# Patient Record
Sex: FEMALE | Race: BLACK OR AFRICAN AMERICAN | Employment: OTHER | URBAN - METROPOLITAN AREA
[De-identification: names, ages, dates, MRNs, and addresses within clinical notes are randomized per-mention and may not be internally consistent; named-entity substitution may affect disease eponyms.]

---

## 2017-03-02 ENCOUNTER — HOSPITAL ENCOUNTER (OUTPATIENT)
Dept: MAMMOGRAPHY | Age: 72
Discharge: HOME | End: 2017-03-02
Attending: FAMILY MEDICINE
Payer: MEDICARE

## 2017-03-02 DIAGNOSIS — Z12.31 VISIT FOR SCREENING MAMMOGRAM: ICD-10-CM

## 2017-03-02 PROCEDURE — 77067 SCR MAMMO BI INCL CAD: CPT

## 2017-07-13 PROBLEM — M51.36 DDD (DEGENERATIVE DISC DISEASE), LUMBAR: Status: ACTIVE | Noted: 2017-07-13

## 2017-07-24 ENCOUNTER — HOSPITAL ENCOUNTER (OUTPATIENT)
Dept: ULTRASOUND IMAGING | Age: 72
Discharge: HOME | End: 2017-07-24
Attending: FAMILY MEDICINE
Payer: MEDICARE

## 2017-07-24 DIAGNOSIS — N18.30 CHRONIC KIDNEY DISEASE, STAGE III (MODERATE) (HCC): ICD-10-CM

## 2017-07-24 PROCEDURE — 76775 US EXAM ABDO BACK WALL LIM: CPT

## 2018-03-20 ENCOUNTER — HOSPITAL ENCOUNTER (OUTPATIENT)
Dept: MAMMOGRAPHY | Age: 73
Discharge: HOME | End: 2018-03-20
Attending: FAMILY MEDICINE
Payer: MEDICARE

## 2018-03-20 DIAGNOSIS — Z12.39 SCREENING BREAST EXAMINATION: ICD-10-CM

## 2018-03-20 PROCEDURE — 77067 SCR MAMMO BI INCL CAD: CPT

## 2018-08-29 PROBLEM — E66.01 OBESITY, MORBID (HCC): Status: ACTIVE | Noted: 2018-08-29

## 2019-03-21 ENCOUNTER — HOSPITAL ENCOUNTER (OUTPATIENT)
Dept: MAMMOGRAPHY | Age: 74
Discharge: HOME | End: 2019-03-21
Attending: FAMILY MEDICINE
Payer: MEDICARE

## 2019-03-21 DIAGNOSIS — Z12.31 VISIT FOR SCREENING MAMMOGRAM: ICD-10-CM

## 2019-03-21 PROCEDURE — 77067 SCR MAMMO BI INCL CAD: CPT

## 2020-08-31 ENCOUNTER — HOSPITAL ENCOUNTER (OUTPATIENT)
Dept: MAMMOGRAPHY | Age: 75
Discharge: HOME | End: 2020-08-31
Attending: FAMILY MEDICINE
Payer: MEDICARE

## 2020-08-31 DIAGNOSIS — Z12.31 SCREENING MAMMOGRAM, ENCOUNTER FOR: ICD-10-CM

## 2020-08-31 DIAGNOSIS — M19.90 ARTHRITIS: ICD-10-CM

## 2020-08-31 DIAGNOSIS — M51.36 DDD (DEGENERATIVE DISC DISEASE), LUMBAR: ICD-10-CM

## 2020-08-31 PROCEDURE — 77067 SCR MAMMO BI INCL CAD: CPT

## 2020-08-31 PROCEDURE — 77080 DXA BONE DENSITY AXIAL: CPT

## 2021-04-28 PROBLEM — J30.1 SEASONAL ALLERGIC RHINITIS DUE TO POLLEN: Status: ACTIVE | Noted: 2021-04-28

## 2021-08-06 ENCOUNTER — TRANSCRIBE ORDER (OUTPATIENT)
Dept: SCHEDULING | Age: 76
End: 2021-08-06

## 2021-08-06 DIAGNOSIS — Z12.31 VISIT FOR SCREENING MAMMOGRAM: Primary | ICD-10-CM

## 2021-09-18 ENCOUNTER — HOSPITAL ENCOUNTER (OUTPATIENT)
Dept: MAMMOGRAPHY | Age: 76
Discharge: HOME | End: 2021-09-18
Attending: FAMILY MEDICINE
Payer: MEDICARE

## 2021-09-18 DIAGNOSIS — Z12.31 VISIT FOR SCREENING MAMMOGRAM: ICD-10-CM

## 2021-09-18 PROCEDURE — 77067 SCR MAMMO BI INCL CAD: CPT

## 2021-10-30 PROBLEM — H10.13 ALLERGIC CONJUNCTIVITIS OF BOTH EYES: Status: ACTIVE | Noted: 2021-10-30

## 2022-02-02 PROCEDURE — 88305 TISSUE EXAM BY PATHOLOGIST: CPT

## 2022-02-03 ENCOUNTER — HOSPITAL ENCOUNTER (OUTPATIENT)
Dept: LAB | Age: 77
Discharge: HOME | End: 2022-02-03

## 2022-03-14 ENCOUNTER — HOSPITAL ENCOUNTER (OUTPATIENT)
Dept: CT IMAGING | Age: 77
Discharge: HOME | End: 2022-03-14
Attending: NURSE PRACTITIONER
Payer: MEDICARE

## 2022-03-14 DIAGNOSIS — R10.9 SUDDEN ONSET OF SEVERE ABDOMINAL PAIN: ICD-10-CM

## 2022-03-14 DIAGNOSIS — N18.4 CHRONIC KIDNEY DISEASE, STAGE IV (SEVERE) (HCC): ICD-10-CM

## 2022-03-14 LAB — CREAT BLD-MCNC: 3.91 MG/DL (ref 0.8–1.5)

## 2022-03-14 PROCEDURE — 74011000636 HC RX REV CODE- 636: Performed by: NURSE PRACTITIONER

## 2022-03-14 PROCEDURE — 82565 ASSAY OF CREATININE: CPT

## 2022-03-14 PROCEDURE — 74176 CT ABD & PELVIS W/O CONTRAST: CPT

## 2022-03-14 RX ADMIN — DIATRIZOATE MEGLUMINE AND DIATRIZOATE SODIUM 15 ML: 660; 100 LIQUID ORAL; RECTAL at 13:41

## 2022-03-14 NOTE — PROGRESS NOTES
Per stat call report no evidence of abscess - there is diverticulitis - take antibiotic and f/up next week or as needed per other labs.  Also, she needs to f/up with nephrology asap

## 2022-03-18 PROBLEM — M51.36 DDD (DEGENERATIVE DISC DISEASE), LUMBAR: Status: ACTIVE | Noted: 2017-07-13

## 2022-03-18 PROBLEM — M51.369 DDD (DEGENERATIVE DISC DISEASE), LUMBAR: Status: ACTIVE | Noted: 2017-07-13

## 2022-03-19 PROBLEM — J30.1 ALLERGIC RHINITIS DUE TO POLLEN: Status: ACTIVE | Noted: 2021-04-28

## 2022-03-19 PROBLEM — H10.13 ALLERGIC CONJUNCTIVITIS OF BOTH EYES: Status: ACTIVE | Noted: 2021-10-30

## 2022-03-19 PROBLEM — E66.01 OBESITY, MORBID (HCC): Status: ACTIVE | Noted: 2018-08-29

## 2022-05-20 PROBLEM — N18.4 STAGE 4 CHRONIC KIDNEY DISEASE (HCC): Status: ACTIVE | Noted: 2022-04-05

## 2022-05-20 PROBLEM — R73.01 IFG (IMPAIRED FASTING GLUCOSE): Status: ACTIVE | Noted: 2022-05-20

## 2022-05-24 ENCOUNTER — TELEPHONE (OUTPATIENT)
Dept: INTERNAL MEDICINE CLINIC | Facility: CLINIC | Age: 77
End: 2022-05-24

## 2022-05-24 NOTE — TELEPHONE ENCOUNTER
-We discussed this, she didn't have a sinus infection, she had allergies and I refilled her Xyzal and she was doing flonase. She didn't have any purulent congestion at that time.

## 2022-06-13 DIAGNOSIS — N18.5 STAGE 5 CHRONIC KIDNEY DISEASE NOT ON CHRONIC DIALYSIS (HCC): Primary | ICD-10-CM

## 2022-09-20 ENCOUNTER — HOSPITAL ENCOUNTER (OUTPATIENT)
Dept: MAMMOGRAPHY | Age: 77
Discharge: HOME | End: 2022-09-23
Payer: MEDICARE

## 2022-09-20 DIAGNOSIS — Z12.31 VISIT FOR SCREENING MAMMOGRAM: ICD-10-CM

## 2022-09-20 PROCEDURE — 77067 SCR MAMMO BI INCL CAD: CPT

## 2022-10-13 RX ORDER — FUROSEMIDE 20 MG/1
TABLET ORAL
Qty: 90 TABLET | Refills: 1 | Status: SHIPPED | OUTPATIENT
Start: 2022-10-13

## 2022-11-18 ENCOUNTER — OFFICE VISIT (OUTPATIENT)
Dept: INTERNAL MEDICINE CLINIC | Facility: CLINIC | Age: 77
End: 2022-11-18
Payer: MEDICARE

## 2022-11-18 VITALS
SYSTOLIC BLOOD PRESSURE: 142 MMHG | HEART RATE: 90 BPM | RESPIRATION RATE: 16 BRPM | HEIGHT: 65 IN | DIASTOLIC BLOOD PRESSURE: 80 MMHG | WEIGHT: 269 LBS | OXYGEN SATURATION: 96 % | BODY MASS INDEX: 44.82 KG/M2

## 2022-11-18 DIAGNOSIS — N18.4 STAGE 4 CHRONIC KIDNEY DISEASE (HCC): Primary | ICD-10-CM

## 2022-11-18 DIAGNOSIS — M17.0 ARTHRITIS OF BOTH KNEES: ICD-10-CM

## 2022-11-18 DIAGNOSIS — I12.9 BENIGN HYPERTENSION WITH CHRONIC KIDNEY DISEASE: ICD-10-CM

## 2022-11-18 DIAGNOSIS — K58.0 IRRITABLE BOWEL SYNDROME WITH DIARRHEA: ICD-10-CM

## 2022-11-18 PROCEDURE — 4004F PT TOBACCO SCREEN RCVD TLK: CPT | Performed by: FAMILY MEDICINE

## 2022-11-18 PROCEDURE — 1123F ACP DISCUSS/DSCN MKR DOCD: CPT | Performed by: FAMILY MEDICINE

## 2022-11-18 PROCEDURE — 1090F PRES/ABSN URINE INCON ASSESS: CPT | Performed by: FAMILY MEDICINE

## 2022-11-18 PROCEDURE — 99214 OFFICE O/P EST MOD 30 MIN: CPT | Performed by: FAMILY MEDICINE

## 2022-11-18 PROCEDURE — G8399 PT W/DXA RESULTS DOCUMENT: HCPCS | Performed by: FAMILY MEDICINE

## 2022-11-18 PROCEDURE — G8417 CALC BMI ABV UP PARAM F/U: HCPCS | Performed by: FAMILY MEDICINE

## 2022-11-18 PROCEDURE — 3017F COLORECTAL CA SCREEN DOC REV: CPT | Performed by: FAMILY MEDICINE

## 2022-11-18 PROCEDURE — G8428 CUR MEDS NOT DOCUMENT: HCPCS | Performed by: FAMILY MEDICINE

## 2022-11-18 PROCEDURE — G8484 FLU IMMUNIZE NO ADMIN: HCPCS | Performed by: FAMILY MEDICINE

## 2022-11-18 RX ORDER — LEVOCETIRIZINE DIHYDROCHLORIDE 5 MG/1
5 TABLET, FILM COATED ORAL DAILY
Qty: 90 TABLET | Refills: 1 | Status: SHIPPED | OUTPATIENT
Start: 2022-11-18

## 2022-11-18 RX ORDER — AMLODIPINE BESYLATE 10 MG/1
10 TABLET ORAL DAILY
Qty: 90 TABLET | Refills: 1 | Status: SHIPPED | OUTPATIENT
Start: 2022-11-18

## 2022-11-18 RX ORDER — MONTELUKAST SODIUM 4 MG/1
1 TABLET, CHEWABLE ORAL 2 TIMES DAILY
Qty: 180 TABLET | Refills: 1 | Status: SHIPPED | OUTPATIENT
Start: 2022-11-18

## 2022-11-18 RX ORDER — LISINOPRIL 40 MG/1
40 TABLET ORAL DAILY
Qty: 90 TABLET | Refills: 1 | Status: SHIPPED | OUTPATIENT
Start: 2022-11-18

## 2022-11-18 RX ORDER — LISINOPRIL 40 MG/1
40 TABLET ORAL DAILY
COMMUNITY
End: 2022-11-18 | Stop reason: SDUPTHER

## 2022-11-18 RX ORDER — SODIUM BICARBONATE 650 MG/1
650 TABLET ORAL 2 TIMES DAILY
Qty: 180 TABLET | Refills: 1 | Status: SHIPPED | OUTPATIENT
Start: 2022-11-18

## 2022-11-18 ASSESSMENT — PATIENT HEALTH QUESTIONNAIRE - PHQ9
SUM OF ALL RESPONSES TO PHQ QUESTIONS 1-9: 0
2. FEELING DOWN, DEPRESSED OR HOPELESS: 0
SUM OF ALL RESPONSES TO PHQ9 QUESTIONS 1 & 2: 0
1. LITTLE INTEREST OR PLEASURE IN DOING THINGS: 0

## 2022-11-18 NOTE — PROGRESS NOTES
Afsaneh Myers DO  Diplomate of the [employer] of [address] [place] [address] Internal Medicine      Jignesh Chawla (: 1945) is a 78 y.o. female, here for evaluation of the following chief complaint(s):  Hypertension (/)       ASSESSMENT/PLAN:  1. Stage 4 chronic kidney disease ([address]  -     sodium bicarbonate 650 MG tablet; Take 1 tablet by mouth 2 times daily, Disp-180 tablet, R-1Normal  2. Benign hypertension with chronic kidney disease  -     amLODIPine (NORVASC) 10 MG tablet; Take 1 tablet by mouth daily, Disp-90 tablet, R-1Normal  -     lisinopril (PRINIVIL;ZESTRIL) 40 MG tablet; Take 1 tablet by mouth daily, Disp-90 tablet, R-1Normal  3. Irritable bowel syndrome with diarrhea  -     colestipol (COLESTID) 1 g tablet; Take 1 tablet by mouth 2 times daily, Disp-180 tablet, R-1Normal  4. Arthritis of both knees     -CKD managed by Nephrology. Stressed importance of good hydration. Continue with sodium bicarb.  -Tolerating medication well for HTN. Achieving desired therapeutic response with   Key Anti-Hypertensive Meds            amLODIPine (NORVASC) 10 MG tablet (Taking)    Sig - Route: Take 1 tablet by mouth daily - Oral    lisinopril (PRINIVIL;ZESTRIL) 40 MG tablet (Taking)    Sig - Route: Take 1 tablet by mouth daily - Oral    furosemide (LASIX) 20 MG tablet (Taking)    Sig: TAKE 1 TABLET BY MOUTH DAILY         --will continue. Will periodically review and adjust if needed. Encourage home monitoring. BP initially elevated, but on recheck looks ok. -Will continue with colestid for chronic diarrhea.  -Suggested cane as pt gait very unsteady due to knees. Follow up with Ortho as scheduled. SUBJECTIVE/OBJECTIVE:  HPI:  -CKD: Has had good urine output. Urine not overly concentrated. No dysuria/hematuria. Trying to maintain good fluid intake. -HTN: Home BP Monitoring: no. taking medications as instructed, no medication side effects noted.   She denies chest pain, palpitations, exertional pain or pressure. -IBS symptoms have been controlled with colestid.  -Having worsening pain in both of her knees--looking into R knee replacement as pain significantly worse. Takes tylenol with minimal relief. ROS negative except as noted above today. Social History     Tobacco Use    Smoking status: Every Day     Packs/day: 0.25     Types: Cigarettes     Last attempt to quit: 6/3/2016     Years since quittin.4    Smokeless tobacco: Never   Substance Use Topics    Alcohol use: Yes     Alcohol/week: 0.0 standard drinks    Drug use: No     Vitals:    22 1536 22 1559   BP: (!) 156/78 (!) 142/80   Site: Left Upper Arm    Position: Sitting    Pulse: 90    Resp: 16    SpO2: 96%    Weight: 269 lb (122 kg)    Height: 5' 5\" (1.651 m)       Body mass index is 44.76 kg/m². Physical Exam  Vitals reviewed. Cardiovascular:      Rate and Rhythm: Normal rate and regular rhythm. Pulmonary:      Effort: Pulmonary effort is normal.      Breath sounds: Normal breath sounds. Musculoskeletal:      Comments: -+antalgic gait due to knee pain. Skin:     General: Skin is warm and dry. Neurological:      Mental Status: She is alert. An electronic signature was used to authenticate this note.   Jackelin Langley DO

## 2023-01-17 ENCOUNTER — OFFICE VISIT (OUTPATIENT)
Dept: INTERNAL MEDICINE CLINIC | Facility: CLINIC | Age: 78
End: 2023-01-17
Payer: MEDICARE

## 2023-01-17 VITALS
SYSTOLIC BLOOD PRESSURE: 130 MMHG | BODY MASS INDEX: 43.65 KG/M2 | OXYGEN SATURATION: 97 % | HEART RATE: 83 BPM | DIASTOLIC BLOOD PRESSURE: 78 MMHG | HEIGHT: 65 IN | WEIGHT: 262 LBS

## 2023-01-17 DIAGNOSIS — J01.20 ACUTE ETHMOIDAL SINUSITIS, RECURRENCE NOT SPECIFIED: Primary | ICD-10-CM

## 2023-01-17 DIAGNOSIS — N18.5 CHRONIC KIDNEY DISEASE, STAGE 5 (HCC): ICD-10-CM

## 2023-01-17 DIAGNOSIS — N18.5 CHRONIC KIDNEY DISEASE (CKD), STAGE V (HCC): ICD-10-CM

## 2023-01-17 PROCEDURE — 99213 OFFICE O/P EST LOW 20 MIN: CPT | Performed by: NURSE PRACTITIONER

## 2023-01-17 PROCEDURE — G8484 FLU IMMUNIZE NO ADMIN: HCPCS | Performed by: NURSE PRACTITIONER

## 2023-01-17 PROCEDURE — G8417 CALC BMI ABV UP PARAM F/U: HCPCS | Performed by: NURSE PRACTITIONER

## 2023-01-17 PROCEDURE — 4004F PT TOBACCO SCREEN RCVD TLK: CPT | Performed by: NURSE PRACTITIONER

## 2023-01-17 PROCEDURE — G8427 DOCREV CUR MEDS BY ELIG CLIN: HCPCS | Performed by: NURSE PRACTITIONER

## 2023-01-17 PROCEDURE — G8399 PT W/DXA RESULTS DOCUMENT: HCPCS | Performed by: NURSE PRACTITIONER

## 2023-01-17 PROCEDURE — 1123F ACP DISCUSS/DSCN MKR DOCD: CPT | Performed by: NURSE PRACTITIONER

## 2023-01-17 PROCEDURE — 3017F COLORECTAL CA SCREEN DOC REV: CPT | Performed by: NURSE PRACTITIONER

## 2023-01-17 PROCEDURE — 1090F PRES/ABSN URINE INCON ASSESS: CPT | Performed by: NURSE PRACTITIONER

## 2023-01-17 RX ORDER — AMOXICILLIN AND CLAVULANATE POTASSIUM 500; 125 MG/1; MG/1
1 TABLET, FILM COATED ORAL 2 TIMES DAILY
Qty: 14 TABLET | Refills: 0 | Status: SHIPPED | OUTPATIENT
Start: 2023-01-17 | End: 2023-01-24

## 2023-01-17 SDOH — ECONOMIC STABILITY: FOOD INSECURITY: WITHIN THE PAST 12 MONTHS, THE FOOD YOU BOUGHT JUST DIDN'T LAST AND YOU DIDN'T HAVE MONEY TO GET MORE.: NEVER TRUE

## 2023-01-17 SDOH — ECONOMIC STABILITY: FOOD INSECURITY: WITHIN THE PAST 12 MONTHS, YOU WORRIED THAT YOUR FOOD WOULD RUN OUT BEFORE YOU GOT MONEY TO BUY MORE.: NEVER TRUE

## 2023-01-17 ASSESSMENT — ENCOUNTER SYMPTOMS
SORE THROAT: 1
SINUS PRESSURE: 1
SHORTNESS OF BREATH: 0
COUGH: 1

## 2023-01-17 ASSESSMENT — PATIENT HEALTH QUESTIONNAIRE - PHQ9
SUM OF ALL RESPONSES TO PHQ QUESTIONS 1-9: 0
2. FEELING DOWN, DEPRESSED OR HOPELESS: 0
SUM OF ALL RESPONSES TO PHQ QUESTIONS 1-9: 0
SUM OF ALL RESPONSES TO PHQ9 QUESTIONS 1 & 2: 0
1. LITTLE INTEREST OR PLEASURE IN DOING THINGS: 0
SUM OF ALL RESPONSES TO PHQ QUESTIONS 1-9: 0
SUM OF ALL RESPONSES TO PHQ QUESTIONS 1-9: 0

## 2023-01-17 ASSESSMENT — SOCIAL DETERMINANTS OF HEALTH (SDOH): HOW HARD IS IT FOR YOU TO PAY FOR THE VERY BASICS LIKE FOOD, HOUSING, MEDICAL CARE, AND HEATING?: NOT VERY HARD

## 2023-01-17 NOTE — PROGRESS NOTES
Lord Rocha (:  1945) is a 78 y.o. female,Established patient, here for evaluation of the following chief complaint(s):  Sinusitis, Cough (Negative home covid test/), Pharyngitis, and Otalgia         ASSESSMENT/PLAN:  1. Acute ethmoidal sinusitis, recurrence not specified  2. Chronic kidney disease, stage 5 ([facility]  3. Chronic kidney disease (CKD), stage V ([facility] [facility] [[id number]]    No follow-ups on file. Sinusitis  Start abx, renally dosed based on last GFR documented  Discussed med risks and side effects  Continue flonase     Subjective   SUBJECTIVE/OBJECTIVE:  Patient is here for sinus congestion and sore throat. She has a lot of post-nasal drip. It started Tuesday of last week. She has been on daryl-seltzer plus. She is using vicks for her throat. She has been \"coughing her head off\". Sinusitis  This is a new problem. The current episode started in the past 7 days. There has been no fever. Associated symptoms include chills, congestion, coughing, ear pain, headaches, sinus pressure and a sore throat. Pertinent negatives include no shortness of breath. Cough  This is a new problem. The current episode started in the past 7 days. Associated symptoms include chills, ear pain, headaches and a sore throat. Pertinent negatives include no shortness of breath. Pharyngitis  Associated symptoms include chills, congestion, coughing, headaches and a sore throat. Otalgia   Associated symptoms include coughing, headaches and a sore throat. Review of Systems   Constitutional:  Positive for chills. HENT:  Positive for congestion, ear pain, sinus pressure and sore throat. Respiratory:  Positive for cough. Negative for shortness of breath. Neurological:  Positive for headaches. Objective   Physical Exam  Vitals reviewed. Constitutional:       Appearance: Normal appearance. She is ill-appearing. HENT:      Head: Normocephalic. Nose: Congestion present.       Mouth/Throat:      Mouth: Mucous membranes are moist.   Eyes:      Pupils: Pupils are equal, round, and reactive to light. Cardiovascular:      Rate and Rhythm: Normal rate and regular rhythm. Pulmonary:      Effort: Pulmonary effort is normal.      Breath sounds: Normal breath sounds. No wheezing. Musculoskeletal:      Cervical back: Neck supple. Neurological:      General: No focal deficit present. Mental Status: She is alert and oriented to person, place, and time. Psychiatric:         Mood and Affect: Mood normal.                An electronic signature was used to authenticate this note.     --RITU Cage - CNP

## 2023-05-19 ENCOUNTER — OFFICE VISIT (OUTPATIENT)
Dept: INTERNAL MEDICINE CLINIC | Facility: CLINIC | Age: 78
End: 2023-05-19

## 2023-05-19 VITALS
BODY MASS INDEX: 44.15 KG/M2 | WEIGHT: 265 LBS | RESPIRATION RATE: 16 BRPM | OXYGEN SATURATION: 98 % | HEIGHT: 65 IN | DIASTOLIC BLOOD PRESSURE: 70 MMHG | SYSTOLIC BLOOD PRESSURE: 130 MMHG | HEART RATE: 88 BPM

## 2023-05-19 DIAGNOSIS — E66.01 OBESITY, CLASS III, BMI 40-49.9 (MORBID OBESITY) (HCC): ICD-10-CM

## 2023-05-19 DIAGNOSIS — I89.0 CHRONIC ACQUIRED LYMPHEDEMA: Primary | ICD-10-CM

## 2023-05-19 DIAGNOSIS — Z00.00 MEDICARE ANNUAL WELLNESS VISIT, SUBSEQUENT: ICD-10-CM

## 2023-05-19 DIAGNOSIS — N18.4 STAGE 4 CHRONIC KIDNEY DISEASE (HCC): ICD-10-CM

## 2023-05-19 DIAGNOSIS — I12.9 BENIGN HYPERTENSION WITH CHRONIC KIDNEY DISEASE: ICD-10-CM

## 2023-05-19 DIAGNOSIS — K58.0 IRRITABLE BOWEL SYNDROME WITH DIARRHEA: ICD-10-CM

## 2023-05-19 RX ORDER — LISINOPRIL 40 MG/1
40 TABLET ORAL DAILY
Qty: 90 TABLET | Refills: 1 | Status: SHIPPED | OUTPATIENT
Start: 2023-05-19

## 2023-05-19 RX ORDER — AMLODIPINE BESYLATE 10 MG/1
10 TABLET ORAL DAILY
Qty: 90 TABLET | Refills: 1 | Status: SHIPPED | OUTPATIENT
Start: 2023-05-19

## 2023-05-19 RX ORDER — FUROSEMIDE 20 MG/1
20 TABLET ORAL DAILY
Qty: 90 TABLET | Refills: 1 | Status: CANCELLED | OUTPATIENT
Start: 2023-05-19

## 2023-05-19 RX ORDER — MONTELUKAST SODIUM 4 MG/1
1 TABLET, CHEWABLE ORAL 2 TIMES DAILY
Qty: 180 TABLET | Refills: 1 | Status: SHIPPED | OUTPATIENT
Start: 2023-05-19

## 2023-05-19 RX ORDER — LEVOCETIRIZINE DIHYDROCHLORIDE 5 MG/1
5 TABLET, FILM COATED ORAL DAILY
Qty: 90 TABLET | Refills: 1 | Status: SHIPPED | OUTPATIENT
Start: 2023-05-19

## 2023-05-19 RX ORDER — SODIUM BICARBONATE 650 MG/1
650 TABLET ORAL 2 TIMES DAILY
Qty: 180 TABLET | Refills: 1 | Status: SHIPPED | OUTPATIENT
Start: 2023-05-19

## 2023-05-19 SDOH — ECONOMIC STABILITY: FOOD INSECURITY: WITHIN THE PAST 12 MONTHS, THE FOOD YOU BOUGHT JUST DIDN'T LAST AND YOU DIDN'T HAVE MONEY TO GET MORE.: NEVER TRUE

## 2023-05-19 SDOH — ECONOMIC STABILITY: HOUSING INSECURITY
IN THE LAST 12 MONTHS, WAS THERE A TIME WHEN YOU DID NOT HAVE A STEADY PLACE TO SLEEP OR SLEPT IN A SHELTER (INCLUDING NOW)?: NO

## 2023-05-19 SDOH — ECONOMIC STABILITY: FOOD INSECURITY: WITHIN THE PAST 12 MONTHS, YOU WORRIED THAT YOUR FOOD WOULD RUN OUT BEFORE YOU GOT MONEY TO BUY MORE.: NEVER TRUE

## 2023-05-19 SDOH — ECONOMIC STABILITY: INCOME INSECURITY: HOW HARD IS IT FOR YOU TO PAY FOR THE VERY BASICS LIKE FOOD, HOUSING, MEDICAL CARE, AND HEATING?: NOT HARD AT ALL

## 2023-05-19 ASSESSMENT — LIFESTYLE VARIABLES
HOW OFTEN DO YOU HAVE A DRINK CONTAINING ALCOHOL: NEVER
HOW MANY STANDARD DRINKS CONTAINING ALCOHOL DO YOU HAVE ON A TYPICAL DAY: PATIENT DOES NOT DRINK

## 2023-05-19 ASSESSMENT — PATIENT HEALTH QUESTIONNAIRE - PHQ9
SUM OF ALL RESPONSES TO PHQ9 QUESTIONS 1 & 2: 0
SUM OF ALL RESPONSES TO PHQ QUESTIONS 1-9: 0
2. FEELING DOWN, DEPRESSED OR HOPELESS: 0
SUM OF ALL RESPONSES TO PHQ QUESTIONS 1-9: 0
1. LITTLE INTEREST OR PLEASURE IN DOING THINGS: 0

## 2023-05-19 NOTE — PROGRESS NOTES
Medicare Annual Wellness Visit    Shaji Raphael is here for Medicare AWV and Hypertension    Assessment & Plan   Chronic acquired lymphedema  -     [facility]  Benign hypertension with chronic kidney disease  -     amLODIPine (NORVASC) 10 MG tablet; Take 1 tablet by mouth daily, Disp-90 tablet, R-1Normal  -     lisinopril (PRINIVIL;ZESTRIL) 40 MG tablet; Take 1 tablet by mouth daily, Disp-90 tablet, R-1Normal  Irritable bowel syndrome with diarrhea  -     colestipol (COLESTID) 1 g tablet; Take 1 tablet by mouth 2 times daily, Disp-180 tablet, R-1Normal  Stage 4 chronic kidney disease (HCC)  -     sodium bicarbonate 650 MG tablet; Take 1 tablet by mouth 2 times daily, Disp-180 tablet, R-1Normal  Medicare annual wellness visit, subsequent  Obesity, Class III, BMI 40-49.9 (morbid obesity) ([facility] [address]        Referral to lymphedema clinic, keep legs elevated as much as possible. Tolerating medication well for HTN. Achieving desired therapeutic response with   Key Anti-Hypertensive Meds            amLODIPine (NORVASC) 10 MG tablet (Taking)    Sig - Route: Take 1 tablet by mouth daily - Oral    lisinopril (PRINIVIL;ZESTRIL) 40 MG tablet (Taking)    Sig - Route: Take 1 tablet by mouth daily - Oral    furosemide (LASIX) 20 MG tablet (Taking)    Sig: TAKE 1 TABLET BY MOUTH DAILY    Cosign for Ordering: Accepted by Carolee Fabry, DO on 4/11/2023  7:25 AM         --will continue. Will periodically review and adjust if needed. Encourage home monitoring. Continue with colestid as tolerating well and providing good clinical benefit. Encouraged continued follow-up with nephrology. She will continue with sodium bicarbonate twice daily as well.         Recommendations for Preventive Services Due: see orders and patient instructions/AVS.  Recommended screening schedule for the next 5-10 years is provided to the patient in written form: see Patient Instructions/AVS.     No follow-ups

## 2023-05-19 NOTE — PATIENT INSTRUCTIONS
SPF of 30 or greater. Reapply every 2 to 3 hours or after sweating, drying off with a towel, or swimming. Always wear a seat belt when traveling in a car. Always wear a helmet when riding a bicycle or motorcycle.

## 2023-05-26 DIAGNOSIS — I12.9 BENIGN HYPERTENSION WITH CHRONIC KIDNEY DISEASE: ICD-10-CM

## 2023-05-26 RX ORDER — LISINOPRIL 40 MG/1
40 TABLET ORAL DAILY
Qty: 90 TABLET | Refills: 1 | OUTPATIENT
Start: 2023-05-26

## 2023-08-30 ENCOUNTER — TRANSCRIBE ORDERS (OUTPATIENT)
Dept: SCHEDULING | Age: 78
End: 2023-08-30

## 2023-08-30 DIAGNOSIS — Z12.31 SCREENING MAMMOGRAM FOR HIGH-RISK PATIENT: Primary | ICD-10-CM

## 2023-09-21 ENCOUNTER — HOSPITAL ENCOUNTER (OUTPATIENT)
Dept: MAMMOGRAPHY | Age: 78
Discharge: HOME | End: 2023-09-21
Attending: FAMILY MEDICINE
Payer: MEDICARE

## 2023-09-21 VITALS — WEIGHT: 256 LBS | BODY MASS INDEX: 42.6 KG/M2

## 2023-09-21 DIAGNOSIS — Z12.31 SCREENING MAMMOGRAM FOR HIGH-RISK PATIENT: ICD-10-CM

## 2023-09-21 PROCEDURE — 77067 SCR MAMMO BI INCL CAD: CPT

## 2023-10-10 RX ORDER — FUROSEMIDE 20 MG/1
TABLET ORAL
Qty: 90 TABLET | Refills: 1 | Status: SHIPPED | OUTPATIENT
Start: 2023-10-10

## 2023-11-20 ENCOUNTER — OFFICE VISIT (OUTPATIENT)
Dept: INTERNAL MEDICINE CLINIC | Facility: CLINIC | Age: 78
End: 2023-11-20
Payer: MEDICARE

## 2023-11-20 VITALS
RESPIRATION RATE: 16 BRPM | OXYGEN SATURATION: 98 % | WEIGHT: 253 LBS | SYSTOLIC BLOOD PRESSURE: 138 MMHG | HEIGHT: 65 IN | BODY MASS INDEX: 42.15 KG/M2 | DIASTOLIC BLOOD PRESSURE: 76 MMHG | HEART RATE: 88 BPM

## 2023-11-20 DIAGNOSIS — I12.9 BENIGN HYPERTENSION WITH CHRONIC KIDNEY DISEASE: Primary | ICD-10-CM

## 2023-11-20 DIAGNOSIS — N18.6 ESRD (END STAGE RENAL DISEASE) (HCC): ICD-10-CM

## 2023-11-20 DIAGNOSIS — K58.0 IRRITABLE BOWEL SYNDROME WITH DIARRHEA: ICD-10-CM

## 2023-11-20 PROCEDURE — G8417 CALC BMI ABV UP PARAM F/U: HCPCS | Performed by: FAMILY MEDICINE

## 2023-11-20 PROCEDURE — 99214 OFFICE O/P EST MOD 30 MIN: CPT | Performed by: FAMILY MEDICINE

## 2023-11-20 PROCEDURE — 3017F COLORECTAL CA SCREEN DOC REV: CPT | Performed by: FAMILY MEDICINE

## 2023-11-20 PROCEDURE — 1090F PRES/ABSN URINE INCON ASSESS: CPT | Performed by: FAMILY MEDICINE

## 2023-11-20 PROCEDURE — 1123F ACP DISCUSS/DSCN MKR DOCD: CPT | Performed by: FAMILY MEDICINE

## 2023-11-20 PROCEDURE — 4004F PT TOBACCO SCREEN RCVD TLK: CPT | Performed by: FAMILY MEDICINE

## 2023-11-20 PROCEDURE — G8484 FLU IMMUNIZE NO ADMIN: HCPCS | Performed by: FAMILY MEDICINE

## 2023-11-20 PROCEDURE — G8399 PT W/DXA RESULTS DOCUMENT: HCPCS | Performed by: FAMILY MEDICINE

## 2023-11-20 PROCEDURE — G8427 DOCREV CUR MEDS BY ELIG CLIN: HCPCS | Performed by: FAMILY MEDICINE

## 2023-11-20 RX ORDER — AMLODIPINE BESYLATE 10 MG/1
10 TABLET ORAL DAILY
Qty: 90 TABLET | Refills: 1 | Status: SHIPPED | OUTPATIENT
Start: 2023-11-20

## 2023-11-20 RX ORDER — MONTELUKAST SODIUM 4 MG/1
1 TABLET, CHEWABLE ORAL 2 TIMES DAILY
Qty: 180 TABLET | Refills: 1 | Status: SHIPPED | OUTPATIENT
Start: 2023-11-20

## 2023-11-20 RX ORDER — LEVOCETIRIZINE DIHYDROCHLORIDE 5 MG/1
5 TABLET, FILM COATED ORAL DAILY
Qty: 90 TABLET | Refills: 1 | Status: SHIPPED | OUTPATIENT
Start: 2023-11-20

## 2023-11-20 RX ORDER — LISINOPRIL 40 MG/1
40 TABLET ORAL DAILY
Qty: 90 TABLET | Refills: 1 | Status: SHIPPED | OUTPATIENT
Start: 2023-11-20

## 2023-11-20 RX ORDER — SODIUM BICARBONATE 650 MG/1
650 TABLET ORAL 2 TIMES DAILY
Qty: 180 TABLET | Refills: 1 | Status: SHIPPED | OUTPATIENT
Start: 2023-11-20

## 2023-11-20 NOTE — PROGRESS NOTES
Kaye Renae DO  Diplomate of the [employer] of [address] [place] [address] (: 1945) is a 78 y.o. female, here for evaluation of the following chief complaint(s):  Hypertension       ASSESSMENT/PLAN:  1. Benign hypertension with chronic kidney disease  -     amLODIPine (NORVASC) 10 MG tablet; Take 1 tablet by mouth daily, Disp-90 tablet, R-1Normal  -     lisinopril (PRINIVIL;ZESTRIL) 40 MG tablet; Take 1 tablet by mouth daily, Disp-90 tablet, R-1Normal  2. Irritable bowel syndrome with diarrhea  -     colestipol (COLESTID) 1 g tablet; Take 1 tablet by mouth 2 times daily, Disp-180 tablet, R-1Normal  3. ESRD (end stage renal disease) ([facility])  -     sodium bicarbonate 650 MG tablet; Take 1 tablet by mouth 2 times daily, Disp-180 tablet, R-1Normal     Tolerating medication well for HTN. Achieving desired therapeutic response with   Key Anti-Hypertensive Meds            amLODIPine (NORVASC) 10 MG tablet (Taking)    Sig - Route: Take 1 tablet by mouth daily - Oral    lisinopril (PRINIVIL;ZESTRIL) 40 MG tablet (Taking)    Sig - Route: Take 1 tablet by mouth daily - Oral    furosemide (LASIX) 20 MG tablet (Taking)    Sig: TAKE 1 TABLET BY MOUTH DAILY         --will continue. Will periodically review and adjust if needed. Encourage home monitoring. We will continue with Colestid for the time being as far as IBS is concerned. She has only been taking once daily as advised to do this twice daily. We will continue with sodium bicarbonate for end-stage renal disease. She is following up with nephrology. Last GFR was at 10. Suggested magnesium 400mg OTC to try for cramping. SUBJECTIVE/OBJECTIVE:  HPI:  HTN: Home BP Monitoring: no. taking medications as instructed, no medication side effects noted. She denies chest pain, palpitations, exertional pain or pressure. She has end-stage renal disease. She is followed by nephrology.   Holding off on dialysis

## 2024-05-03 ENCOUNTER — HOSPITAL ENCOUNTER (EMERGENCY)
Age: 79
Discharge: HOME | End: 2024-05-03
Attending: EMERGENCY MEDICINE
Payer: MEDICARE

## 2024-05-03 ENCOUNTER — APPOINTMENT (OUTPATIENT)
Dept: CT IMAGING | Age: 79
End: 2024-05-03
Payer: MEDICARE

## 2024-05-03 VITALS
TEMPERATURE: 98.2 F | BODY MASS INDEX: 40.82 KG/M2 | DIASTOLIC BLOOD PRESSURE: 96 MMHG | RESPIRATION RATE: 18 BRPM | HEIGHT: 65 IN | WEIGHT: 245 LBS | OXYGEN SATURATION: 98 % | HEART RATE: 86 BPM | SYSTOLIC BLOOD PRESSURE: 150 MMHG

## 2024-05-03 DIAGNOSIS — K43.9 VENTRAL HERNIA WITHOUT OBSTRUCTION OR GANGRENE: Primary | ICD-10-CM

## 2024-05-03 DIAGNOSIS — N30.00 ACUTE CYSTITIS WITHOUT HEMATURIA: ICD-10-CM

## 2024-05-03 LAB
ALBUMIN SERPL-MCNC: 4.2 G/DL (ref 3.2–4.6)
ALBUMIN/GLOB SERPL: 0.8 (ref 1–1.9)
ALP SERPL-CCNC: 99 U/L (ref 35–104)
ALT SERPL-CCNC: 20 U/L (ref 12–65)
ANION GAP SERPL CALC-SCNC: 18 MMOL/L (ref 9–18)
AST SERPL-CCNC: 25 U/L (ref 15–37)
BACTERIA URNS QL MICRO: ABNORMAL /HPF
BASOPHILS # BLD: 0 K/UL (ref 0–0.2)
BASOPHILS NFR BLD: 0 % (ref 0–2)
BILIRUB SERPL-MCNC: 0.5 MG/DL (ref 0–1.2)
BILIRUB UR QL: NEGATIVE
BUN SERPL-MCNC: 36 MG/DL (ref 8–23)
CALCIUM SERPL-MCNC: 9.9 MG/DL (ref 8.8–10.2)
CASTS URNS QL MICRO: ABNORMAL /LPF
CHLORIDE SERPL-SCNC: 106 MMOL/L (ref 98–107)
CO2 SERPL-SCNC: 17 MMOL/L (ref 20–28)
CREAT SERPL-MCNC: 4.76 MG/DL (ref 0.6–1.1)
CRYSTALS URNS QL MICRO: 0 /LPF
DIFFERENTIAL METHOD BLD: ABNORMAL
EOSINOPHIL # BLD: 0.2 K/UL (ref 0–0.8)
EOSINOPHIL NFR BLD: 2 % (ref 0.5–7.8)
EPI CELLS #/AREA URNS HPF: ABNORMAL /HPF
ERYTHROCYTE [DISTWIDTH] IN BLOOD BY AUTOMATED COUNT: 14.3 % (ref 11.9–14.6)
GLOBULIN SER CALC-MCNC: 5.4 G/DL (ref 2.3–3.5)
GLUCOSE SERPL-MCNC: 113 MG/DL (ref 70–99)
GLUCOSE UR QL STRIP.AUTO: NEGATIVE MG/DL
HCT VFR BLD AUTO: 38.7 % (ref 35.8–46.3)
HGB BLD-MCNC: 11.7 G/DL (ref 11.7–15.4)
IMM GRANULOCYTES # BLD AUTO: 0.1 K/UL (ref 0–0.5)
IMM GRANULOCYTES NFR BLD AUTO: 0 % (ref 0–5)
KETONES UR-MCNC: NEGATIVE MG/DL
LEUKOCYTE ESTERASE UR QL STRIP: NEGATIVE
LIPASE SERPL-CCNC: 25 U/L (ref 13–60)
LYMPHOCYTES # BLD: 2.3 K/UL (ref 0.5–4.6)
LYMPHOCYTES NFR BLD: 20 % (ref 13–44)
MAGNESIUM SERPL-MCNC: 2.1 MG/DL (ref 1.8–2.4)
MCH RBC QN AUTO: 27.3 PG (ref 26.1–32.9)
MCHC RBC AUTO-ENTMCNC: 30.2 G/DL (ref 31.4–35)
MCV RBC AUTO: 90.4 FL (ref 82–102)
MONOCYTES # BLD: 0.6 K/UL (ref 0.1–1.3)
MONOCYTES NFR BLD: 5 % (ref 4–12)
MUCOUS THREADS URNS QL MICRO: 0 /LPF
NEUTS SEG # BLD: 8.3 K/UL (ref 1.7–8.2)
NEUTS SEG NFR BLD: 73 % (ref 43–78)
NITRITE UR QL: POSITIVE
NRBC # BLD: 0 K/UL (ref 0–0.2)
OTHER OBSERVATIONS: ABNORMAL
PH UR: 6 (ref 5–9)
PLATELET # BLD AUTO: 272 K/UL (ref 150–450)
PMV BLD AUTO: 10.7 FL (ref 9.4–12.3)
POTASSIUM SERPL-SCNC: 4.8 MMOL/L (ref 3.5–5.1)
PROT SERPL-MCNC: 9.6 G/DL (ref 6.3–8.2)
PROT UR QL: >300 MG/DL
RBC # BLD AUTO: 4.28 M/UL (ref 4.05–5.2)
RBC # UR STRIP: ABNORMAL
RBC #/AREA URNS HPF: ABNORMAL /HPF
SERVICE CMNT-IMP: ABNORMAL
SODIUM SERPL-SCNC: 141 MMOL/L (ref 136–145)
SP GR UR: 1.02 (ref 1–1.02)
UROBILINOGEN UR QL: 1 EU/DL (ref 0.2–1)
WBC # BLD AUTO: 11.4 K/UL (ref 4.3–11.1)
WBC URNS QL MICRO: ABNORMAL /HPF
YEAST URNS QL MICRO: ABNORMAL

## 2024-05-03 PROCEDURE — 6360000004 HC RX CONTRAST MEDICATION: Performed by: EMERGENCY MEDICINE

## 2024-05-03 PROCEDURE — 81015 MICROSCOPIC EXAM OF URINE: CPT

## 2024-05-03 PROCEDURE — 87086 URINE CULTURE/COLONY COUNT: CPT

## 2024-05-03 PROCEDURE — 85025 COMPLETE CBC W/AUTO DIFF WBC: CPT

## 2024-05-03 PROCEDURE — 81003 URINALYSIS AUTO W/O SCOPE: CPT

## 2024-05-03 PROCEDURE — 99285 EMERGENCY DEPT VISIT HI MDM: CPT

## 2024-05-03 PROCEDURE — 83690 ASSAY OF LIPASE: CPT

## 2024-05-03 PROCEDURE — 80053 COMPREHEN METABOLIC PANEL: CPT

## 2024-05-03 PROCEDURE — 83735 ASSAY OF MAGNESIUM: CPT

## 2024-05-03 PROCEDURE — 74176 CT ABD & PELVIS W/O CONTRAST: CPT

## 2024-05-03 RX ORDER — CEPHALEXIN 500 MG/1
500 CAPSULE ORAL 2 TIMES DAILY
Qty: 14 CAPSULE | Refills: 0 | Status: SHIPPED | OUTPATIENT
Start: 2024-05-03 | End: 2024-05-10

## 2024-05-03 RX ORDER — FLUCONAZOLE 150 MG/1
150 TABLET ORAL
Qty: 2 TABLET | Refills: 0 | Status: SHIPPED | OUTPATIENT
Start: 2024-05-03 | End: 2024-05-09

## 2024-05-03 RX ADMIN — DIATRIZOATE MEGLUMINE AND DIATRIZOATE SODIUM 15 ML: 660; 100 LIQUID ORAL; RECTAL at 19:19

## 2024-05-03 ASSESSMENT — ENCOUNTER SYMPTOMS
EYE PAIN: 0
BELCHING: 0
COUGH: 0
FLATUS: 0
COLOR CHANGE: 0
ABDOMINAL PAIN: 1
NAUSEA: 1
EYE REDNESS: 0
CHEST TIGHTNESS: 0
VOMITING: 0
SORE THROAT: 0
BACK PAIN: 0
SHORTNESS OF BREATH: 0
APNEA: 0

## 2024-05-03 ASSESSMENT — PAIN DESCRIPTION - ORIENTATION: ORIENTATION: LOWER;MID

## 2024-05-03 ASSESSMENT — PAIN DESCRIPTION - PAIN TYPE: TYPE: ACUTE PAIN

## 2024-05-03 ASSESSMENT — PAIN DESCRIPTION - FREQUENCY: FREQUENCY: INTERMITTENT

## 2024-05-03 ASSESSMENT — PAIN DESCRIPTION - LOCATION: LOCATION: ABDOMEN

## 2024-05-03 ASSESSMENT — PAIN DESCRIPTION - DIRECTION: RADIATING_TOWARDS: BACK

## 2024-05-03 ASSESSMENT — PAIN - FUNCTIONAL ASSESSMENT
PAIN_FUNCTIONAL_ASSESSMENT: ACTIVITIES ARE NOT PREVENTED
PAIN_FUNCTIONAL_ASSESSMENT: 0-10

## 2024-05-03 ASSESSMENT — LIFESTYLE VARIABLES
HOW MANY STANDARD DRINKS CONTAINING ALCOHOL DO YOU HAVE ON A TYPICAL DAY: PATIENT DOES NOT DRINK
HOW OFTEN DO YOU HAVE A DRINK CONTAINING ALCOHOL: NEVER

## 2024-05-03 ASSESSMENT — PAIN DESCRIPTION - ONSET: ONSET: ON-GOING

## 2024-05-03 ASSESSMENT — PAIN SCALES - GENERAL: PAINLEVEL_OUTOF10: 4

## 2024-05-03 ASSESSMENT — PAIN DESCRIPTION - DESCRIPTORS: DESCRIPTORS: ACHING

## 2024-05-03 NOTE — ED PROVIDER NOTES
Emergency Department Provider Note       PCP: Arias Dueñas DO   Age: 78 y.o.   Sex: female     DISPOSITION       No diagnosis found.    Medical Decision Making     Patient is mostly well-appearing here.  Does have a ventral hernia without any clinical symptoms of obvious strangulation.  Nevertheless will obtain screening labs and likely will obtain definitive imaging of abdomen.  Will obtain urinalysis as well    9:19 PM EDT  Labs do show white count of 11,000.  No left shift.  Hemoglobin stable.  Creatinine today is 4.76.  Most recent from outpatient labs is 4.68 she would appear closely stable.  Normal LFTs.  Urinalysis does show signs of infection.  Also has some yeast.  CT scan shows ventral hernia which does show a loop of the intestines which may be incarceration however clinical exam reveals a soft hernia is easily reducible.  Nevertheless there are no  gross signs of obstruction or strangulation on CT.    She has not had any vomiting here.  Is mostly pain controlled.  Plan to refer for general surgery for outpatient follow-up.       1 or more chronic illnesses with a severe exacerbation or progression.  1 acute illness with systemic symptoms.  Prescription drug management performed.  Chronic medical problems impacting care include chronic kidney disease.  Shared medical decision making was utilized in creating the patients health plan today.    I independently ordered and reviewed each unique test.  I reviewed external records: ED visit note from an outside group.  I reviewed external records: provider visit note from PCP.  I reviewed external records: provider visit note from outside specialist.  I reviewed external records: previous EKG including cardiologist interpretation.    I reviewed external records: previous lab results from outside ED.  I reviewed external records: previous imaging study including radiologist interpretation.     I interpreted the CT Scan ventral hernia

## 2024-05-04 NOTE — DISCHARGE SUMMARY
EMERGENCY DEPARTMENT HISTORY AND PHYSICAL EXAM    10:13 AM      Date: 12/10/2018  Patient Name: Hardik Leyva    History of Presenting Illness     Chief Complaint   Patient presents with    Mental Health Problem    Alcohol Problem         History Provided By: Patient    Chief Complaint: Substance abuse problem  Duration: 1 Weeks  Timing:  Acute  Location: N/A as complaint is not pain  Quality: etOH and cocaine relapse  Severity: N/A as complaint is not pain  Modifying Factors: worsened by work stressors  Associated Symptoms: anxiety, depression, anger      Additional History (Context): Hardik Leyva is a 39 y.o. male with depression, drug-seeking behavior, herniated disks who presents with acute substance abuse problem starting 1 wk ago. Pt states he was seen here last week for detox request. He was sent to [facility], said he had a good experiece and would like to return. He left rehab 1 wk ago to return to work and notes he relapsed soon after. He started using etOH and cocaine again. He states cocaine use is infrequent but he still did it; he consumes etOH the most. Associated sx include anxiety, depression, anger. He states he feels things would be better if he wasn't here but he is not suicidal and doesn't have a plan. He is also c/o acute on chronic back pain from herniated disks, b/l anterior thigh numbness which is localized per pt, and acute on chronic, worsening inguinal hernia pain. Pt was prescribed Klonopin, Sertraline, Risperadone, Vistaril by psychiatrist that he saw last week. PCP: None    Current Outpatient Medications   Medication Sig Dispense Refill    risperiDONE (RISPERDAL) 1 mg tablet Take 1 Tab by mouth two (2) times a day. 30 Tab 0    clonazePAM (KLONOPIN) 1 mg tablet Take 0.5 Tabs by mouth two (2) times a day. Max Daily Amount: 1 mg. 20 Tab 0    hydrOXYzine pamoate (VISTARIL) 25 mg capsule Take 1 Cap by mouth three (3) times daily as needed for Itching.  30 Cap 0    I have reviewed discharge instructions with the patient.  The patient verbalized understanding.    Patient left ED via Discharge Method: ambulatory to Home with brother.    Opportunity for questions and clarification provided.       Patient given 2 scripts.         To continue your aftercare when you leave the hospital, you may receive an automated call from our care team to check in on how you are doing.  This is a free service and part of our promise to provide the best care and service to meet your aftercare needs.” If you have questions, or wish to unsubscribe from this service please call [phone].  Thank you for Choosing our [facility] Emergency Department.     SITagliptin (JANUVIA) 100 mg tablet Take 1 Tab by mouth daily. 30 Tab 2    metoprolol succinate (TOPROL-XL) 100 mg tablet TAKE 1 TABLET BY MOUTH DAILY 30 Tab 0    Blood-Glucose Meter (CONTOUR NEXT METER) misc Check blood sugar twice daily 1 Each 0    glucose blood VI test strips (CONTOUR NEXT TEST STRIPS) strip Check blood sugar twice daily 200 Strip 3    Lancets misc Check fasting blood sugar once daily 100 Each 0       Past History     Past Medical History:  Past Medical History:   Diagnosis Date    Depression     Diabetes ([facility] [address]     Drug-seeking behavior     56 prescriptions from 32 different prescribers at 6 different pharmacies in last 12 months    Herniated lumbar intervertebral disc     Hypertension     Neurological disorder L3,4,5 torn Herniated disc    Obesity (BMI 30.0-34. 9)        Past Surgical History:  Past Surgical History:   Procedure Laterality Date    HX HERNIA REPAIR Bilateral 06/02/2017    robotic repair of bilateral indirect inguinal hernias with placement of mesh    HX ORTHOPAEDIC  trigger finger release       Family History:  Family History   Problem Relation Age of Onset    Hypertension Mother     Diabetes Mother     Heart Failure Mother     COPD Mother     Heart Disease Mother     Hypertension Father     Alcohol abuse Father        Social History:  Social History     Tobacco Use    Smoking status: Never Smoker    Smokeless tobacco: Never Used   Substance Use Topics    Alcohol use: Yes     Comment: rarely    Drug use: No       Allergies:  No Known Allergies      Review of Systems       Review of Systems   Constitutional: Negative for chills and fever. Respiratory: Negative for shortness of breath. Cardiovascular: Negative for chest pain. Gastrointestinal: Positive for abdominal pain. Negative for nausea and vomiting. Musculoskeletal: Positive for back pain. Psychiatric/Behavioral: Negative for suicidal ideas. The patient is nervous/anxious.          + anger  + depression   All other systems reviewed and are negative. Physical Exam     Visit Vitals  BP (!) 149/100   Pulse 91   Temp 97.8 °F (36.6 °C)   Resp 18   Ht 5' 10.5\" (1.791 m)   Wt 99.8 kg (220 lb)   SpO2 97%   BMI 31.12 kg/m²         Physical Exam   Constitutional: He is oriented to person, place, and time. He appears well-developed and well-nourished. No distress. HENT:   Head: Normocephalic and atraumatic. Eyes: Conjunctivae and EOM are normal. Right eye exhibits no discharge. Left eye exhibits no discharge. No scleral icterus. Neck: Normal range of motion. Neck supple. No tracheal deviation present. Cardiovascular: Normal rate, regular rhythm and normal heart sounds. No murmur heard. Pulmonary/Chest: Effort normal and breath sounds normal. No respiratory distress. He has no wheezes. He has no rales. Abdominal: Soft. He exhibits no distension. There is tenderness in the left lower quadrant. There is no rebound and no guarding. Musculoskeletal: Normal range of motion. He exhibits no edema or deformity. L lumbar paraspinal ttp   Neurological: He is alert and oriented to person, place, and time. No cranial nerve deficit. Mild decreased sensation b/l anterior thighs   Skin: Skin is warm and dry. He is not diaphoretic. Psychiatric: He has a normal mood and affect. His behavior is normal. Judgment and thought content normal.         Diagnostic Study Results     Labs -  No results found for this or any previous visit (from the past 12 hour(s)). Radiologic Studies -   CT ABD PELV W CONT   Final Result   IMPRESSION:      1. No acute intra-abdominal or pelvic abnormality demonstrated. 2. Diverticulosis of the distal descending and sigmoid colon, as previously;   without findings to suggest diverticulitis. 3. Mild and diffuse hepatic hypoattenuation suggesting underlying steatosis.            Ct Abd Pelv W Cont    Result Date: 12/10/2018  EXAM: CT of the abdomen and pelvis INDICATION: Left lower quadrant abdominal pain COMPARISON: Abdominal/pelvic CT 2/19/2018 TECHNIQUE: Axial CT imaging of the abdomen and pelvis was performed without oral and with intravenous contrast. Multiplanar reformats were generated. One or more dose reduction techniques were used on this CT: automated exposure control, adjustment of the mAs and/or kVp according to patient's size, and iterative reconstruction techniques. The specific techniques utilized on this CT exam have been documented in the patient's electronic medical record. _______________ FINDINGS: LOWER CHEST: Minor dependent atelectasis is present at the lung bases bilaterally. Normal cardiac size. No pericardial effusion. LIVER, BILIARY: Diffuse hepatic hypoattenuation is present. No focal hepatic lesion. No biliary dilation. Gallbladder is unremarkable. PANCREAS: Normal. SPLEEN: Normal. ADRENALS: Normal. KIDNEYS/URETERS/BLADDER: Symmetric renal enhancement. No hydronephrosis. No nephrolithiasis. Lateral lower pole left renal cyst present, unchanged. No distal ureteral or urinary bladder stone. PELVIC ORGANS: Unremarkable. VASCULATURE: Normal aortic course and caliber. LYMPH NODES: No enlarged lymph nodes. GASTROINTESTINAL TRACT: The stomach, small intestine, and large intestine are normal in course and caliber. No evidence of bowel obstruction. No free intraperitoneal gas. Normal appendix. Diverticulosis of the distal descending and sigmoid colon is present without associated diverticulitis. BONES: No acute or aggressive osseous abnormalities identified. OTHER: Tiny fat-containing umbilical hernia. _______________     IMPRESSION: 1. No acute intra-abdominal or pelvic abnormality demonstrated. 2. Diverticulosis of the distal descending and sigmoid colon, as previously; without findings to suggest diverticulitis. 3. Mild and diffuse hepatic hypoattenuation suggesting underlying steatosis.          Medical Decision Making   I am the first provider for this patient. I reviewed the vital signs, available nursing notes, past medical history, past surgical history, family history and social history. Vital Signs-Reviewed the patient's vital signs. Pulse Oximetry Analysis -  99% on room air (Interpretation) wnl      Cardiac Monitor:  Rate: 109           Records Reviewed: Nursing Notes (Time of Review: 10:13 AM)    ED Course: Progress Notes, Reevaluation, and Consults:    13:28 Consult:  Discussed care with Jorge Lozano from Case Management. Standard discussion; including history of patients chief complaint, available diagnostic results, and treatment course. Will call [facility]. 14:22 Consult:  Discussed care with Dr. Jose Mathew (Telepsychiatry). Standard discussion; including history of patients chief complaint, available diagnostic results, and treatment course. Will evaluate the pt.     15:00 Dr. Jose Mathew recommended drug rehab. Provider Notes (Medical Decision Making):     Pt with substance abuse disorder and recent relapse. Also with abd and back pain, no acute pathology on CT. He will be transitioned to Dr. Jamel Mclaughlin pending placement. Diagnosis     Clinical Impression:   1. Depression with suicidal ideation        Disposition:     1900 : Pt care transferred to Dr. Jamel Mclaughlin, ED provider. History of patient complaint(s), available diagnostic reports and current treatment plan has been discussed thoroughly. Bedside rounding on patient occured : yes . Intended disposition of patient : Transfer  Pending diagnostics reports and/or labs (please list): Pending rehab placement       Medication List      CHANGE how you take these medications    clonazePAM 1 mg tablet  Commonly known as:  KlonoPIN  Take 0.5 Tabs by mouth two (2) times a day. Max Daily Amount: 1 mg.   What changed:  how much to take        CONTINUE taking these medications    metoprolol succinate 100 mg tablet  Commonly known as:  TOPROL-XL  TAKE 1 TABLET BY MOUTH DAILY     risperiDONE 1 mg tablet  Commonly known as:  RisperDAL  Take 1 Tab by mouth two (2) times a day. SITagliptin 100 mg tablet  Commonly known as:  JANUVIA  Take 1 Tab by mouth daily. STOP taking these medications    amLODIPine 5 mg tablet  Commonly known as:  NORVASC     cyclobenzaprine 10 mg tablet  Commonly known as:  FLEXERIL        ASK your doctor about these medications    Blood-Glucose Meter Misc  Commonly known as:  CONTOUR NEXT METER  Check blood sugar twice daily     glucose blood VI test strips strip  Commonly known as:  CONTOUR NEXT TEST STRIPS  Check blood sugar twice daily     hydrOXYzine pamoate 25 mg capsule  Commonly known as:  VISTARIL  Take 1 Cap by mouth three (3) times daily as needed for Itching. lancets Misc  Check fasting blood sugar once daily           Where to Get Your Medications      Information about where to get these medications is not yet available    Ask your nurse or doctor about these medications  · clonazePAM 1 mg tablet  · risperiDONE 1 mg tablet       _______________________________       Scribe Attestation     Leonidas Pelayo acting as a scribe for and in the presence of Andie Bojorquez MD      December 10, 2018 at 10:13 AM       Provider Attestation:      I personally performed the services described in the documentation, reviewed the documentation, as recorded by the scribe in my presence, and it accurately and completely records my words and actions.  December 10, 2018 at 10:13 AM - Andie Bojorquez MD        _______________________________

## 2024-05-05 LAB
BACTERIA SPEC CULT: NORMAL
BACTERIA SPEC CULT: NORMAL
SERVICE CMNT-IMP: NORMAL

## 2024-05-06 ENCOUNTER — CARE COORDINATION (OUTPATIENT)
Dept: CARE COORDINATION | Facility: CLINIC | Age: 79
End: 2024-05-06

## 2024-05-06 LAB
BACTERIA SPEC CULT: NORMAL
BACTERIA SPEC CULT: NORMAL
SERVICE CMNT-IMP: NORMAL

## 2024-05-06 NOTE — CARE COORDINATION
Ambulatory Care Coordination Note     5/6/2024 11:21 AM     Outreach in follow up to ED visit 5/3/2024  Unable to reach, left a VM    Ventral hernia - moderate pain, worsening over last 5 days, nausea  CKD - high creatinine > sees nephrology, not on dialysis at present  HTN - controlled by medication     ACM: Chinyere Prescott, NAT     Challenges to be reviewed by the provider   Additional needs identified to be addressed with provider Yes  Do you want to see her sooner than 5/24/2024?               Method of communication with provider: chart routing.      PCP/Specialist follow up:   Future Appointments         Provider Specialty Dept Phone    5/24/2024 2:15 PM Arias Dueñas, DO Internal Medicine [phone]

## 2024-05-15 RX ORDER — FUROSEMIDE 20 MG/1
20 TABLET ORAL DAILY
Qty: 90 TABLET | Refills: 1 | Status: CANCELLED | OUTPATIENT
Start: 2024-05-15

## 2024-05-15 RX ORDER — LISINOPRIL 40 MG/1
40 TABLET ORAL DAILY
Qty: 90 TABLET | Refills: 1 | Status: CANCELLED | OUTPATIENT
Start: 2024-05-15

## 2024-05-23 ENCOUNTER — OFFICE VISIT (OUTPATIENT)
Dept: SURGERY | Age: 79
End: 2024-05-23
Payer: MEDICARE

## 2024-05-23 VITALS
SYSTOLIC BLOOD PRESSURE: 145 MMHG | HEART RATE: 89 BPM | BODY MASS INDEX: 41.75 KG/M2 | WEIGHT: 250.9 LBS | DIASTOLIC BLOOD PRESSURE: 77 MMHG

## 2024-05-23 DIAGNOSIS — E66.01 MORBID OBESITY (HCC): ICD-10-CM

## 2024-05-23 DIAGNOSIS — K43.9 VENTRAL HERNIA WITHOUT OBSTRUCTION OR GANGRENE: Primary | ICD-10-CM

## 2024-05-23 PROCEDURE — G8399 PT W/DXA RESULTS DOCUMENT: HCPCS | Performed by: SURGERY

## 2024-05-23 PROCEDURE — G8427 DOCREV CUR MEDS BY ELIG CLIN: HCPCS | Performed by: SURGERY

## 2024-05-23 PROCEDURE — 4004F PT TOBACCO SCREEN RCVD TLK: CPT | Performed by: SURGERY

## 2024-05-23 PROCEDURE — G8417 CALC BMI ABV UP PARAM F/U: HCPCS | Performed by: SURGERY

## 2024-05-23 PROCEDURE — 1090F PRES/ABSN URINE INCON ASSESS: CPT | Performed by: SURGERY

## 2024-05-23 PROCEDURE — 1123F ACP DISCUSS/DSCN MKR DOCD: CPT | Performed by: SURGERY

## 2024-05-23 PROCEDURE — 99203 OFFICE O/P NEW LOW 30 MIN: CPT | Performed by: SURGERY

## 2024-05-23 NOTE — PROGRESS NOTES
Date: 2024      Name: Triny Ruvalcaba      MRN: [id number]       : 1945       Age: 78 y.o.    Sex: female        Brothers, Arias BANSAL, DO       CC:  No chief complaint on file.      HPI:     Triny Ruvalcaba is a 78 y.o. female who presents for evaluation of a ventral hernia as a referral from the ED where she was seen on 5/3/24. Dr. Howie Wilder in the ED wrote on 5/3/24:    Patient is mostly well-appearing here.  Does have a ventral hernia without any clinical symptoms of obvious strangulation.  Nevertheless will obtain screening labs and likely will obtain definitive imaging of abdomen.  Will obtain urinalysis as well     9:19 PM EDT  Labs do show white count of 11,000.  No left shift.  Hemoglobin stable.  Creatinine today is 4.76.  Most recent from outpatient labs is 4.68 she would appear closely stable.  Normal LFTs.  Urinalysis does show signs of infection.  Also has some yeast.  CT scan shows ventral hernia which does show a loop of the intestines which may be incarceration however clinical exam reveals a soft hernia is easily reducible.  Nevertheless there are no  gross signs of obstruction or strangulation on CT.     She has not had any vomiting here.  Is mostly pain controlled.  Plan to refer for general surgery for outpatient follow-up.     24: A CT scan was done which showed:    NDICATION:  ventral hernia pain : ventral hernia pain     TECHNIQUE:  Axial computed tomography images of the abdomen and pelvis  without intravenous contrast.  Sagittal and coronal reformatted images  were created and reviewed.  This CT exam was performed using one or  more of the following dose reduction techniques:  automated exposure  control, adjustment of the mA and/or kV according to patient size,  and/or use of iterative reconstruction technique.     COMPARISON:  2022     FINDINGS:     Lung bases:  Unremarkable.  No mass.  No consolidation.     ABDOMEN:     Liver:  Unremarkable.     Gallbladder and

## 2024-05-24 ENCOUNTER — OFFICE VISIT (OUTPATIENT)
Dept: INTERNAL MEDICINE CLINIC | Facility: CLINIC | Age: 79
End: 2024-05-24
Payer: MEDICARE

## 2024-05-24 VITALS
HEART RATE: 82 BPM | SYSTOLIC BLOOD PRESSURE: 134 MMHG | DIASTOLIC BLOOD PRESSURE: 80 MMHG | WEIGHT: 254.2 LBS | HEIGHT: 65 IN | BODY MASS INDEX: 42.35 KG/M2 | OXYGEN SATURATION: 99 %

## 2024-05-24 DIAGNOSIS — K58.0 IRRITABLE BOWEL SYNDROME WITH DIARRHEA: ICD-10-CM

## 2024-05-24 DIAGNOSIS — N18.6 ESRD (END STAGE RENAL DISEASE) (HCC): ICD-10-CM

## 2024-05-24 DIAGNOSIS — K43.9 VENTRAL HERNIA WITHOUT OBSTRUCTION OR GANGRENE: Primary | ICD-10-CM

## 2024-05-24 DIAGNOSIS — I12.9 BENIGN HYPERTENSION WITH CHRONIC KIDNEY DISEASE: ICD-10-CM

## 2024-05-24 PROBLEM — E78.00 HYPERCHOLESTEROLEMIA: Status: ACTIVE | Noted: 2022-09-28

## 2024-05-24 PROBLEM — N39.0 ACUTE UTI: Status: ACTIVE | Noted: 2021-03-13

## 2024-05-24 PROBLEM — E87.20 METABOLIC ACIDOSIS: Status: ACTIVE | Noted: 2024-05-01

## 2024-05-24 PROBLEM — R10.9 ABDOMINAL PAIN: Status: ACTIVE | Noted: 2021-03-13

## 2024-05-24 PROBLEM — R30.0 DIFFICULT OR PAINFUL URINATION: Status: ACTIVE | Noted: 2021-03-13

## 2024-05-24 PROBLEM — D50.9 IRON DEFICIENCY ANEMIA: Status: ACTIVE | Noted: 2022-12-08

## 2024-05-24 PROCEDURE — 1090F PRES/ABSN URINE INCON ASSESS: CPT | Performed by: FAMILY MEDICINE

## 2024-05-24 PROCEDURE — 1123F ACP DISCUSS/DSCN MKR DOCD: CPT | Performed by: FAMILY MEDICINE

## 2024-05-24 PROCEDURE — G8427 DOCREV CUR MEDS BY ELIG CLIN: HCPCS | Performed by: FAMILY MEDICINE

## 2024-05-24 PROCEDURE — 99214 OFFICE O/P EST MOD 30 MIN: CPT | Performed by: FAMILY MEDICINE

## 2024-05-24 PROCEDURE — 4004F PT TOBACCO SCREEN RCVD TLK: CPT | Performed by: FAMILY MEDICINE

## 2024-05-24 PROCEDURE — G8417 CALC BMI ABV UP PARAM F/U: HCPCS | Performed by: FAMILY MEDICINE

## 2024-05-24 PROCEDURE — G8399 PT W/DXA RESULTS DOCUMENT: HCPCS | Performed by: FAMILY MEDICINE

## 2024-05-24 RX ORDER — AMLODIPINE BESYLATE 10 MG/1
10 TABLET ORAL DAILY
Qty: 90 TABLET | Refills: 1 | Status: SHIPPED | OUTPATIENT
Start: 2024-05-24

## 2024-05-24 RX ORDER — LEVOCETIRIZINE DIHYDROCHLORIDE 5 MG/1
5 TABLET, FILM COATED ORAL DAILY
Qty: 90 TABLET | Refills: 1 | Status: SHIPPED | OUTPATIENT
Start: 2024-05-24

## 2024-05-24 RX ORDER — SODIUM BICARBONATE 650 MG/1
650 TABLET ORAL 2 TIMES DAILY
Qty: 180 TABLET | Refills: 1 | Status: SHIPPED | OUTPATIENT
Start: 2024-05-24

## 2024-05-24 RX ORDER — MONTELUKAST SODIUM 4 MG/1
1 TABLET, CHEWABLE ORAL 2 TIMES DAILY
Qty: 180 TABLET | Refills: 1 | Status: SHIPPED | OUTPATIENT
Start: 2024-05-24

## 2024-05-24 SDOH — ECONOMIC STABILITY: FOOD INSECURITY: WITHIN THE PAST 12 MONTHS, THE FOOD YOU BOUGHT JUST DIDN'T LAST AND YOU DIDN'T HAVE MONEY TO GET MORE.: NEVER TRUE

## 2024-05-24 SDOH — ECONOMIC STABILITY: INCOME INSECURITY: HOW HARD IS IT FOR YOU TO PAY FOR THE VERY BASICS LIKE FOOD, HOUSING, MEDICAL CARE, AND HEATING?: NOT HARD AT ALL

## 2024-05-24 SDOH — ECONOMIC STABILITY: FOOD INSECURITY: WITHIN THE PAST 12 MONTHS, YOU WORRIED THAT YOUR FOOD WOULD RUN OUT BEFORE YOU GOT MONEY TO BUY MORE.: NEVER TRUE

## 2024-05-24 ASSESSMENT — PATIENT HEALTH QUESTIONNAIRE - PHQ9
SUM OF ALL RESPONSES TO PHQ QUESTIONS 1-9: 0
2. FEELING DOWN, DEPRESSED OR HOPELESS: NOT AT ALL
SUM OF ALL RESPONSES TO PHQ QUESTIONS 1-9: 0
SUM OF ALL RESPONSES TO PHQ QUESTIONS 1-9: 0
SUM OF ALL RESPONSES TO PHQ9 QUESTIONS 1 & 2: 0
1. LITTLE INTEREST OR PLEASURE IN DOING THINGS: NOT AT ALL
SUM OF ALL RESPONSES TO PHQ QUESTIONS 1-9: 0

## 2024-05-24 NOTE — PROGRESS NOTES
Arias Dueñas DO  Diplomate of the [employer] Board of [employer] Medicine  [facility]      Triny Ruvalcaba (: 1945) is a 78 y.o. female, here for evaluation of the following chief complaint(s):  Hypertension (Follow up and medication refills)       ASSESSMENT/PLAN:  1. Ventral hernia without obstruction or gangrene  2. ESRD (end stage renal disease) (HCC)  -     sodium bicarbonate 650 MG tablet; Take 1 tablet by mouth 2 times daily, Disp-180 tablet, R-1Normal  3. Benign hypertension with chronic kidney disease  -     amLODIPine (NORVASC) 10 MG tablet; Take 1 tablet by mouth daily, Disp-90 tablet, R-1Normal  4. Irritable bowel syndrome with diarrhea  -     colestipol (COLESTID) 1 g tablet; Take 1 tablet by mouth 2 times daily, Disp-180 tablet, R-1Normal     Planned surgical repair.  To ER for signs of incarceration.  Continue with sodium bicarbonate for renal protection.  Continue to follow-up with nephrology for end-stage renal disease.  Will continue with amlodipine for hypertension is providing good clinical benefit and tolerating well.  Will continue with colestipol for IBS with diarrhea.          SUBJECTIVE/OBJECTIVE:  HPI: Patient comes in today for follow-up regarding her hypertension.  Blood pressures have been well-controlled.  Lisinopril was stopped by nephrology due to her worsening renal function.  She denies any chest pain/palpitations/exertional pain or pressure.  Was recently diagnosed with a large ventral hernia.  She will be undergoing repair within the next couple of weeks.  Doing fine with IBS with diarrhea.  Colestipol is providing good clinical benefit for her.  ROS negative except as noted above today.    Social History     Tobacco Use    Smoking status: Every Day     Current packs/day: 0.00     Average packs/day: 0.3 packs/day for 20.0 years (5.0 ttl pk-yrs)     Types: Cigarettes     Start date: 6/3/1996     Last attempt to quit: 6/3/2016     Years since quitting:

## 2024-05-28 ENCOUNTER — PREP FOR PROCEDURE (OUTPATIENT)
Dept: SURGERY | Age: 79
End: 2024-05-28

## 2024-05-28 ENCOUNTER — TELEPHONE (OUTPATIENT)
Dept: SURGERY | Age: 79
End: 2024-05-28

## 2024-05-28 DIAGNOSIS — K43.9 VENTRAL HERNIA WITHOUT OBSTRUCTION OR GANGRENE: ICD-10-CM

## 2024-05-28 DIAGNOSIS — E66.01 MORBID OBESITY (HCC): ICD-10-CM

## 2024-05-28 DIAGNOSIS — I12.9 BENIGN HYPERTENSION WITH CHRONIC KIDNEY DISEASE: ICD-10-CM

## 2024-05-28 NOTE — TELEPHONE ENCOUNTER
Called & spoke w/ patient; confirmed scheduled SX [ OPEN VERTRAL HERNIA REPAIR W/ MESH ] w/ Dr. Crain on 6/6/2024 @ 11:50 am at [facility]. Informed patient that a Surgery Information Letter will be sent via [facility] & [employer]. Patient confirmed & verbalized understanding.

## 2024-05-29 NOTE — PERIOP NOTE
Patient verified name and .  Order for consent NOT found in EHR at time of PAT visit. Unable to verify case posting against order; surgery verified by patient.    Type 2 surgery, phone assessment complete.  Orders not received.  Labs per surgeon: none ordered  Labs per anesthesia protocol: K+, Creatinine, Hgb and EKG - patient will come in on 24 at 215 to [address] [facility] [address]    Patient answered medical/surgical history questions at their best of ability. All prior to admission medications documented in EPIC.    Patient instructed to continue taking all prescription medications up to the day of surgery but to take only the following medications the day of surgery according to anesthesia guidelines with a small sip of water: xyzal,  amlodipine, flonase if needed.  Also, patient is requested to take 2 Tylenol in the morning and then again before bed on the day before surgery. Regular or extra strength may be used.       Patient informed that all vitamins and supplements should be held 7 days prior to surgery and NSAIDS 5 days prior to surgery. Prescription meds to hold:  none    Patient instructed on the following:    > Arrive at main Entrance, time of arrival to be called the day before by 1700  > NPO after midnight, unless otherwise indicated, including gum, mints, and ice chips  > Responsible adult must drive patient to the hospital, stay during surgery, and patient will need supervision 24 hours after anesthesia  > Use non moisturizing soap in shower the night before surgery and on the morning of surgery  > All piercings must be removed prior to arrival.    > Leave all valuables (money and jewelry) at home but bring insurance card and ID on DOS.   > You may be required to pay a deductible or co-pay on the day of your procedure. You can pre-pay by calling [phone] if your surgery is at the [facility] or [phone] if your surgery is at the [facility].  > Do not wear make-up, nail

## 2024-05-30 ENCOUNTER — HOSPITAL ENCOUNTER (OUTPATIENT)
Dept: SURGERY | Age: 79
Discharge: HOME | End: 2024-05-30
Payer: MEDICARE

## 2024-05-30 LAB
CREAT SERPL-MCNC: 4.45 MG/DL (ref 0.6–1.1)
HGB BLD-MCNC: 10.2 G/DL (ref 11.7–15.4)
POTASSIUM SERPL-SCNC: 5.2 MMOL/L (ref 3.5–5.1)

## 2024-05-30 PROCEDURE — 93005 ELECTROCARDIOGRAM TRACING: CPT | Performed by: ANESTHESIOLOGY

## 2024-05-30 PROCEDURE — 85018 HEMOGLOBIN: CPT

## 2024-05-30 PROCEDURE — 82565 ASSAY OF CREATININE: CPT

## 2024-05-30 PROCEDURE — 84132 ASSAY OF SERUM POTASSIUM: CPT

## 2024-05-31 LAB
EKG ATRIAL RATE: 84 BPM
EKG DIAGNOSIS: NORMAL
EKG P AXIS: 67 DEGREES
EKG P-R INTERVAL: 144 MS
EKG Q-T INTERVAL: 358 MS
EKG QRS DURATION: 70 MS
EKG QTC CALCULATION (BAZETT): 423 MS
EKG R AXIS: -7 DEGREES
EKG T AXIS: 73 DEGREES
EKG VENTRICULAR RATE: 84 BPM

## 2024-05-31 RX ORDER — LISINOPRIL 40 MG/1
40 TABLET ORAL DAILY
Qty: 90 TABLET | Refills: 1 | OUTPATIENT
Start: 2024-05-31

## 2024-06-04 NOTE — H&P
Date: 2024      Name: Triny Ruvalcaba      MRN: [id number]       : 1945       Age: 78 y.o.    Sex: female        Brothers, Arias BANSAL, DO       CC:  No chief complaint on file.      HPI:     Triny Ruvalcaba is a 78 y.o. female who presents for evaluation of a ventral hernia as a referral from the ED where she was seen on 5/3/24. Dr. Howie Wilder in the ED wrote on 5/3/24:    Patient is mostly well-appearing here.  Does have a ventral hernia without any clinical symptoms of obvious strangulation.  Nevertheless will obtain screening labs and likely will obtain definitive imaging of abdomen.  Will obtain urinalysis as well     9:19 PM EDT  Labs do show white count of 11,000.  No left shift.  Hemoglobin stable.  Creatinine today is 4.76.  Most recent from outpatient labs is 4.68 she would appear closely stable.  Normal LFTs.  Urinalysis does show signs of infection.  Also has some yeast.  CT scan shows ventral hernia which does show a loop of the intestines which may be incarceration however clinical exam reveals a soft hernia is easily reducible.  Nevertheless there are no  gross signs of obstruction or strangulation on CT.     She has not had any vomiting here.  Is mostly pain controlled.  Plan to refer for general surgery for outpatient follow-up.     24: A CT scan was done which showed:    NDICATION:  ventral hernia pain : ventral hernia pain     TECHNIQUE:  Axial computed tomography images of the abdomen and pelvis  without intravenous contrast.  Sagittal and coronal reformatted images  were created and reviewed.  This CT exam was performed using one or  more of the following dose reduction techniques:  automated exposure  control, adjustment of the mA and/or kV according to patient size,  and/or use of iterative reconstruction technique.     COMPARISON:  2022     FINDINGS:     Lung bases:  Unremarkable.  No mass.  No consolidation.     ABDOMEN:     Liver:  Unremarkable.     Gallbladder and bile  (FLONASE) 50 MCG/ACT nasal spray Instill 2 sprays into each nostril once daily  Automatic Reconciliation, Ar   olopatadine (PATANOL) 0.1 % ophthalmic solution Apply 2 drops to eye 2 times daily as needed  Automatic Reconciliation, Ar       ALLERGIES:      Allergies   Allergen Reactions    Indomethacin Nausea Only    Levofloxacin Other (See Comments)     Yeast    Fluogen [Influenza Virus Vaccine] Rash     09/25/14-- 65+ Flu vaccine        SH:    Social History     Tobacco Use    Smoking status: Every Day     Current packs/day: 0.25     Average packs/day: 0.3 packs/day for 27.9 years (7.0 ttl pk-yrs)     Types: Cigarettes     Start date: 6/3/1996     Last attempt to quit: 6/3/2016    Smokeless tobacco: Never   Vaping Use    Vaping Use: Never used   Substance Use Topics    Alcohol use: Not Currently    Drug use: No       FH:    Family History   Problem Relation Age of Onset    Heart Disease Mother     Hypertension Mother     Hypertension Father     Cancer Father         prostate    Cancer Sister         pancreatic    Heart Disease Sister     Heart Attack Sister     No Known Problems Sister     Breast Cancer Sister 28    Cancer Brother         prostate    Hypertension Brother     Cancer Brother         prostate    Hypertension Brother     Hypertension Brother     Delayed Awakening Brother     Breast Cancer Maternal Aunt 73       ROS: The patient has no difficulty with chest pain or shortness of breath.  No fever or chills.  Comprehensive 13 point review of systems was otherwise unremarkable except as noted above.    Physical Exam:     Ht 1.651 m (5' 5\")   Wt 115.2 kg (254 lb)   BMI 42.27 kg/m²     General: Alert, oriented, cooperative black female in no acute distress.   Eyes: Sclera are clear. Conjunctiva and lids within normal limits. No icterus.  Ears and Nose: no gross deformities to visual inspection, gross hearing intact  Neck: Supple, trachea midline, no appreciable thyromegaly  Resp: Breathing is  non-labored.

## 2024-06-06 ENCOUNTER — ANESTHESIA (OUTPATIENT)
Dept: SURGERY | Age: 79
End: 2024-06-06
Payer: MEDICARE

## 2024-06-06 ENCOUNTER — ANESTHESIA EVENT (OUTPATIENT)
Dept: SURGERY | Age: 79
End: 2024-06-06
Payer: MEDICARE

## 2024-06-06 ENCOUNTER — HOSPITAL ENCOUNTER (INPATIENT)
Age: 79
LOS: 13 days | Discharge: HOME HEALTH | DRG: 353 | End: 2024-06-26
Attending: SURGERY | Admitting: SURGERY
Payer: MEDICARE

## 2024-06-06 DIAGNOSIS — N18.5 CHRONIC KIDNEY DISEASE, STAGE 5 (HCC): Primary | ICD-10-CM

## 2024-06-06 LAB — POTASSIUM BLD-SCNC: 4.9 MMOL/L (ref 3.5–5.1)

## 2024-06-06 PROCEDURE — 2720000010 HC SURG SUPPLY STERILE: Performed by: SURGERY

## 2024-06-06 PROCEDURE — 7100000001 HC PACU RECOVERY - ADDTL 15 MIN: Performed by: SURGERY

## 2024-06-06 PROCEDURE — 49593 RPR AA HRN 1ST 3-10 RDC: CPT | Performed by: SURGERY

## 2024-06-06 PROCEDURE — 6360000002 HC RX W HCPCS

## 2024-06-06 PROCEDURE — 6360000002 HC RX W HCPCS: Performed by: SURGERY

## 2024-06-06 PROCEDURE — 6370000000 HC RX 637 (ALT 250 FOR IP): Performed by: SURGERY

## 2024-06-06 PROCEDURE — G0378 HOSPITAL OBSERVATION PER HR: HCPCS

## 2024-06-06 PROCEDURE — 3700000001 HC ADD 15 MINUTES (ANESTHESIA): Performed by: SURGERY

## 2024-06-06 PROCEDURE — 2700000000 HC OXYGEN THERAPY PER DAY

## 2024-06-06 PROCEDURE — 3600000013 HC SURGERY LEVEL 3 ADDTL 15MIN: Performed by: SURGERY

## 2024-06-06 PROCEDURE — 6370000000 HC RX 637 (ALT 250 FOR IP): Performed by: ANESTHESIOLOGY

## 2024-06-06 PROCEDURE — 2500000003 HC RX 250 WO HCPCS

## 2024-06-06 PROCEDURE — 6360000002 HC RX W HCPCS: Performed by: ANESTHESIOLOGY

## 2024-06-06 PROCEDURE — 84132 ASSAY OF SERUM POTASSIUM: CPT

## 2024-06-06 PROCEDURE — 3700000000 HC ANESTHESIA ATTENDED CARE: Performed by: SURGERY

## 2024-06-06 PROCEDURE — 7100000000 HC PACU RECOVERY - FIRST 15 MIN: Performed by: SURGERY

## 2024-06-06 PROCEDURE — C1781 MESH (IMPLANTABLE): HCPCS | Performed by: SURGERY

## 2024-06-06 PROCEDURE — 2709999900 HC NON-CHARGEABLE SUPPLY: Performed by: SURGERY

## 2024-06-06 PROCEDURE — 0WUF0JZ SUPPLEMENT ABDOMINAL WALL WITH SYNTHETIC SUBSTITUTE, OPEN APPROACH: ICD-10-PCS | Performed by: SURGERY

## 2024-06-06 PROCEDURE — 3600000003 HC SURGERY LEVEL 3 BASE: Performed by: SURGERY

## 2024-06-06 PROCEDURE — 2580000003 HC RX 258: Performed by: ANESTHESIOLOGY

## 2024-06-06 PROCEDURE — 2580000003 HC RX 258: Performed by: SURGERY

## 2024-06-06 DEVICE — MESH HERN W10XL14IN INGUINAL POLYPR MFIL RECTANG: Type: IMPLANTABLE DEVICE | Site: ABDOMEN | Status: FUNCTIONAL

## 2024-06-06 RX ORDER — HYDRALAZINE HYDROCHLORIDE 20 MG/ML
10 INJECTION INTRAMUSCULAR; INTRAVENOUS EVERY 6 HOURS PRN
Status: DISCONTINUED | OUTPATIENT
Start: 2024-06-06 | End: 2024-06-11

## 2024-06-06 RX ORDER — NEOSTIGMINE METHYLSULFATE 1 MG/ML
INJECTION, SOLUTION INTRAVENOUS PRN
Status: DISCONTINUED | OUTPATIENT
Start: 2024-06-06 | End: 2024-06-06 | Stop reason: SDUPTHER

## 2024-06-06 RX ORDER — SCOLOPAMINE TRANSDERMAL SYSTEM 1 MG/1
1 PATCH, EXTENDED RELEASE TRANSDERMAL
Status: DISCONTINUED | OUTPATIENT
Start: 2024-06-06 | End: 2024-06-06

## 2024-06-06 RX ORDER — HYDROMORPHONE HYDROCHLORIDE 2 MG/ML
0.5 INJECTION, SOLUTION INTRAMUSCULAR; INTRAVENOUS; SUBCUTANEOUS EVERY 10 MIN PRN
Status: DISCONTINUED | OUTPATIENT
Start: 2024-06-06 | End: 2024-06-06 | Stop reason: HOSPADM

## 2024-06-06 RX ORDER — LIDOCAINE HYDROCHLORIDE 20 MG/ML
INJECTION, SOLUTION EPIDURAL; INFILTRATION; INTRACAUDAL; PERINEURAL PRN
Status: DISCONTINUED | OUTPATIENT
Start: 2024-06-06 | End: 2024-06-06 | Stop reason: SDUPTHER

## 2024-06-06 RX ORDER — ONDANSETRON 2 MG/ML
4 INJECTION INTRAMUSCULAR; INTRAVENOUS EVERY 6 HOURS PRN
Status: DISCONTINUED | OUTPATIENT
Start: 2024-06-06 | End: 2024-06-10 | Stop reason: SDUPTHER

## 2024-06-06 RX ORDER — HEPARIN SODIUM 5000 [USP'U]/ML
5000 INJECTION, SOLUTION INTRAVENOUS; SUBCUTANEOUS 2 TIMES DAILY
Status: DISCONTINUED | OUTPATIENT
Start: 2024-06-07 | End: 2024-06-26 | Stop reason: HOSPADM

## 2024-06-06 RX ORDER — DEXAMETHASONE SODIUM PHOSPHATE 4 MG/ML
INJECTION, SOLUTION INTRA-ARTICULAR; INTRALESIONAL; INTRAMUSCULAR; INTRAVENOUS; SOFT TISSUE PRN
Status: DISCONTINUED | OUTPATIENT
Start: 2024-06-06 | End: 2024-06-06 | Stop reason: SDUPTHER

## 2024-06-06 RX ORDER — FAMOTIDINE 20 MG/1
20 TABLET, FILM COATED ORAL 2 TIMES DAILY
Status: DISCONTINUED | OUTPATIENT
Start: 2024-06-06 | End: 2024-06-06

## 2024-06-06 RX ORDER — SODIUM CHLORIDE 0.9 % (FLUSH) 0.9 %
5-40 SYRINGE (ML) INJECTION EVERY 12 HOURS SCHEDULED
Status: DISCONTINUED | OUTPATIENT
Start: 2024-06-06 | End: 2024-06-06 | Stop reason: HOSPADM

## 2024-06-06 RX ORDER — SODIUM CHLORIDE 0.9 % (FLUSH) 0.9 %
5-40 SYRINGE (ML) INJECTION PRN
Status: DISCONTINUED | OUTPATIENT
Start: 2024-06-06 | End: 2024-06-26 | Stop reason: HOSPADM

## 2024-06-06 RX ORDER — AMLODIPINE BESYLATE 10 MG/1
10 TABLET ORAL DAILY
Status: DISCONTINUED | OUTPATIENT
Start: 2024-06-07 | End: 2024-06-26 | Stop reason: HOSPADM

## 2024-06-06 RX ORDER — KETOTIFEN FUMARATE 0.35 MG/ML
1 SOLUTION/ DROPS OPHTHALMIC 2 TIMES DAILY
Status: DISCONTINUED | OUTPATIENT
Start: 2024-06-06 | End: 2024-06-26 | Stop reason: HOSPADM

## 2024-06-06 RX ORDER — PROCHLORPERAZINE MALEATE 10 MG
10 TABLET ORAL EVERY 6 HOURS PRN
Status: DISCONTINUED | OUTPATIENT
Start: 2024-06-06 | End: 2024-06-26 | Stop reason: HOSPADM

## 2024-06-06 RX ORDER — FENTANYL CITRATE 50 UG/ML
100 INJECTION, SOLUTION INTRAMUSCULAR; INTRAVENOUS
Status: DISCONTINUED | OUTPATIENT
Start: 2024-06-06 | End: 2024-06-06 | Stop reason: HOSPADM

## 2024-06-06 RX ORDER — ROCURONIUM BROMIDE 10 MG/ML
INJECTION, SOLUTION INTRAVENOUS PRN
Status: DISCONTINUED | OUTPATIENT
Start: 2024-06-06 | End: 2024-06-06 | Stop reason: SDUPTHER

## 2024-06-06 RX ORDER — PROPOFOL 10 MG/ML
INJECTION, EMULSION INTRAVENOUS PRN
Status: DISCONTINUED | OUTPATIENT
Start: 2024-06-06 | End: 2024-06-06 | Stop reason: SDUPTHER

## 2024-06-06 RX ORDER — SODIUM CHLORIDE, SODIUM LACTATE, POTASSIUM CHLORIDE, CALCIUM CHLORIDE 600; 310; 30; 20 MG/100ML; MG/100ML; MG/100ML; MG/100ML
INJECTION, SOLUTION INTRAVENOUS CONTINUOUS
Status: DISCONTINUED | OUTPATIENT
Start: 2024-06-06 | End: 2024-06-06 | Stop reason: HOSPADM

## 2024-06-06 RX ORDER — SODIUM CHLORIDE AND POTASSIUM CHLORIDE 150; 900 MG/100ML; MG/100ML
INJECTION, SOLUTION INTRAVENOUS CONTINUOUS
Status: DISCONTINUED | OUTPATIENT
Start: 2024-06-06 | End: 2024-06-07

## 2024-06-06 RX ORDER — HYDROMORPHONE HYDROCHLORIDE 2 MG/ML
0.5 INJECTION, SOLUTION INTRAMUSCULAR; INTRAVENOUS; SUBCUTANEOUS
Status: DISCONTINUED | OUTPATIENT
Start: 2024-06-06 | End: 2024-06-06 | Stop reason: SDUPTHER

## 2024-06-06 RX ORDER — MAGNESIUM SULFATE 1 G/100ML
1000 INJECTION INTRAVENOUS PRN
Status: DISCONTINUED | OUTPATIENT
Start: 2024-06-06 | End: 2024-06-26 | Stop reason: HOSPADM

## 2024-06-06 RX ORDER — SODIUM CHLORIDE 9 MG/ML
INJECTION, SOLUTION INTRAVENOUS PRN
Status: DISCONTINUED | OUTPATIENT
Start: 2024-06-06 | End: 2024-06-26 | Stop reason: HOSPADM

## 2024-06-06 RX ORDER — SODIUM CHLORIDE 0.9 % (FLUSH) 0.9 %
5-40 SYRINGE (ML) INJECTION EVERY 12 HOURS SCHEDULED
Status: DISCONTINUED | OUTPATIENT
Start: 2024-06-06 | End: 2024-06-26 | Stop reason: HOSPADM

## 2024-06-06 RX ORDER — OXYCODONE HYDROCHLORIDE 5 MG/1
5 TABLET ORAL EVERY 4 HOURS PRN
Status: DISCONTINUED | OUTPATIENT
Start: 2024-06-06 | End: 2024-06-26 | Stop reason: HOSPADM

## 2024-06-06 RX ORDER — FAMOTIDINE 20 MG/1
20 TABLET, FILM COATED ORAL DAILY
Status: DISCONTINUED | OUTPATIENT
Start: 2024-06-07 | End: 2024-06-17 | Stop reason: SDUPTHER

## 2024-06-06 RX ORDER — POTASSIUM CHLORIDE 7.45 MG/ML
10 INJECTION INTRAVENOUS PRN
Status: DISCONTINUED | OUTPATIENT
Start: 2024-06-06 | End: 2024-06-26 | Stop reason: HOSPADM

## 2024-06-06 RX ORDER — PROCHLORPERAZINE EDISYLATE 5 MG/ML
10 INJECTION INTRAMUSCULAR; INTRAVENOUS EVERY 6 HOURS PRN
Status: DISCONTINUED | OUTPATIENT
Start: 2024-06-06 | End: 2024-06-26 | Stop reason: HOSPADM

## 2024-06-06 RX ORDER — OXYCODONE HYDROCHLORIDE 5 MG/1
10 TABLET ORAL EVERY 4 HOURS PRN
Status: DISCONTINUED | OUTPATIENT
Start: 2024-06-06 | End: 2024-06-26 | Stop reason: HOSPADM

## 2024-06-06 RX ORDER — ONDANSETRON 2 MG/ML
4 INJECTION INTRAMUSCULAR; INTRAVENOUS
Status: DISCONTINUED | OUTPATIENT
Start: 2024-06-06 | End: 2024-06-06 | Stop reason: HOSPADM

## 2024-06-06 RX ORDER — GLYCOPYRROLATE 0.2 MG/ML
INJECTION INTRAMUSCULAR; INTRAVENOUS PRN
Status: DISCONTINUED | OUTPATIENT
Start: 2024-06-06 | End: 2024-06-06 | Stop reason: SDUPTHER

## 2024-06-06 RX ORDER — SIMETHICONE 80 MG
80 TABLET,CHEWABLE ORAL EVERY 6 HOURS PRN
Status: DISCONTINUED | OUTPATIENT
Start: 2024-06-06 | End: 2024-06-26 | Stop reason: HOSPADM

## 2024-06-06 RX ORDER — ONDANSETRON 2 MG/ML
INJECTION INTRAMUSCULAR; INTRAVENOUS PRN
Status: DISCONTINUED | OUTPATIENT
Start: 2024-06-06 | End: 2024-06-06 | Stop reason: SDUPTHER

## 2024-06-06 RX ORDER — OXYCODONE HYDROCHLORIDE 5 MG/1
5 TABLET ORAL
Status: COMPLETED | OUTPATIENT
Start: 2024-06-06 | End: 2024-06-06

## 2024-06-06 RX ORDER — METOCLOPRAMIDE HYDROCHLORIDE 5 MG/ML
10 INJECTION INTRAMUSCULAR; INTRAVENOUS
Status: DISCONTINUED | OUTPATIENT
Start: 2024-06-06 | End: 2024-06-06 | Stop reason: HOSPADM

## 2024-06-06 RX ORDER — FLUTICASONE PROPIONATE 50 MCG
1 SPRAY, SUSPENSION (ML) NASAL DAILY
Status: DISCONTINUED | OUTPATIENT
Start: 2024-06-07 | End: 2024-06-26 | Stop reason: HOSPADM

## 2024-06-06 RX ORDER — DIPHENHYDRAMINE HYDROCHLORIDE 50 MG/ML
12.5 INJECTION INTRAMUSCULAR; INTRAVENOUS
Status: DISCONTINUED | OUTPATIENT
Start: 2024-06-06 | End: 2024-06-06 | Stop reason: HOSPADM

## 2024-06-06 RX ORDER — FENTANYL CITRATE 50 UG/ML
25 INJECTION, SOLUTION INTRAMUSCULAR; INTRAVENOUS EVERY 5 MIN PRN
Status: DISCONTINUED | OUTPATIENT
Start: 2024-06-06 | End: 2024-06-06 | Stop reason: HOSPADM

## 2024-06-06 RX ORDER — NALOXONE HYDROCHLORIDE 0.4 MG/ML
INJECTION, SOLUTION INTRAMUSCULAR; INTRAVENOUS; SUBCUTANEOUS PRN
Status: DISCONTINUED | OUTPATIENT
Start: 2024-06-06 | End: 2024-06-06 | Stop reason: HOSPADM

## 2024-06-06 RX ORDER — CETIRIZINE HYDROCHLORIDE 10 MG/1
5 TABLET ORAL DAILY
Status: DISCONTINUED | OUTPATIENT
Start: 2024-06-07 | End: 2024-06-26 | Stop reason: HOSPADM

## 2024-06-06 RX ORDER — FENTANYL CITRATE 50 UG/ML
INJECTION, SOLUTION INTRAMUSCULAR; INTRAVENOUS PRN
Status: DISCONTINUED | OUTPATIENT
Start: 2024-06-06 | End: 2024-06-06 | Stop reason: SDUPTHER

## 2024-06-06 RX ORDER — MIDAZOLAM HYDROCHLORIDE 2 MG/2ML
2 INJECTION, SOLUTION INTRAMUSCULAR; INTRAVENOUS
Status: DISCONTINUED | OUTPATIENT
Start: 2024-06-06 | End: 2024-06-06 | Stop reason: HOSPADM

## 2024-06-06 RX ADMIN — HYDROMORPHONE HYDROCHLORIDE 0.5 MG: 2 INJECTION INTRAMUSCULAR; INTRAVENOUS; SUBCUTANEOUS at 12:46

## 2024-06-06 RX ADMIN — SODIUM CHLORIDE, POTASSIUM CHLORIDE, SODIUM LACTATE AND CALCIUM CHLORIDE: 600; 310; 30; 20 INJECTION, SOLUTION INTRAVENOUS at 09:54

## 2024-06-06 RX ADMIN — SODIUM CHLORIDE, PRESERVATIVE FREE 10 ML: 5 INJECTION INTRAVENOUS at 21:00

## 2024-06-06 RX ADMIN — POTASSIUM CHLORIDE AND SODIUM CHLORIDE: 900; 150 INJECTION, SOLUTION INTRAVENOUS at 14:23

## 2024-06-06 RX ADMIN — ROCURONIUM BROMIDE 30 MG: 10 INJECTION, SOLUTION INTRAVENOUS at 10:33

## 2024-06-06 RX ADMIN — OXYCODONE 10 MG: 5 TABLET ORAL at 16:51

## 2024-06-06 RX ADMIN — FENTANYL CITRATE 25 MCG: 50 INJECTION, SOLUTION INTRAMUSCULAR; INTRAVENOUS at 11:37

## 2024-06-06 RX ADMIN — FENTANYL CITRATE 50 MCG: 50 INJECTION, SOLUTION INTRAMUSCULAR; INTRAVENOUS at 11:48

## 2024-06-06 RX ADMIN — Medication 2000 MG: at 10:38

## 2024-06-06 RX ADMIN — HYDROMORPHONE HYDROCHLORIDE 0.5 MG: 2 INJECTION INTRAMUSCULAR; INTRAVENOUS; SUBCUTANEOUS at 13:32

## 2024-06-06 RX ADMIN — DEXAMETHASONE SODIUM PHOSPHATE 4 MG: 4 INJECTION, SOLUTION INTRAMUSCULAR; INTRAVENOUS at 10:40

## 2024-06-06 RX ADMIN — ONDANSETRON 4 MG: 2 INJECTION INTRAMUSCULAR; INTRAVENOUS at 10:40

## 2024-06-06 RX ADMIN — FENTANYL CITRATE 25 MCG: 50 INJECTION, SOLUTION INTRAMUSCULAR; INTRAVENOUS at 11:30

## 2024-06-06 RX ADMIN — GLYCOPYRROLATE 0.8 MG: 0.2 INJECTION INTRAMUSCULAR; INTRAVENOUS at 11:58

## 2024-06-06 RX ADMIN — FENTANYL CITRATE 100 MCG: 50 INJECTION, SOLUTION INTRAMUSCULAR; INTRAVENOUS at 10:33

## 2024-06-06 RX ADMIN — OXYCODONE HYDROCHLORIDE 5 MG: 5 TABLET ORAL at 12:46

## 2024-06-06 RX ADMIN — LIDOCAINE HYDROCHLORIDE 100 MG: 20 INJECTION, SOLUTION EPIDURAL; INFILTRATION; INTRACAUDAL; PERINEURAL at 10:33

## 2024-06-06 RX ADMIN — OXYCODONE 10 MG: 5 TABLET ORAL at 21:37

## 2024-06-06 RX ADMIN — Medication 5 MG: at 11:58

## 2024-06-06 RX ADMIN — PROPOFOL 200 MG: 10 INJECTION, EMULSION INTRAVENOUS at 10:33

## 2024-06-06 RX ADMIN — ROCURONIUM BROMIDE 20 MG: 10 INJECTION, SOLUTION INTRAVENOUS at 10:45

## 2024-06-06 RX ADMIN — PROPOFOL 50 MG: 10 INJECTION, EMULSION INTRAVENOUS at 10:45

## 2024-06-06 RX ADMIN — ROCURONIUM BROMIDE 15 MG: 10 INJECTION, SOLUTION INTRAVENOUS at 11:35

## 2024-06-06 ASSESSMENT — PAIN DESCRIPTION - ORIENTATION
ORIENTATION: ANTERIOR
ORIENTATION: ANTERIOR

## 2024-06-06 ASSESSMENT — PAIN - FUNCTIONAL ASSESSMENT
PAIN_FUNCTIONAL_ASSESSMENT: 0-10

## 2024-06-06 ASSESSMENT — PAIN DESCRIPTION - PAIN TYPE
TYPE: SURGICAL PAIN
TYPE: SURGICAL PAIN

## 2024-06-06 ASSESSMENT — PAIN SCALES - GENERAL
PAINLEVEL_OUTOF10: 8
PAINLEVEL_OUTOF10: 7
PAINLEVEL_OUTOF10: 10
PAINLEVEL_OUTOF10: 10
PAINLEVEL_OUTOF10: 7

## 2024-06-06 ASSESSMENT — PAIN DESCRIPTION - LOCATION
LOCATION: ABDOMEN;INCISION
LOCATION: ABDOMEN;INCISION
LOCATION: ABDOMEN

## 2024-06-06 ASSESSMENT — PAIN DESCRIPTION - DESCRIPTORS
DESCRIPTORS: ACHING
DESCRIPTORS: ACHING

## 2024-06-06 NOTE — INTERVAL H&P NOTE
Update History & Physical    The patient's History and Physical of 6/4/24 was reviewed with the patient and I examined the patient. There was no change. The surgical site was confirmed by the patient and me.     Plan: The risks, benefits, expected outcome, and alternative to the recommended procedure have been discussed with the patient. Patient understands and wants to proceed with the procedure.     Electronically signed by SURESH CESAR MD on 6/6/2024 at 9:20 AM

## 2024-06-06 NOTE — PROGRESS NOTES
completed follow up visit, per consult.  Son and niece were at bedside and supportive.  Patient expressed that she felt she was recovering well and appeared to be coping.  Patient and family engaged in spiritual reflection and life review.   provided pastoral presence, prayer and empathetic listening.  Peace be with you,  Signed by  [facility] [employer].   [phone]

## 2024-06-06 NOTE — BRIEF OP NOTE
Brief Postoperative Note      Patient: Triny Ruvalcaba  YOB: 1945  MRN: [id number]    Date of Procedure: 6/6/2024    Pre-Op Diagnosis Codes:     * Ventral hernia without obstruction or gangrene [K43.9]     * Morbid obesity (HCC) [E66.01]    Post-Op Diagnosis: Same with two hernias containing bowel       Procedure(s):  OPEN VENTRAL HERNIA REPAIR W/ MESH    Surgeon(s):  Harrison Cesar MD    Assistant:  * No surgical staff found *    Anesthesia: General    Estimated Blood Loss (mL): less than 50     Complications: None    Specimens:   * No specimens in log *    Implants:  Implant Name Type Inv. Item Serial No.  Lot No. LRB No. Used Action   MESH ADELA [id number] INGUINAL POLYPR MFIL RECTANG - [id number]  MESH ADELA [id number] INGUINAL POLYPR MFIL RECTANG  BARD DAVOL- [id number] N/A 1 Implanted         Drains:   Closed/Suction Drain Abdomen (Active)       Findings:  Infection Present At Time Of Surgery (PATOS) (choose all levels that have infection present):  No infection present  Other Findings: See dictated note    Electronically signed by HARRISON CESAR MD on 6/6/2024 at 12:04 PM

## 2024-06-06 NOTE — ANESTHESIA POSTPROCEDURE EVALUATION
Department of Anesthesiology  Postprocedure Note    Patient: Triny Ruvalcaba  MRN: [id number]  YOB: 1945  Date of evaluation: 6/6/2024    Procedure Summary       Date: 06/06/24 Room / Location: [facility] MAIN OR 09 / [facility] MAIN OR    Anesthesia Start: 1023 Anesthesia Stop: 1208    Procedure: OPEN VENTRAL HERNIA REPAIR W/ MESH (Abdomen) Diagnosis:       Ventral hernia without obstruction or gangrene      Morbid obesity (HCC)      (Ventral hernia without obstruction or gangrene [K43.9])      (Morbid obesity (HCC) [E66.01])    Providers: Harrison Crain MD Responsible Provider: Teodoro Mera MD    Anesthesia Type: general ASA Status: 2            Anesthesia Type: No value filed.    Eldon Phase I: Eldon Score: 8    Eldon Phase II:      Anesthesia Post Evaluation    Patient location during evaluation: PACU  Patient participation: complete - patient participated  Level of consciousness: awake and awake and alert  Airway patency: patent  Nausea & Vomiting: no nausea  Cardiovascular status: hemodynamically stable  Respiratory status: acceptable  Hydration status: euvolemic  Multimodal analgesia pain management approach  Pain management: adequate    No notable events documented.

## 2024-06-06 NOTE — PROGRESS NOTES
TRANSFER - IN REPORT:    Verbal report received from NAT Avila on Triny Ruvalcaba  being received from PACU for routine post-op      Report consisted of patient's Situation, Background, Assessment and   Recommendations(SBAR).     Information from the following report(s) Nurse Handoff Report was reviewed with the receiving nurse.    Opportunity for questions and clarification was provided.      Assessment completed upon patient's arrival to unit and care assumed.

## 2024-06-06 NOTE — ANESTHESIA PRE PROCEDURE
Department of Anesthesiology  Preprocedure Note       Name:  Triny Ruvalcaba   Age:  78 y.o.  :  1945                                          MRN:  [id number]         Date:  2024      Surgeon: Surgeon(s):  Harrison Crain MD    Procedure: Procedure(s):  OPEN VENTRAL HERNIA REPAIR W/ MESH    Medications prior to admission:   Prior to Admission medications    Medication Sig Start Date End Date Taking? Authorizing Provider   MAGNESIUM PO Take by mouth Daily with lunch   Yes Provider, MD Olu   sodium bicarbonate 650 MG tablet Take 1 tablet by mouth 2 times daily 24   Arias Dueñas,    levocetirizine (XYZAL) 5 MG tablet Take 1 tablet by mouth daily 24   Arias Dueñas DO   colestipol (COLESTID) 1 g tablet Take 1 tablet by mouth 2 times daily 24   Arias Dueñas,    amLODIPine (NORVASC) 10 MG tablet Take 1 tablet by mouth daily 24   Arias Dueñas DO   furosemide (LASIX) 20 MG tablet TAKE 1 TABLET BY MOUTH DAILY 10/10/23   Arias Dueñas,    acetaminophen (TYLENOL) 650 MG extended release tablet Take 4 tablets by mouth daily (before lunch)    Automatic Reconciliation, Ar   vitamin D (CHOLECALCIFEROL) 25 MCG (1000 UT) TABS tablet Take by mouth daily    Automatic Reconciliation, Ar   fluticasone (FLONASE) 50 MCG/ACT nasal spray Instill 2 sprays into each nostril once daily 21   Automatic Reconciliation, Ar   olopatadine (PATANOL) 0.1 % ophthalmic solution Apply 2 drops to eye 2 times daily as needed 10/29/21   Automatic Reconciliation, Ar       Current medications:    Current Facility-Administered Medications   Medication Dose Route Frequency Provider Last Rate Last Admin   • ceFAZolin (ANCEF) 2000 mg in sterile water 20 mL IV syringe  2,000 mg IntraVENous On Call Harrison Crain MD       • fentaNYL (SUBLIMAZE) injection 100 mcg  100 mcg IntraVENous Once PRN Teodoro Mera MD       • scopolamine (TRANSDERM-SCOP) transdermal patch 1 patch  1

## 2024-06-06 NOTE — PERIOP NOTE
Patient notified of change in surgery schedule. Requested that patient go ahead and leave for hospital. States she can do this and will leave in a few minutes.

## 2024-06-06 NOTE — ANESTHESIA POSTPROCEDURE EVALUATION
Department of Anesthesiology  Postprocedure Note    Patient: Triny Ruvalcaba  MRN: [id number]  YOB: 1945  Date of evaluation: 6/6/2024    Procedure Summary       Date: 06/06/24 Room / Location: [facility] MAIN OR 09 / [facility] MAIN OR    Anesthesia Start: 1023 Anesthesia Stop:     Procedure: OPEN VENTRAL HERNIA REPAIR W/ MESH (Abdomen) Diagnosis:       Ventral hernia without obstruction or gangrene      Morbid obesity (HCC)      (Ventral hernia without obstruction or gangrene [K43.9])      (Morbid obesity (HCC) [E66.01])    Providers: Harrison Crain MD Responsible Provider: Teodoro Mera MD    Anesthesia Type: general ASA Status: 2            Anesthesia Type: No value filed.    Eldon Phase I: Eldon Score: 8    Eldon Phase II:      Anesthesia Post Evaluation    Patient location during evaluation: PACU  Patient participation: complete - patient participated  Level of consciousness: awake and awake and alert  Airway patency: patent  Nausea & Vomiting: no nausea  Cardiovascular status: hemodynamically stable  Respiratory status: acceptable  Hydration status: euvolemic  Multimodal analgesia pain management approach  Pain management: adequate    No notable events documented.

## 2024-06-06 NOTE — PROGRESS NOTES
4 Eyes Skin Assessment     NAME:  Triny Ruvalcaba  YOB: 1945  MEDICAL RECORD NUMBER:  [id number]    The patient is being assessed for  Post-Op Surgical    I agree that at least one RN has performed a thorough Head to Toe Skin Assessment on the patient. ALL assessment sites listed below have been assessed.      Areas assessed by both nurses:    Head, Face, Ears, Shoulders, Back, Chest, Arms, Elbows, Hands, Sacrum. Buttock, Coccyx, Ischium, and Legs. Feet and Heels        Does the Patient have a Wound? No noted wound(s)       Braulio Prevention initiated by RN: No  Wound Care Orders initiated by RN: No    Pressure Injury (Stage 3,4, Unstageable, DTI, NWPT, and Complex wounds) if present, place Wound referral order by RN under : No    New Ostomies, if present place, Ostomy referral order under : No     Nurse 1 eSignature: Electronically signed by Veronika Payne RN on 6/6/24 at 3:30 PM EDT    **SHARE this note so that the co-signing nurse can place an eSignature**    Nurse 2 eSignature: Electronically signed by Darleen Ortega RN on 6/6/24 at 4:00 PM EDT

## 2024-06-06 NOTE — PROGRESS NOTES
visited patient in pre op, as requested.  Family was at bedside and supportive.  Patient was calm and coping.   provided pastoral presence, prayer, and empathetic listening.  Peace be with you,  Signed by  SOL [employer].   [phone]

## 2024-06-06 NOTE — ANESTHESIA PROCEDURE NOTES
Airway  Date/Time: 6/6/2024 10:35 AM  Urgency: elective    Airway not difficult    General Information and Staff    Patient location during procedure: OR  Resident/CRNA: Lyssa Doran APRN - CRNA  Performed: resident/CRNA   Performed by: Lyssa Doran APRN - CRNA  Authorized by: Teodoro Mera MD      Indications and Patient Condition  Indications for airway management: anesthesia  Spontaneous Ventilation: absent  Sedation level: deep  Preoxygenated: yes  Patient position: sniffing  MILS not maintained throughout  Mask difficulty assessment: vent by bag mask + OA or adjuvant +/- NMBA    Final Airway Details  Final airway type: endotracheal airway      Successful airway: ETT  Cuffed: yes   Successful intubation technique: direct laryngoscopy  Facilitating devices/methods: intubating stylet  Endotracheal tube insertion site: oral  Blade: Irineo  Blade size: #3  ETT size (mm): 7.0  Cormack-Lehane Classification: grade IIb - view of arytenoids or posterior of glottis only  Placement verified by: chest auscultation and capnometry   Measured from: lips  ETT to lips (cm): 22  Number of attempts at approach: 1  Ventilation between attempts: bag mask  Number of other approaches attempted: 0    no

## 2024-06-07 LAB
ANION GAP SERPL CALC-SCNC: 10 MMOL/L (ref 9–18)
BUN SERPL-MCNC: 40 MG/DL (ref 8–23)
CALCIUM SERPL-MCNC: 8.8 MG/DL (ref 8.8–10.2)
CHLORIDE SERPL-SCNC: 111 MMOL/L (ref 98–107)
CO2 SERPL-SCNC: 17 MMOL/L (ref 20–28)
CREAT SERPL-MCNC: 4.53 MG/DL (ref 0.6–1.1)
ERYTHROCYTE [DISTWIDTH] IN BLOOD BY AUTOMATED COUNT: 14.8 % (ref 11.9–14.6)
GLUCOSE SERPL-MCNC: 122 MG/DL (ref 70–99)
HCT VFR BLD AUTO: 31.5 % (ref 35.8–46.3)
HGB BLD-MCNC: 9.5 G/DL (ref 11.7–15.4)
MCH RBC QN AUTO: 27.4 PG (ref 26.1–32.9)
MCHC RBC AUTO-ENTMCNC: 30.2 G/DL (ref 31.4–35)
MCV RBC AUTO: 90.8 FL (ref 82–102)
NRBC # BLD: 0 K/UL (ref 0–0.2)
PLATELET # BLD AUTO: 208 K/UL (ref 150–450)
PMV BLD AUTO: 10.7 FL (ref 9.4–12.3)
POTASSIUM SERPL-SCNC: 5.9 MMOL/L (ref 3.5–5.1)
POTASSIUM SERPL-SCNC: 6.4 MMOL/L (ref 3.5–5.1)
RBC # BLD AUTO: 3.47 M/UL (ref 4.05–5.2)
SODIUM SERPL-SCNC: 137 MMOL/L (ref 136–145)
WBC # BLD AUTO: 15.3 K/UL (ref 4.3–11.1)

## 2024-06-07 PROCEDURE — 36415 COLL VENOUS BLD VENIPUNCTURE: CPT

## 2024-06-07 PROCEDURE — 2580000003 HC RX 258: Performed by: SURGERY

## 2024-06-07 PROCEDURE — 6370000000 HC RX 637 (ALT 250 FOR IP): Performed by: NURSE PRACTITIONER

## 2024-06-07 PROCEDURE — 94760 N-INVAS EAR/PLS OXIMETRY 1: CPT

## 2024-06-07 PROCEDURE — 84132 ASSAY OF SERUM POTASSIUM: CPT

## 2024-06-07 PROCEDURE — 97530 THERAPEUTIC ACTIVITIES: CPT

## 2024-06-07 PROCEDURE — 85027 COMPLETE CBC AUTOMATED: CPT

## 2024-06-07 PROCEDURE — 6360000002 HC RX W HCPCS: Performed by: SURGERY

## 2024-06-07 PROCEDURE — 80048 BASIC METABOLIC PNL TOTAL CA: CPT

## 2024-06-07 PROCEDURE — G0378 HOSPITAL OBSERVATION PER HR: HCPCS

## 2024-06-07 PROCEDURE — 97161 PT EVAL LOW COMPLEX 20 MIN: CPT

## 2024-06-07 PROCEDURE — 96372 THER/PROPH/DIAG INJ SC/IM: CPT

## 2024-06-07 PROCEDURE — 6370000000 HC RX 637 (ALT 250 FOR IP): Performed by: SURGERY

## 2024-06-07 PROCEDURE — 96375 TX/PRO/DX INJ NEW DRUG ADDON: CPT

## 2024-06-07 PROCEDURE — 96361 HYDRATE IV INFUSION ADD-ON: CPT

## 2024-06-07 RX ORDER — SODIUM CHLORIDE 9 MG/ML
INJECTION, SOLUTION INTRAVENOUS CONTINUOUS
Status: DISCONTINUED | OUTPATIENT
Start: 2024-06-07 | End: 2024-06-08

## 2024-06-07 RX ADMIN — SODIUM CHLORIDE, PRESERVATIVE FREE 10 ML: 5 INJECTION INTRAVENOUS at 20:43

## 2024-06-07 RX ADMIN — POTASSIUM CHLORIDE AND SODIUM CHLORIDE: 900; 150 INJECTION, SOLUTION INTRAVENOUS at 00:16

## 2024-06-07 RX ADMIN — SODIUM CHLORIDE, PRESERVATIVE FREE 10 ML: 5 INJECTION INTRAVENOUS at 08:34

## 2024-06-07 RX ADMIN — SODIUM ZIRCONIUM CYCLOSILICATE 5 G: 5 POWDER, FOR SUSPENSION ORAL at 14:51

## 2024-06-07 RX ADMIN — FLUTICASONE PROPIONATE 1 SPRAY: 50 SPRAY, METERED NASAL at 08:49

## 2024-06-07 RX ADMIN — KETOTIFEN FUMARATE 1 DROP: 0.25 SOLUTION/ DROPS OPHTHALMIC at 08:38

## 2024-06-07 RX ADMIN — SODIUM ZIRCONIUM CYCLOSILICATE 5 G: 5 POWDER, FOR SUSPENSION ORAL at 20:48

## 2024-06-07 RX ADMIN — OXYCODONE 10 MG: 5 TABLET ORAL at 08:30

## 2024-06-07 RX ADMIN — FAMOTIDINE 20 MG: 20 TABLET, FILM COATED ORAL at 08:33

## 2024-06-07 RX ADMIN — SODIUM CHLORIDE: 9 INJECTION, SOLUTION INTRAVENOUS at 08:57

## 2024-06-07 RX ADMIN — CETIRIZINE HYDROCHLORIDE 5 MG: 10 TABLET, FILM COATED ORAL at 08:32

## 2024-06-07 RX ADMIN — HEPARIN SODIUM 5000 UNITS: 5000 INJECTION INTRAVENOUS; SUBCUTANEOUS at 20:38

## 2024-06-07 RX ADMIN — AMLODIPINE BESYLATE 10 MG: 10 TABLET ORAL at 08:33

## 2024-06-07 RX ADMIN — OXYCODONE 10 MG: 5 TABLET ORAL at 17:42

## 2024-06-07 RX ADMIN — SODIUM ZIRCONIUM CYCLOSILICATE 5 G: 5 POWDER, FOR SUSPENSION ORAL at 11:06

## 2024-06-07 RX ADMIN — ONDANSETRON 4 MG: 2 INJECTION INTRAMUSCULAR; INTRAVENOUS at 14:33

## 2024-06-07 RX ADMIN — HEPARIN SODIUM 5000 UNITS: 5000 INJECTION INTRAVENOUS; SUBCUTANEOUS at 08:33

## 2024-06-07 ASSESSMENT — PAIN DESCRIPTION - PAIN TYPE
TYPE: SURGICAL PAIN
TYPE: SURGICAL PAIN
TYPE: ACUTE PAIN;SURGICAL PAIN

## 2024-06-07 ASSESSMENT — PAIN - FUNCTIONAL ASSESSMENT
PAIN_FUNCTIONAL_ASSESSMENT: ACTIVITIES ARE NOT PREVENTED

## 2024-06-07 ASSESSMENT — PAIN DESCRIPTION - LOCATION
LOCATION: ABDOMEN
LOCATION: ABDOMEN;INCISION
LOCATION: ABDOMEN

## 2024-06-07 ASSESSMENT — PAIN SCALES - GENERAL
PAINLEVEL_OUTOF10: 5
PAINLEVEL_OUTOF10: 5
PAINLEVEL_OUTOF10: 7
PAINLEVEL_OUTOF10: 7
PAINLEVEL_OUTOF10: 4
PAINLEVEL_OUTOF10: 4
PAINLEVEL_OUTOF10: 3

## 2024-06-07 ASSESSMENT — PAIN DESCRIPTION - DESCRIPTORS
DESCRIPTORS: ACHING;SORE
DESCRIPTORS: SORE;GNAWING
DESCRIPTORS: ACHING;SORE
DESCRIPTORS: SORE;NAGGING
DESCRIPTORS: SORE

## 2024-06-07 ASSESSMENT — PAIN DESCRIPTION - ORIENTATION
ORIENTATION: ANTERIOR;LOWER
ORIENTATION: ANTERIOR

## 2024-06-07 ASSESSMENT — PAIN DESCRIPTION - ONSET
ONSET: OTHER (COMMENT)
ONSET: OTHER (COMMENT)

## 2024-06-07 NOTE — ACP (ADVANCE CARE PLANNING)
Advance Care Planning   Healthcare Decision Maker:    Primary Decision Maker: Jeanette Echevarria - [phone]    Pt is a full code.    Click here to complete Healthcare Decision Makers including selection of the Healthcare Decision Maker Relationship (ie \"Primary\").

## 2024-06-07 NOTE — PROGRESS NOTES
Admit Date: 2024    POD 1 Day Post-Op    Procedure:  Procedure(s):  OPEN VENTRAL HERNIA REPAIR W/ MESH 24 Dr Crain    Subjective:     Patient has complaints of controlled post op pain 7-8/10  Has been up to camode last evening  Tolerated CLD      Objective:       Vitals:    24 1921 24 2340 24 0422 24 0804   BP: (!) 156/82 (!) 158/80 (!) 157/80 (!) 163/79   Pulse: 93 92 87 91   Resp:    Temp: 98.2 °F (36.8 °C) 98.2 °F (36.8 °C) 99 °F (37.2 °C) 99.7 °F (37.6 °C)   TempSrc: Oral Oral Oral    SpO2: 98% 98% 98% 99%   Weight:       Height:           Temp (24hrs), Av.3 °F (36.8 °C), Min:97.5 °F (36.4 °C), Max:99.7 °F (37.6 °C)    Intake/Output Summary (Last 24 hours) at 2024 0839  Last data filed at 2024 0833  Gross per 24 hour   Intake 2080.57 ml   Output 765 ml   Net 1315.57 ml   UOP  650ml/24   IVANIA 90ml overnight      Physical Exam:   General: alert, conversant  Abd: soft, incision intact with BINDER, no strike thru on dressing  Higgins: clear   Extr: SCD's    Labs:   Recent Results (from the past 24 hour(s))   POC Sodium and Potassium    Collection Time: 24  9:48 AM   Result Value Ref Range    POC Potassium 4.9 3.5 - 5.1 MMOL/L   Basic Metabolic Panel    Collection Time: 24  4:07 AM   Result Value Ref Range    Sodium 137 136 - 145 mmol/L    Potassium 6.4 (HH) 3.5 - 5.1 mmol/L    Chloride 111 (H) 98 - 107 mmol/L    CO2 17 (L) 20 - 28 mmol/L    Anion Gap 10 9 - 18 mmol/L    Glucose 122 (H) 70 - 99 mg/dL    BUN 40 (H) 8 - 23 MG/DL    Creatinine 4.53 (H) 0.60 - 1.10 MG/DL    Est, Glom Filt Rate 10 (L) >60 ml/min/1.73m2    Calcium 8.8 8.8 - 10.2 MG/DL   CBC    Collection Time: 24  4:07 AM   Result Value Ref Range    WBC 15.3 (H) 4.3 - 11.1 K/uL    RBC 3.47 (L) 4.05 - 5.2 M/uL    Hemoglobin 9.5 (L) 11.7 - 15.4 g/dL    Hematocrit 31.5 (L) 35.8 - 46.3 %    MCV 90.8 82 - 102 FL    MCH 27.4 26.1 - 32.9 PG    MCHC 30.2 (L) 31.4 - 35.0 g/dL    RDW 14.8 (H)

## 2024-06-07 NOTE — OP NOTE
[address]  [address]                            OPERATIVE REPORT      PATIENT NAME: YEYO IBARRA             : 1945  MED REC NO: [id number]                       ROOM: 221  [facility] NO: [id number]                       ADMIT DATE: 2024  PROVIDER: Harrison Crain MD    DATE OF SERVICE:  2024    PREOPERATIVE DIAGNOSES:  Ventral hernia containing bowel.    POSTOPERATIVE DIAGNOSES:  Two ventral hernias, 1 containing small bowel and the other containing a loop of transverse colon.  These 2 were contiguous with a bridge of fascia in between them, they were combined with a total diameter of 10 cm.    PROCEDURES PERFORMED:  Open ventral hernia repair with onlay mesh.  The diameter was 10 cm.    SURGEON:  Harrison Crain MD    ASSISTANT:  Hillary Smith SA, first assistant.    ANESTHESIA:  General endotracheal anesthesia with Dr. Mera plus local use at the end of the procedure.    ESTIMATED BLOOD LOSS:  Less than 50 mL.    SPECIMENS REMOVED:  None.    INTRAOPERATIVE FINDINGS:  Two hernias contiguous to one and another, 1 containing a loop of small bowel and 1 containing transverse colon.  The total diameter of one combined together was 10 cm.     COMPLICATIONS:  None.    IMPLANTS:  Piece of polypropylene mesh    INDICATIONS:  This is a 78-year-old female, who was referred to my office by Dr. Howie Wilder in the Emergency Department where she was seen on 2024.  She had a CT scan done when she was seen in the ER for abdominal pain, which showed anterior abdominal wall hernia containing bowel.  She was known to me as I had done a laparoscopic cholecystectomy on 2012.  Dr. Huddleston did an exploratory laparotomy, lysis of adhesions, reduction of the peritoneal mass and reduction of an internal hernia on 2016.  She said the ventral hernia she has had increased in size and she was seen in May and is painful.  I  recommended repair today.  I went through risks of bleeding, infection, anesthesia, injury to the small bowel, large bowel, any of the intra-abdominal organs, dense recurrence of the hernia.  She was agreeable, signed a consent form, was scheduled for today.    DESCRIPTION OF PROCEDURE:  The patient was seen in the preop area where the hernia was marked.  She was then transferred to room #9, [facility] Operating Room.  She was placed on the operating table in supine position where general endotracheal anesthesia was administered by Dr. Mera.  No Higgins catheter was inserted.  She did have sequential compression devices placed and used throughout the procedure.  She received 2 g of Ancef as prophylactic antibiotic coverage.  I did a time-out identifying the patient, surgeon, procedure, birth date of 1945.  When everyone agreed with the time-out, I began the procedure.  I made a midline incision.  The same incision that had been used by Dr. Huddleston some 8 years ago during his operation.  The hernia was at the umbilicus and there was a 2nd one found superior to the umbilicus.  We divided the skin with a 10 scalpel blade, divided the soft tissue with electrocautery down to the 2 hernias.  These were contiguous to one and another, 1 was found to contain a loop of transverse colon and the other 1 was found to contain a loop of small bowel.  I combined the two of these as there was a bridge of fascial tissue between them and it measured 10 cm.  I took down adhesions in the abdomen between the omentum and the anterior abdominal wall as well as the transverse colon and the anterior abdominal wall.  This was done with a combination of Metzenbaum scissors and electrocautery with no bowel was involved.  I then raised flaps circumferentially between the fascia and the overlying adipose tissue.  I closed the midline fascia with two #1 looped PDS sutures.  I did an onlay repair for the mesh using a large sheet of

## 2024-06-07 NOTE — PROGRESS NOTES
Writer communication with Kim Frommel. NP:  Surgeon: Dr. Crain s/p ventral hernia repair.   Reporting Critical Lab-Postassium 6.4. Per lab not hemolyzed. Pmhx CKD IV....last potassium 5/30=5.2 Please advise.  Awaiting response from NP at this time. No orders received at this time.     Per Heide NP, Is hospitalist following? Please repeat lab just to be sure it’s accurate.    Writer responded No to the hospitalist. Repeat lab ordered

## 2024-06-07 NOTE — PROGRESS NOTES
END OF SHIFT NOTE:    INTAKE/OUTPUT  06/06 0701 - 06/07 0700  In: 2073.6 [P.O.:240; I.V.:1833.6]  Out: 765 [Urine:650; Drains:90]  Voiding: Yes  Catheter: No  Drain:   Closed/Suction Drain Abdomen (Active)   Site Description Clean, dry & intact 06/07/24 0228   Dressing Status Clean, dry & intact 06/07/24 0228   Drainage Appearance Bright red 06/07/24 0228   Drain Status Compressed;To bulb suction 06/07/24 0228   Output (ml) 10 ml 06/07/24 0228       External Urinary Catheter (Active)   Site Assessment Clean,dry & intact 06/07/24 0228   Urine Color Yellow 06/07/24 0505   Urine Appearance Clear 06/07/24 0505   Output (mL) 200 mL 06/07/24 0422               Flatus: Patient does not have flatus present.    Stool:  occurrences.    Characteristics:           Stool Assessment  Last BM (including prior to admit): 06/04/24 (Per pt)    Emesis:  occurrences.    Characteristics:        VITAL SIGNS  Patient Vitals for the past 12 hrs:   Temp Pulse Resp BP SpO2   06/07/24 0422 99 °F (37.2 °C) 87 17 (!) 157/80 98 %   06/06/24 2340 98.2 °F (36.8 °C) 92 17 (!) 158/80 98 %   06/06/24 1921 98.2 °F (36.8 °C) 93 17 (!) 156/82 98 %       Pain Assessment  Pain Level: 7 (06/06/24 2137)  Pain Location: Abdomen  Patient's Stated Pain Goal: 0 - No pain    Ambulating  Yes    Shift report given to oncoming nurse at the bedside.    Jenna Bowling RN

## 2024-06-07 NOTE — FLOWSHEET NOTE
06/07/24 0531   Treatment Team Notification   Reason for Communication Abnormal vitals   Type of Critical Result Laboratory   Critical Lab Information Potassium 6.4 (not hemolyzed)   Person Result Received From Yenny   Critical Lab Result Type Electrolytes   Name of Team Member Notified Kim Frommell NP   Treatment Team Role Attending Provider   Method of Communication Secure Message   Response Waiting for response   Notification Time 6097

## 2024-06-07 NOTE — THERAPY EVALUATION
ACUTE PHYSICAL THERAPY GOALS:   (Developed with and agreed upon by patient and/or caregiver.)    (1.)Ms. Ruvalcaba will move from supine to sit and sit to supine  in bed with MODIFIED INDEPENDENCE within 7 treatment day(s).    (2.)Ms. Ruvalcaba will transfer from bed to chair and chair to bed with MODIFIED INDEPENDENCE using the least restrictive device within 7 treatment day(s).    (3.)Ms. Ruvalcaba will ambulate with MODIFIED INDEPENDENCE for 500 feet with the least restrictive device within 7 treatment day(s).  ________________________________________________________________________________________________    PHYSICAL THERAPY Initial Assessment and PM  (Link to Caseload Tracking: PT Visit Days : 1  Acknowledge Orders  Time In/Out  PT Charge Capture  Rehab Caseload Tracker    Triny Ruvalcaba is a 78 y.o. female   PRIMARY DIAGNOSIS: Ventral hernia without obstruction or gangrene  Ventral hernia without obstruction or gangrene [K43.9]  Morbid obesity (HCC) [E66.01]  Procedure(s) (LRB):  OPEN VENTRAL HERNIA REPAIR W/ MESH (N/A)  1 Day Post-Op  Reason for Referral: Generalized Muscle Weakness (M62.81)  Difficulty in walking, Not elsewhere classified (R26.2)  Other abnormalities of gait and mobility (R26.89)  Observation: Payor: [employer] MEDICARE / Plan: [employer] GOLD PLUS HMO / Product Type: *No Product type* /     ASSESSMENT:     REHAB RECOMMENDATIONS:   Recommendation to date pending progress:  Setting:  Home Health Therapy    Equipment:    None     ASSESSMENT:  Ms. Ruvalcaba is a 78 year old female who presents s/p open ventral hernia repair with mesh on 06/06/2024. This date pt performs mobility including bed mobility, transfers, and short distance gait in room with rolling walker.  Pt educated bed mobility via log roll, but pt difficulty with log roll technique and requested to elevate head of bed to assist with transfer.  Pt c/o nausea up sitting edge of bed, nsg provided IV nausea medication.  Limited gait to room due to  Assistance, CGA=Contact Guard Assistance,   Min=Minimal Assistance, Mod=Moderate Assistance, Max=Maximal Assistance, Total=Total Assistance, NT=Not Tested    PLAN:   FREQUENCY AND DURATION: 3 times/week for duration of hospital stay or until stated goals are met, whichever comes first.    THERAPY PROGNOSIS: Good    PROBLEM LIST:   (Skilled intervention is medically necessary to address:)  Decreased ADL/Functional Activities  Decreased Activity Tolerance  Decreased Balance  Decreased Gait Ability  Decreased Safety Awareness  Decreased Strength  Decreased Transfer Abilities  Increased Pain INTERVENTIONS PLANNED:   (Benefits and precautions of physical therapy have been discussed with the patient.)  Self Care Training  Therapeutic Activity  Therapeutic Exercise/HEP  Neuromuscular Re-education  Gait Training  Education       TREATMENT:   EVALUATION: LOW COMPLEXITY: (Untimed Charge)  The initial evaluation charge encompasses clinical chart review, objective assessment, interpretation of assessment, and skilled monitoring of the patient's response to treatment in order to develop a plan of care.     TREATMENT:   Therapeutic Activity (23 Minutes): Therapeutic activity included Rolling, Supine to Sit, Scooting, Transfer Training, Ambulation on level ground, Sitting balance , and Standing balance to improve functional Activity tolerance, Balance, Coordination, Mobility, Strength, and ROM.    TREATMENT GRID:  N/A    AFTER TREATMENT PRECAUTIONS: Bed/Chair Locked, Call light within reach, Chair, Needs within reach, RN at bedside, and Visitors at bedside    INTERDISCIPLINARY COLLABORATION:  RN/ PCT    EDUCATION: Education Given To: Patient  Education Provided: Role of Therapy;Plan of Care;Precautions;Transfer Training;Fall Prevention Strategies  Education Method: Verbal  Barriers to Learning: None  Education Outcome: Verbalized understanding;Demonstrated understanding    TIME IN/OUT:  Time In: 1418  Time Out: 1445  Minutes:

## 2024-06-07 NOTE — PROGRESS NOTES
Comprehensive Nutrition Assessment    Type and Reason for Visit: Initial, Positive Nutrition Screen  Malnutrition Screening Tool: Malnutrition Screen  Have you recently lost weight without trying?: 2 to 13 pounds (1 point)  Have you been eating poorly because of a decreased appetite?: Yes (1 point)  Malnutrition Screening Tool Score: 2    Nutrition Recommendations/Plan:   Meals and Snacks:  Diet: Add low K to diet order ; advance diet as medically appropriate  Nutrition Supplement Therapy:  Medical food supplement therapy:  Initiate Ensure Clear three times per day (this provides 240 kcal and 8 grams protein per bottle)     Malnutrition Assessment:  Malnutrition Status: At risk for malnutrition (Comment) (NPO/CLD)    No lenard wasting    Nutrition Assessment:  Nutrition History: Visited with patient in room. Reports that she has been dealing with waxing and waning discomfort in her stomach over the past year. Typically eats 1 meal per day with snacks. Will drink Ensure and Boost at home to supplement her intake; but reports it \"runs right through her\". Patient does not endorse any changes in appetite/intake pta.      Do You Have Any Cultural, Restoration, or Ethnic Food Preferences?: No   Weight History: 5/24/24: 254# (IM OV), 11/20/23: 253# (IM OV), 5/19/23: 265# (IM OV)  CBW: 259# (6/6 standing scale)   No significant weight changes.   Nutrition Background:       PMH: CKD5, degenerative disk disease, fibromyalgia, SBO, obesity, IBS, HTN, HLD. Admitted with ventral hernia with gangrene. Underwent open ventral hernia repair 6/6.   Nutrition Interval:  Patient with critical potassium this am (see below).   Addendum; MD managing: Switched from NS with 20 mEq Kcl to NS this AM, repeat K lab pending.     Patient current on CLD at this time. Tolerating well. Offered Ensure Clear to supplement PO intake; patient agreed to trial. Discussed high K levels with patient this AM. Pt agrees. Reviewed process of diet advancement

## 2024-06-07 NOTE — PROGRESS NOTES
Patient refusing Clear Ensure. Stated \"I've tried that but it tears up my stomach.\"    NP Notified in person.

## 2024-06-07 NOTE — CARE COORDINATION
Pt chart reviewed for discharge planning.  LMSW met with pt and her brother at bedside, verified demographic information/health insurance.  Pt has an adult son who resides with her. PCP was confirmed, last seen 5/24/2024.  Pt reports that she is normally independent with ADL's.  Pt is recovering from surgery yesterday.  LMSW will follow pt plan of care and assist with supportive care referrals pending pt clinical progress.  Please consult case management if specific needs arise.      06/07/24 4288   Service Assessment   Patient Orientation Alert and Oriented   Cognition Alert   History Provided By Patient;Child/Family;Medical Record   Primary Caregiver Self   Support Systems Children;Family Members   Patient's Healthcare Decision Maker is: Legal Next of Kin   PCP Verified by CM Yes   Last Visit to PCP Within last 3 months  (5/24/2024)   Prior Functional Level Independent in ADLs/IADLs   Current Functional Level Independent in ADLs/IADLs   Can patient return to prior living arrangement Yes   Ability to make needs known: Good   Family able to assist with home care needs: Yes   Would you like for me to discuss the discharge plan with any other family members/significant others, and if so, who? No   Financial Resources Medicare   Community Resources None   CM/SW Referral Other (see comment)  (none)   Social/Functional History   Lives With Son   Type of Home House   ADL Assistance Independent   Ambulation Assistance Independent   Active  Yes   Occupation Retired   Discharge Planning   Type of Residence House   Living Arrangements Children   Current Services Prior To Admission None   Potential Assistance Needed N/A   DME Ordered? No   Potential Assistance Purchasing Medications No   Type of Home Care Services None   Patient expects to be discharged to: House   Services At/After Discharge   Transition of Care Consult (CM Consult) Discharge Planning   Services At/After Discharge None  (unless orders recieved)   Mode  of Transport at Discharge Other (see comment)  (family)   Confirm Follow Up Transport Family   Condition of Participation: Discharge Planning   The Plan for Transition of Care is related to the following treatment goals: Pt will return home with family at her functional baseline.   The Patient and/Or Patient Representative agree with the Discharge Plan? Yes

## 2024-06-08 LAB
ANION GAP SERPL CALC-SCNC: 13 MMOL/L (ref 9–18)
BASOPHILS # BLD: 0 K/UL (ref 0–0.2)
BASOPHILS NFR BLD: 0 % (ref 0–2)
BUN SERPL-MCNC: 38 MG/DL (ref 8–23)
CALCIUM SERPL-MCNC: 8.9 MG/DL (ref 8.8–10.2)
CHLORIDE SERPL-SCNC: 111 MMOL/L (ref 98–107)
CO2 SERPL-SCNC: 15 MMOL/L (ref 20–28)
CREAT SERPL-MCNC: 4.42 MG/DL (ref 0.6–1.1)
DIFFERENTIAL METHOD BLD: ABNORMAL
EOSINOPHIL # BLD: 0.1 K/UL (ref 0–0.8)
EOSINOPHIL NFR BLD: 1 % (ref 0.5–7.8)
ERYTHROCYTE [DISTWIDTH] IN BLOOD BY AUTOMATED COUNT: 14.8 % (ref 11.9–14.6)
FERRITIN SERPL-MCNC: 310 NG/ML (ref 8–388)
GLUCOSE SERPL-MCNC: 118 MG/DL (ref 70–99)
HCT VFR BLD AUTO: 29.9 % (ref 35.8–46.3)
HGB BLD-MCNC: 8.8 G/DL (ref 11.7–15.4)
IMM GRANULOCYTES # BLD AUTO: 0.1 K/UL (ref 0–0.5)
IMM GRANULOCYTES NFR BLD AUTO: 1 % (ref 0–5)
IRON SATN MFR SERPL: 8 % (ref 20–50)
IRON SERPL-MCNC: 17 UG/DL (ref 35–100)
LYMPHOCYTES # BLD: 1.6 K/UL (ref 0.5–4.6)
LYMPHOCYTES NFR BLD: 12 % (ref 13–44)
MCH RBC QN AUTO: 26.7 PG (ref 26.1–32.9)
MCHC RBC AUTO-ENTMCNC: 29.4 G/DL (ref 31.4–35)
MCV RBC AUTO: 90.6 FL (ref 82–102)
MONOCYTES # BLD: 0.9 K/UL (ref 0.1–1.3)
MONOCYTES NFR BLD: 7 % (ref 4–12)
NEUTS SEG # BLD: 10.5 K/UL (ref 1.7–8.2)
NEUTS SEG NFR BLD: 79 % (ref 43–78)
NRBC # BLD: 0 K/UL (ref 0–0.2)
PLATELET # BLD AUTO: 180 K/UL (ref 150–450)
PMV BLD AUTO: 10.6 FL (ref 9.4–12.3)
POTASSIUM SERPL-SCNC: 5.3 MMOL/L (ref 3.5–5.1)
RBC # BLD AUTO: 3.3 M/UL (ref 4.05–5.2)
SODIUM SERPL-SCNC: 138 MMOL/L (ref 136–145)
TIBC SERPL-MCNC: 222 UG/DL (ref 240–450)
TRANSFERRIN SERPL-MCNC: 183 MG/DL (ref 200–360)
UIBC SERPL-MCNC: 205 UG/DL (ref 112–347)
WBC # BLD AUTO: 13.2 K/UL (ref 4.3–11.1)

## 2024-06-08 PROCEDURE — 2580000003 HC RX 258: Performed by: SURGERY

## 2024-06-08 PROCEDURE — 2500000003 HC RX 250 WO HCPCS: Performed by: SURGERY

## 2024-06-08 PROCEDURE — 6360000002 HC RX W HCPCS: Performed by: SURGERY

## 2024-06-08 PROCEDURE — 80048 BASIC METABOLIC PNL TOTAL CA: CPT

## 2024-06-08 PROCEDURE — 83540 ASSAY OF IRON: CPT

## 2024-06-08 PROCEDURE — 82728 ASSAY OF FERRITIN: CPT

## 2024-06-08 PROCEDURE — 85025 COMPLETE CBC W/AUTO DIFF WBC: CPT

## 2024-06-08 PROCEDURE — 84466 ASSAY OF TRANSFERRIN: CPT

## 2024-06-08 PROCEDURE — 6370000000 HC RX 637 (ALT 250 FOR IP)

## 2024-06-08 PROCEDURE — 6370000000 HC RX 637 (ALT 250 FOR IP): Performed by: SURGERY

## 2024-06-08 PROCEDURE — 96372 THER/PROPH/DIAG INJ SC/IM: CPT

## 2024-06-08 PROCEDURE — A4216 STERILE WATER/SALINE, 10 ML: HCPCS | Performed by: SURGERY

## 2024-06-08 PROCEDURE — 2580000003 HC RX 258

## 2024-06-08 PROCEDURE — 6370000000 HC RX 637 (ALT 250 FOR IP): Performed by: NURSE PRACTITIONER

## 2024-06-08 PROCEDURE — 96361 HYDRATE IV INFUSION ADD-ON: CPT

## 2024-06-08 PROCEDURE — 36415 COLL VENOUS BLD VENIPUNCTURE: CPT

## 2024-06-08 PROCEDURE — 96375 TX/PRO/DX INJ NEW DRUG ADDON: CPT

## 2024-06-08 PROCEDURE — G0378 HOSPITAL OBSERVATION PER HR: HCPCS

## 2024-06-08 RX ORDER — POLYETHYLENE GLYCOL 3350 17 G/17G
17 POWDER, FOR SOLUTION ORAL DAILY
Status: DISCONTINUED | OUTPATIENT
Start: 2024-06-08 | End: 2024-06-16 | Stop reason: SDUPTHER

## 2024-06-08 RX ORDER — BISACODYL 10 MG
10 SUPPOSITORY, RECTAL RECTAL DAILY PRN
Status: DISCONTINUED | OUTPATIENT
Start: 2024-06-08 | End: 2024-06-26 | Stop reason: HOSPADM

## 2024-06-08 RX ORDER — SODIUM BICARBONATE 650 MG/1
1300 TABLET ORAL 3 TIMES DAILY
Status: DISCONTINUED | OUTPATIENT
Start: 2024-06-08 | End: 2024-06-09

## 2024-06-08 RX ORDER — SODIUM CHLORIDE, SODIUM LACTATE, POTASSIUM CHLORIDE, CALCIUM CHLORIDE 600; 310; 30; 20 MG/100ML; MG/100ML; MG/100ML; MG/100ML
INJECTION, SOLUTION INTRAVENOUS CONTINUOUS
Status: ACTIVE | OUTPATIENT
Start: 2024-06-08 | End: 2024-06-12

## 2024-06-08 RX ADMIN — SODIUM BICARBONATE 650 MG TABLET 1300 MG: at 21:04

## 2024-06-08 RX ADMIN — AMLODIPINE BESYLATE 10 MG: 10 TABLET ORAL at 09:04

## 2024-06-08 RX ADMIN — HYDRALAZINE HYDROCHLORIDE 10 MG: 20 INJECTION INTRAMUSCULAR; INTRAVENOUS at 22:32

## 2024-06-08 RX ADMIN — KETOTIFEN FUMARATE 1 DROP: 0.25 SOLUTION/ DROPS OPHTHALMIC at 21:05

## 2024-06-08 RX ADMIN — SODIUM BICARBONATE 650 MG TABLET 1300 MG: at 15:53

## 2024-06-08 RX ADMIN — FAMOTIDINE 20 MG: 20 TABLET, FILM COATED ORAL at 09:02

## 2024-06-08 RX ADMIN — SODIUM CHLORIDE: 9 INJECTION, SOLUTION INTRAVENOUS at 05:16

## 2024-06-08 RX ADMIN — CETIRIZINE HYDROCHLORIDE 5 MG: 10 TABLET, FILM COATED ORAL at 09:04

## 2024-06-08 RX ADMIN — SODIUM CHLORIDE, PRESERVATIVE FREE 10 ML: 5 INJECTION INTRAVENOUS at 10:56

## 2024-06-08 RX ADMIN — HEPARIN SODIUM 5000 UNITS: 5000 INJECTION INTRAVENOUS; SUBCUTANEOUS at 09:02

## 2024-06-08 RX ADMIN — OXYCODONE 10 MG: 5 TABLET ORAL at 21:04

## 2024-06-08 RX ADMIN — FLUTICASONE PROPIONATE 1 SPRAY: 50 SPRAY, METERED NASAL at 09:03

## 2024-06-08 RX ADMIN — KETOTIFEN FUMARATE 1 DROP: 0.25 SOLUTION/ DROPS OPHTHALMIC at 09:03

## 2024-06-08 RX ADMIN — SODIUM CHLORIDE, PRESERVATIVE FREE 10 ML: 5 INJECTION INTRAVENOUS at 21:09

## 2024-06-08 RX ADMIN — HEPARIN SODIUM 5000 UNITS: 5000 INJECTION INTRAVENOUS; SUBCUTANEOUS at 21:04

## 2024-06-08 RX ADMIN — POLYETHYLENE GLYCOL 3350 17 G: 17 POWDER, FOR SOLUTION ORAL at 10:54

## 2024-06-08 RX ADMIN — SODIUM CHLORIDE, POTASSIUM CHLORIDE, SODIUM LACTATE AND CALCIUM CHLORIDE: 600; 310; 30; 20 INJECTION, SOLUTION INTRAVENOUS at 15:52

## 2024-06-08 ASSESSMENT — PAIN DESCRIPTION - DESCRIPTORS
DESCRIPTORS: ACHING
DESCRIPTORS: ACHING

## 2024-06-08 ASSESSMENT — PAIN DESCRIPTION - LOCATION
LOCATION: LEG
LOCATION: LEG

## 2024-06-08 ASSESSMENT — PAIN - FUNCTIONAL ASSESSMENT: PAIN_FUNCTIONAL_ASSESSMENT: ACTIVITIES ARE NOT PREVENTED

## 2024-06-08 ASSESSMENT — PAIN DESCRIPTION - ORIENTATION
ORIENTATION: RIGHT
ORIENTATION: RIGHT

## 2024-06-08 ASSESSMENT — PAIN SCALES - GENERAL
PAINLEVEL_OUTOF10: 8
PAINLEVEL_OUTOF10: 8

## 2024-06-08 ASSESSMENT — PAIN DESCRIPTION - PAIN TYPE: TYPE: ACUTE PAIN

## 2024-06-08 NOTE — PROGRESS NOTES
Writer communication with Kim Frommel NP,     Patient of Dr. Crain s/p ventral hernia repair. Pt reports burning with urination. Voiced concern for UTI d/t taking abx therapy OP for UTI approx 2 wks ago. Reports evaluated in ED for UTI. Would you like to address pt concern?? Denies any other sx. Please advise.     Per Kim Frommel [id number]-In am when I’m in the building

## 2024-06-08 NOTE — PROGRESS NOTES
END OF SHIFT NOTE:    INTAKE/OUTPUT  06/07 0701 - 06/08 0700  In: 2444.2 [P.O.:1050; I.V.:1394.2]  Out: 3100 [Urine:3050; Drains:50]  Voiding: Yes  Catheter: No  Drain:   Closed/Suction Drain Abdomen (Active)   Site Description Clean, dry & intact 06/08/24 0517   Dressing Status Clean, dry & intact 06/08/24 0517   Drainage Appearance Bright red 06/08/24 0517   Drain Status Compressed;To bulb suction 06/08/24 0517   Output (ml) 10 ml 06/08/24 0517       External Urinary Catheter (Active)   Site Assessment Clean,dry & intact 06/07/24 2015   Placement Replaced 06/07/24 1135   Securement Method Securing device (Describe) 06/07/24 2015   Catheter Care Catheter/Wick replaced 06/07/24 2015   Perineal Care Yes 06/07/24 2015   Suction 40 mmgHg continuous 06/07/24 2015   Urine Color Yellow 06/07/24 2015   Urine Appearance Clear 06/07/24 2015   Output (mL) 550 mL 06/08/24 0318               Flatus: Patient does not have flatus present.    Stool:  occurrences.    Characteristics:           Stool Assessment  Last BM (including prior to admit): 06/04/24    Emesis:  occurrences.    Characteristics:        VITAL SIGNS  Patient Vitals for the past 12 hrs:   Temp Pulse Resp BP SpO2   06/08/24 0318 99.5 °F (37.5 °C) 98 17 (!) 140/69 94 %   06/07/24 2318 99.5 °F (37.5 °C) 97 17 (!) 159/75 94 %   06/07/24 2110 -- (!) 120 15 -- 92 %   06/07/24 1936 99.1 °F (37.3 °C) 97 17 (!) 173/74 93 %       Pain Assessment  Pain Level: 5 (06/07/24 2015)  Pain Location: Abdomen  Patient's Stated Pain Goal: 5    Ambulating  Yes    Shift report given to oncoming nurse at the bedside.    Jenna Bowling RN

## 2024-06-08 NOTE — PROGRESS NOTES
General Surgery Progress Note    6/8/2024    Admit Date: 6/6/2024    Subjective:   Surgery POD #2  Pain better controlled today. She sat in the chair for 3 hours yesterday.     Objective:     BP (!) 156/68   Pulse 98   Temp 99 °F (37.2 °C) (Oral)   Resp 19   Ht 1.651 m (5' 5\")   Wt 117.3 kg (258 lb 9.6 oz)   SpO2 93%   BMI 43.03 kg/m²       Intake/Output Summary (Last 24 hours) at 6/8/2024 0955  Last data filed at 6/8/2024 0718  Gross per 24 hour   Intake 2156.35 ml   Output 3090 ml   Net -933.65 ml        EXAM:  ABD soft, no recurrent ventral hernias, active BS'S. No flatus yet.        Data Review    Recent Results (from the past 24 hour(s))   CBC with Auto Differential    Collection Time: 06/08/24  4:42 AM   Result Value Ref Range    WBC 13.2 (H) 4.3 - 11.1 K/uL    RBC 3.30 (L) 4.05 - 5.2 M/uL    Hemoglobin 8.8 (L) 11.7 - 15.4 g/dL    Hematocrit 29.9 (L) 35.8 - 46.3 %    MCV 90.6 82 - 102 FL    MCH 26.7 26.1 - 32.9 PG    MCHC 29.4 (L) 31.4 - 35.0 g/dL    RDW 14.8 (H) 11.9 - 14.6 %    Platelets 180 150 - 450 K/uL    MPV 10.6 9.4 - 12.3 FL    nRBC 0.00 0.0 - 0.2 K/uL    Differential Type AUTOMATED      Neutrophils % 79 (H) 43 - 78 %    Lymphocytes % 12 (L) 13 - 44 %    Monocytes % 7 4.0 - 12.0 %    Eosinophils % 1 0.5 - 7.8 %    Basophils % 0 0.0 - 2.0 %    Immature Granulocytes % 1 0.0 - 5.0 %    Neutrophils Absolute 10.5 (H) 1.7 - 8.2 K/UL    Lymphocytes Absolute 1.6 0.5 - 4.6 K/UL    Monocytes Absolute 0.9 0.1 - 1.3 K/UL    Eosinophils Absolute 0.1 0.0 - 0.8 K/UL    Basophils Absolute 0.0 0.0 - 0.2 K/UL    Immature Granulocytes Absolute 0.1 0.0 - 0.5 K/UL   Basic Metabolic Panel    Collection Time: 06/08/24  4:42 AM   Result Value Ref Range    Sodium 138 136 - 145 mmol/L    Potassium 5.3 (H) 3.5 - 5.1 mmol/L    Chloride 111 (H) 98 - 107 mmol/L    CO2 15 (L) 20 - 28 mmol/L    Anion Gap 13 9 - 18 mmol/L    Glucose 118 (H) 70 - 99 mg/dL    BUN 38 (H) 8 - 23 MG/DL    Creatinine 4.42 (H) 0.60 - 1.10 MG/DL

## 2024-06-08 NOTE — CONSULTS
purpura, no ecchymoses  Access:      Data Review:   Recent Labs     06/07/24  0407 06/08/24  0442   WBC 15.3* 13.2*   HGB 9.5* 8.8*   HCT 31.5* 29.9*    180     Recent Labs     06/07/24  0407 06/07/24  0915 06/08/24  0442     --  138   K 6.4* 5.9* 5.3*   *  --  111*   CO2 17*  --  15*   BUN 40*  --  38*   CREATININE 4.53*  --  4.42*     No results for input(s): \"PH\", \"PCO2\", \"PO2\" in the last 72 hours.    Problem List:     Patient Active Problem List    Diagnosis Date Noted    Chronic kidney disease, stage 5 ([facility]) 01/17/2023    Chronic kidney disease (CKD), stage V ([facility]) [985656] 01/17/2023    Ventral hernia without obstruction or gangrene 05/28/2024    Morbid obesity (HCC) 05/28/2024    Metabolic acidosis 05/01/2024    Iron deficiency anemia 12/08/2022    Hypercholesterolemia 09/28/2022    IFG (impaired fasting glucose) 05/20/2022    Stage 4 chronic kidney disease (HCC) 04/05/2022    Allergic conjunctivitis of both eyes 10/30/2021    Allergic rhinitis due to pollen 04/28/2021    Abdominal pain 03/13/2021    Acute UTI 03/13/2021    Difficult or painful urination 03/13/2021    Obesity, morbid ([facility]) 08/29/2018    DDD (degenerative disc disease), lumbar 07/13/2017    Irritable bowel syndrome with diarrhea 11/30/2016    Chronic low back pain with bilateral sciatica 10/09/2016    History of small bowel obstruction 05/30/2016    Primary fibromyalgia 04/04/2016    Obesity 04/14/2015    Allergy 04/14/2015    Arthritis of both knees 04/14/2015    Benign hypertension with chronic kidney disease 04/14/2015    Hypokalemia        Plan:     1. CKD stage V - pt currently within her renal function baseline. At office visit in May, pt has refused dialysis. Plans for medical management for her lab derangements  -pt currently within her baseline, continue to trend. I spent some time discussing dialysis (She is willing but only when she needs it). Discussed importance of planning from a mortality and morbidity

## 2024-06-09 ENCOUNTER — APPOINTMENT (OUTPATIENT)
Dept: GENERAL RADIOLOGY | Age: 79
DRG: 353 | End: 2024-06-09
Attending: SURGERY
Payer: MEDICARE

## 2024-06-09 LAB
ANION GAP SERPL CALC-SCNC: 13 MMOL/L (ref 9–18)
BASOPHILS # BLD: 0 K/UL (ref 0–0.2)
BASOPHILS NFR BLD: 0 % (ref 0–2)
BUN SERPL-MCNC: 34 MG/DL (ref 8–23)
CALCIUM SERPL-MCNC: 9.1 MG/DL (ref 8.8–10.2)
CHLORIDE SERPL-SCNC: 109 MMOL/L (ref 98–107)
CO2 SERPL-SCNC: 17 MMOL/L (ref 20–28)
CREAT SERPL-MCNC: 4.34 MG/DL (ref 0.6–1.1)
DIFFERENTIAL METHOD BLD: ABNORMAL
EOSINOPHIL # BLD: 0.1 K/UL (ref 0–0.8)
EOSINOPHIL NFR BLD: 1 % (ref 0.5–7.8)
ERYTHROCYTE [DISTWIDTH] IN BLOOD BY AUTOMATED COUNT: 14.6 % (ref 11.9–14.6)
GLUCOSE SERPL-MCNC: 130 MG/DL (ref 70–99)
HCT VFR BLD AUTO: 32 % (ref 35.8–46.3)
HGB BLD-MCNC: 9.8 G/DL (ref 11.7–15.4)
IMM GRANULOCYTES # BLD AUTO: 0.2 K/UL (ref 0–0.5)
IMM GRANULOCYTES NFR BLD AUTO: 1 % (ref 0–5)
LYMPHOCYTES # BLD: 1.2 K/UL (ref 0.5–4.6)
LYMPHOCYTES NFR BLD: 9 % (ref 13–44)
MCH RBC QN AUTO: 27.5 PG (ref 26.1–32.9)
MCHC RBC AUTO-ENTMCNC: 30.6 G/DL (ref 31.4–35)
MCV RBC AUTO: 89.9 FL (ref 82–102)
MONOCYTES # BLD: 0.9 K/UL (ref 0.1–1.3)
MONOCYTES NFR BLD: 6 % (ref 4–12)
NEUTS SEG # BLD: 12.1 K/UL (ref 1.7–8.2)
NEUTS SEG NFR BLD: 83 % (ref 43–78)
NRBC # BLD: 0 K/UL (ref 0–0.2)
PLATELET # BLD AUTO: 200 K/UL (ref 150–450)
PMV BLD AUTO: 10.9 FL (ref 9.4–12.3)
POTASSIUM SERPL-SCNC: 5.1 MMOL/L (ref 3.5–5.1)
RBC # BLD AUTO: 3.56 M/UL (ref 4.05–5.2)
SODIUM SERPL-SCNC: 139 MMOL/L (ref 136–145)
WBC # BLD AUTO: 14.4 K/UL (ref 4.3–11.1)

## 2024-06-09 PROCEDURE — 6370000000 HC RX 637 (ALT 250 FOR IP): Performed by: SURGERY

## 2024-06-09 PROCEDURE — 2500000003 HC RX 250 WO HCPCS: Performed by: SURGERY

## 2024-06-09 PROCEDURE — G0378 HOSPITAL OBSERVATION PER HR: HCPCS

## 2024-06-09 PROCEDURE — 96365 THER/PROPH/DIAG IV INF INIT: CPT

## 2024-06-09 PROCEDURE — 74018 RADEX ABDOMEN 1 VIEW: CPT

## 2024-06-09 PROCEDURE — 6360000002 HC RX W HCPCS: Performed by: NURSE PRACTITIONER

## 2024-06-09 PROCEDURE — 96376 TX/PRO/DX INJ SAME DRUG ADON: CPT

## 2024-06-09 PROCEDURE — 74019 RADEX ABDOMEN 2 VIEWS: CPT

## 2024-06-09 PROCEDURE — 2580000003 HC RX 258: Performed by: SURGERY

## 2024-06-09 PROCEDURE — 96367 TX/PROPH/DG ADDL SEQ IV INF: CPT

## 2024-06-09 PROCEDURE — 6370000000 HC RX 637 (ALT 250 FOR IP): Performed by: NURSE PRACTITIONER

## 2024-06-09 PROCEDURE — 6360000002 HC RX W HCPCS: Performed by: INTERNAL MEDICINE

## 2024-06-09 PROCEDURE — 85025 COMPLETE CBC W/AUTO DIFF WBC: CPT

## 2024-06-09 PROCEDURE — 96366 THER/PROPH/DIAG IV INF ADDON: CPT

## 2024-06-09 PROCEDURE — 2580000003 HC RX 258: Performed by: NURSE PRACTITIONER

## 2024-06-09 PROCEDURE — 6370000000 HC RX 637 (ALT 250 FOR IP): Performed by: INTERNAL MEDICINE

## 2024-06-09 PROCEDURE — 96361 HYDRATE IV INFUSION ADD-ON: CPT

## 2024-06-09 PROCEDURE — 2580000003 HC RX 258: Performed by: INTERNAL MEDICINE

## 2024-06-09 PROCEDURE — 80048 BASIC METABOLIC PNL TOTAL CA: CPT

## 2024-06-09 PROCEDURE — 36415 COLL VENOUS BLD VENIPUNCTURE: CPT

## 2024-06-09 PROCEDURE — 96375 TX/PRO/DX INJ NEW DRUG ADDON: CPT

## 2024-06-09 PROCEDURE — 6360000002 HC RX W HCPCS: Performed by: SURGERY

## 2024-06-09 RX ORDER — METOCLOPRAMIDE 10 MG/1
5 TABLET ORAL
Status: DISCONTINUED | OUTPATIENT
Start: 2024-06-09 | End: 2024-06-10

## 2024-06-09 RX ORDER — SODIUM BICARBONATE 650 MG/1
1300 TABLET ORAL 2 TIMES DAILY
Status: DISCONTINUED | OUTPATIENT
Start: 2024-06-09 | End: 2024-06-24

## 2024-06-09 RX ADMIN — FLUTICASONE PROPIONATE 1 SPRAY: 50 SPRAY, METERED NASAL at 09:46

## 2024-06-09 RX ADMIN — HEPARIN SODIUM 5000 UNITS: 5000 INJECTION INTRAVENOUS; SUBCUTANEOUS at 09:47

## 2024-06-09 RX ADMIN — SODIUM BICARBONATE 650 MG TABLET 1300 MG: at 09:48

## 2024-06-09 RX ADMIN — KETOTIFEN FUMARATE 1 DROP: 0.25 SOLUTION/ DROPS OPHTHALMIC at 09:46

## 2024-06-09 RX ADMIN — FAMOTIDINE 20 MG: 20 TABLET, FILM COATED ORAL at 09:49

## 2024-06-09 RX ADMIN — CETIRIZINE HYDROCHLORIDE 5 MG: 10 TABLET, FILM COATED ORAL at 09:45

## 2024-06-09 RX ADMIN — SODIUM BICARBONATE 650 MG TABLET 1300 MG: at 21:06

## 2024-06-09 RX ADMIN — SODIUM CHLORIDE, POTASSIUM CHLORIDE, SODIUM LACTATE AND CALCIUM CHLORIDE: 600; 310; 30; 20 INJECTION, SOLUTION INTRAVENOUS at 20:57

## 2024-06-09 RX ADMIN — HYDRALAZINE HYDROCHLORIDE 10 MG: 20 INJECTION INTRAMUSCULAR; INTRAVENOUS at 18:06

## 2024-06-09 RX ADMIN — OXYCODONE 10 MG: 5 TABLET ORAL at 05:44

## 2024-06-09 RX ADMIN — SODIUM CHLORIDE, PRESERVATIVE FREE 10 ML: 5 INJECTION INTRAVENOUS at 09:49

## 2024-06-09 RX ADMIN — HYDROMORPHONE HYDROCHLORIDE 1 MG: 1 INJECTION, SOLUTION INTRAMUSCULAR; INTRAVENOUS; SUBCUTANEOUS at 21:06

## 2024-06-09 RX ADMIN — PIPERACILLIN AND TAZOBACTAM 4500 MG: 4; .5 INJECTION, POWDER, FOR SOLUTION INTRAVENOUS at 10:47

## 2024-06-09 RX ADMIN — METOCLOPRAMIDE 5 MG: 10 TABLET ORAL at 21:06

## 2024-06-09 RX ADMIN — METOCLOPRAMIDE 5 MG: 10 TABLET ORAL at 16:38

## 2024-06-09 RX ADMIN — SODIUM CHLORIDE 125 MG: 9 INJECTION, SOLUTION INTRAVENOUS at 09:42

## 2024-06-09 RX ADMIN — ONDANSETRON 4 MG: 2 INJECTION INTRAMUSCULAR; INTRAVENOUS at 05:44

## 2024-06-09 RX ADMIN — SODIUM CHLORIDE, POTASSIUM CHLORIDE, SODIUM LACTATE AND CALCIUM CHLORIDE: 600; 310; 30; 20 INJECTION, SOLUTION INTRAVENOUS at 09:33

## 2024-06-09 RX ADMIN — PIPERACILLIN AND TAZOBACTAM 3375 MG: 3; .375 INJECTION, POWDER, LYOPHILIZED, FOR SOLUTION INTRAVENOUS at 16:43

## 2024-06-09 RX ADMIN — METOCLOPRAMIDE 5 MG: 10 TABLET ORAL at 09:47

## 2024-06-09 RX ADMIN — AMLODIPINE BESYLATE 10 MG: 10 TABLET ORAL at 09:45

## 2024-06-09 RX ADMIN — POLYETHYLENE GLYCOL 3350 17 G: 17 POWDER, FOR SOLUTION ORAL at 09:47

## 2024-06-09 RX ADMIN — HEPARIN SODIUM 5000 UNITS: 5000 INJECTION INTRAVENOUS; SUBCUTANEOUS at 21:06

## 2024-06-09 ASSESSMENT — PAIN DESCRIPTION - LOCATION
LOCATION: ABDOMEN;KNEE;LEG
LOCATION: ABDOMEN;KNEE;LEG
LOCATION: LEG;ABDOMEN

## 2024-06-09 ASSESSMENT — PAIN SCALES - GENERAL
PAINLEVEL_OUTOF10: 8
PAINLEVEL_OUTOF10: 6
PAINLEVEL_OUTOF10: 7

## 2024-06-09 ASSESSMENT — PAIN DESCRIPTION - DESCRIPTORS
DESCRIPTORS: ACHING;TIGHTNESS
DESCRIPTORS: TIGHTNESS

## 2024-06-09 ASSESSMENT — PAIN DESCRIPTION - PAIN TYPE: TYPE: SURGICAL PAIN;CHRONIC PAIN

## 2024-06-09 ASSESSMENT — PAIN - FUNCTIONAL ASSESSMENT: PAIN_FUNCTIONAL_ASSESSMENT: PREVENTS OR INTERFERES SOME ACTIVE ACTIVITIES AND ADLS

## 2024-06-09 ASSESSMENT — PAIN DESCRIPTION - ORIENTATION
ORIENTATION: ANTERIOR
ORIENTATION: RIGHT;ANTERIOR

## 2024-06-09 NOTE — PROGRESS NOTES
General Surgery Progress Note    6/9/2024    Admit Date: 6/6/2024    Subjective:   Surgery POD #3  The patient vomited last night. No flatus or BM yet. Likely has an ileus.     Objective:     BP (!) 185/79   Pulse 100   Temp 98.4 °F (36.9 °C) (Oral)   Resp 20   Ht 1.651 m (5' 5\")   Wt 117.3 kg (258 lb 9.6 oz)   SpO2 94%   BMI 43.03 kg/m²       Intake/Output Summary (Last 24 hours) at 6/9/2024 0939  Last data filed at 6/9/2024 0906  Gross per 24 hour   Intake 2270.19 ml   Output 3210 ml   Net -939.81 ml        EXAM:  ABD soft, obese, active BS'S. Wound intact. No recurrent ventral hernia. IVANIA drain with old blood.       Data Review    Recent Results (from the past 24 hour(s))   CBC with Auto Differential    Collection Time: 06/09/24  3:46 AM   Result Value Ref Range    WBC 14.4 (H) 4.3 - 11.1 K/uL    RBC 3.56 (L) 4.05 - 5.2 M/uL    Hemoglobin 9.8 (L) 11.7 - 15.4 g/dL    Hematocrit 32.0 (L) 35.8 - 46.3 %    MCV 89.9 82 - 102 FL    MCH 27.5 26.1 - 32.9 PG    MCHC 30.6 (L) 31.4 - 35.0 g/dL    RDW 14.6 11.9 - 14.6 %    Platelets 200 150 - 450 K/uL    MPV 10.9 9.4 - 12.3 FL    nRBC 0.00 0.0 - 0.2 K/uL    Differential Type AUTOMATED      Neutrophils % 83 (H) 43 - 78 %    Lymphocytes % 9 (L) 13 - 44 %    Monocytes % 6 4.0 - 12.0 %    Eosinophils % 1 0.5 - 7.8 %    Basophils % 0 0.0 - 2.0 %    Immature Granulocytes % 1 0.0 - 5.0 %    Neutrophils Absolute 12.1 (H) 1.7 - 8.2 K/UL    Lymphocytes Absolute 1.2 0.5 - 4.6 K/UL    Monocytes Absolute 0.9 0.1 - 1.3 K/UL    Eosinophils Absolute 0.1 0.0 - 0.8 K/UL    Basophils Absolute 0.0 0.0 - 0.2 K/UL    Immature Granulocytes Absolute 0.2 0.0 - 0.5 K/UL   Basic Metabolic Panel    Collection Time: 06/09/24  3:46 AM   Result Value Ref Range    Sodium 139 136 - 145 mmol/L    Potassium 5.1 3.5 - 5.1 mmol/L    Chloride 109 (H) 98 - 107 mmol/L    CO2 17 (L) 20 - 28 mmol/L    Anion Gap 13 9 - 18 mmol/L    Glucose 130 (H) 70 - 99 mg/dL    BUN 34 (H) 8 - 23 MG/DL    Creatinine 4.34 (H)

## 2024-06-09 NOTE — PROGRESS NOTES
Writer communication with Kim Frommel NP,    Patient of Dr. Crain s/p ventral hernia repair w/ mesh 6/6. Pt vomited 400ml brown emesis. Tx with zofran. Reports \"fullness\" resolved. Noted distention on intial assessment. No gas or BM postop. Let me know if you would like xray now or defer to morning rounds.     Per Heide WOO,  Nothing now will Eval soon.

## 2024-06-09 NOTE — PROGRESS NOTES
END OF SHIFT NOTE:    INTAKE/OUTPUT  06/08 0701 - 06/09 0700  In: 2150.2 [P.O.:950; I.V.:1200.2]  Out: 3810 [Urine:3400; Drains:10]  Voiding: Yes  Catheter: No  Drain:   Closed/Suction Drain Abdomen (Active)   Site Description Clean, dry & intact 06/08/24 1930   Dressing Status Clean, dry & intact 06/08/24 1930   Drainage Appearance Bright red 06/08/24 1930   Drain Status Compressed;To bulb suction 06/08/24 1930   Output (ml) 0 ml 06/08/24 1930       External Urinary Catheter (Active)   Site Assessment Clean,dry & intact 06/09/24 0546   Placement Replaced 06/09/24 0546   Securement Method Securing device (Describe) 06/07/24 2015   Catheter Care Catheter/Wick replaced 06/09/24 0546   Perineal Care Yes 06/09/24 0546   Suction 40 mmgHg continuous 06/07/24 2015   Urine Color Bre 06/09/24 0546   Urine Appearance Clear 06/09/24 0546   Output (mL) 300 mL 06/09/24 0546               Flatus: Patient does not have flatus present.    Stool: 0 occurrences.    Characteristics:           Stool Assessment  Last BM (including prior to admit): 06/04/24    Emesis: 1 occurrences.    Characteristics:   Emesis Appearance: Brown    VITAL SIGNS  Patient Vitals for the past 12 hrs:   Temp Pulse Resp BP SpO2   06/09/24 0335 98.6 °F (37 °C) (!) 101 18 (!) 168/79 92 %   06/08/24 2342 -- (!) 104 -- (!) 171/70 93 %   06/08/24 2229 99.7 °F (37.6 °C) (!) 104 18 (!) 212/75 91 %   06/08/24 2045 -- -- -- -- (!) 85 %   06/08/24 1944 99.9 °F (37.7 °C) 100 18 (!) 185/86 91 %       Pain Assessment  Pain Level: 7 (06/09/24 0544)  Pain Location: Leg, Abdomen  Patient's Stated Pain Goal: 0 - No pain    Ambulating  No    Shift report given to oncoming nurse at the bedside.    Jenna Bowling RN

## 2024-06-09 NOTE — PROGRESS NOTES
Subjective:   Daily Progress Note: 6/9/2024 8:45 AM    Some nausea this morning    Comfortable  No CP No SOB  + Abd pain  MS -      Current Facility-Administered Medications   Medication Dose Route Frequency    metoclopramide (REGLAN) tablet 5 mg  5 mg Oral 4x Daily AC & HS    piperacillin-tazobactam (ZOSYN) 4,500 mg in sodium chloride 0.9 % 100 mL IVPB (mini-bag)  4,500 mg IntraVENous Once    piperacillin-tazobactam (ZOSYN) 3,375 mg in sodium chloride 0.9 % 50 mL IVPB (mini-bag)  3,375 mg IntraVENous Q12H    ferric gluconate (FERRLECIT) 125 mg in sodium chloride 0.9 % 100 mL IVPB  125 mg IntraVENous Once    polyethylene glycol (GLYCOLAX) packet 17 g  17 g Oral Daily    bisacodyl (DULCOLAX) suppository 10 mg  10 mg Rectal Daily PRN    sodium bicarbonate tablet 1,300 mg  1,300 mg Oral TID    lactated ringers IV soln infusion   IntraVENous Continuous    amLODIPine (NORVASC) tablet 10 mg  10 mg Oral Daily    fluticasone (FLONASE) 50 MCG/ACT nasal spray 1 spray  1 spray Each Nostril Daily    cetirizine (ZYRTEC) tablet 5 mg  5 mg Oral Daily    ketotifen fumarate (ZADITOR) 0.035 % ophthalmic solution 1 drop  1 drop Both Eyes BID    sodium chloride flush 0.9 % injection 5-40 mL  5-40 mL IntraVENous 2 times per day    sodium chloride flush 0.9 % injection 5-40 mL  5-40 mL IntraVENous PRN    0.9 % sodium chloride infusion   IntraVENous PRN    potassium chloride 10 mEq/100 mL IVPB (Peripheral Line)  10 mEq IntraVENous PRN    magnesium sulfate 1000 mg in dextrose 5% 100 mL IVPB  1,000 mg IntraVENous PRN    prochlorperazine (COMPAZINE) tablet 10 mg  10 mg Oral Q6H PRN    Or    prochlorperazine (COMPAZINE) injection 10 mg  10 mg IntraVENous Q6H PRN    heparin (porcine) injection 5,000 Units  5,000 Units SubCUTAneous BID    ondansetron (ZOFRAN) injection 4 mg  4 mg IntraVENous Q6H PRN    simethicone (MYLICON) chewable tablet 80 mg  80 mg Oral Q6H PRN    HYDROmorphone (DILAUDID) injection 0.5 mg  0.5 mg IntraVENous Q3H PRN     HYDROmorphone (DILAUDID) injection 1 mg  1 mg IntraVENous Q3H PRN    oxyCODONE (ROXICODONE) immediate release tablet 5 mg  5 mg Oral Q4H PRN    oxyCODONE (ROXICODONE) immediate release tablet 10 mg  10 mg Oral Q4H PRN    hydrALAZINE (APRESOLINE) injection 10 mg  10 mg IntraVENous Q6H PRN    famotidine (PEPCID) tablet 20 mg  20 mg Oral Daily    Or    famotidine (PEPCID) 20 mg in sodium chloride (PF) 0.9 % 10 mL injection  20 mg IntraVENous Daily         Objective:     BP (!) 185/79   Pulse 100   Temp 98.4 °F (36.9 °C) (Oral)   Resp 20   Ht 1.651 m (5' 5\")   Wt 117.3 kg (258 lb 9.6 oz)   SpO2 94%   BMI 43.03 kg/m²         Temp (24hrs), Av.4 °F (37.4 °C), Min:98.4 °F (36.9 °C), Max:100.4 °F (38 °C)      No intake/output data recorded.   1901 -  0700  In: 3606.5 [P.O.:950; I.V.:2656.5]  Out: 5500 [Urine:5050; Drains:50]      BP (!) 185/79   Pulse 100   Temp 98.4 °F (36.9 °C) (Oral)   Resp 20   Ht 1.651 m (5' 5\")   Wt 117.3 kg (258 lb 9.6 oz)   SpO2 94%   BMI 43.03 kg/m²   Head: Normocephalic, without obvious abnormality  Neck: no JVD  Lungs: clear to auscultation bilaterally  Heart: regular rate and rhythm  Abdomen: distended, diffusely tender  Extremities: no edema          Data Review    Recent Results (from the past 48 hour(s))   Potassium    Collection Time: 24  9:15 AM   Result Value Ref Range    Potassium 5.9 (H) 3.5 - 5.1 mmol/L   CBC with Auto Differential    Collection Time: 24  4:42 AM   Result Value Ref Range    WBC 13.2 (H) 4.3 - 11.1 K/uL    RBC 3.30 (L) 4.05 - 5.2 M/uL    Hemoglobin 8.8 (L) 11.7 - 15.4 g/dL    Hematocrit 29.9 (L) 35.8 - 46.3 %    MCV 90.6 82 - 102 FL    MCH 26.7 26.1 - 32.9 PG    MCHC 29.4 (L) 31.4 - 35.0 g/dL    RDW 14.8 (H) 11.9 - 14.6 %    Platelets 180 150 - 450 K/uL    MPV 10.6 9.4 - 12.3 FL    nRBC 0.00 0.0 - 0.2 K/uL    Differential Type AUTOMATED      Neutrophils % 79 (H) 43 - 78 %    Lymphocytes % 12 (L) 13 - 44 %    Monocytes % 7 4.0 - 12.0

## 2024-06-09 NOTE — PROGRESS NOTES
Writer communication with Kim Frommel NP,    Patient of Dr. Crain s/p ventral hernia repair. During the day, NGT placed d/t ileus. Diet changed to NPO however night meds are still PO with MD adding PO reglan. Please advise r/t strict NPO vs NPO w/ sips or adjust MAR.    Per NP okay to make NPO w/ sips with meds.

## 2024-06-10 LAB
ANION GAP SERPL CALC-SCNC: 13 MMOL/L (ref 9–18)
BASOPHILS # BLD: 0 K/UL (ref 0–0.2)
BASOPHILS NFR BLD: 0 % (ref 0–2)
BUN SERPL-MCNC: 36 MG/DL (ref 8–23)
CALCIUM SERPL-MCNC: 8.9 MG/DL (ref 8.8–10.2)
CHLORIDE SERPL-SCNC: 107 MMOL/L (ref 98–107)
CO2 SERPL-SCNC: 19 MMOL/L (ref 20–28)
CREAT SERPL-MCNC: 4.81 MG/DL (ref 0.6–1.1)
DIFFERENTIAL METHOD BLD: ABNORMAL
EOSINOPHIL # BLD: 0.2 K/UL (ref 0–0.8)
EOSINOPHIL NFR BLD: 2 % (ref 0.5–7.8)
ERYTHROCYTE [DISTWIDTH] IN BLOOD BY AUTOMATED COUNT: 14.6 % (ref 11.9–14.6)
GLUCOSE BLD STRIP.AUTO-MCNC: 117 MG/DL (ref 65–100)
GLUCOSE SERPL-MCNC: 125 MG/DL (ref 70–99)
HCT VFR BLD AUTO: 30.2 % (ref 35.8–46.3)
HGB BLD-MCNC: 9.3 G/DL (ref 11.7–15.4)
IMM GRANULOCYTES # BLD AUTO: 0.1 K/UL (ref 0–0.5)
IMM GRANULOCYTES NFR BLD AUTO: 1 % (ref 0–5)
LYMPHOCYTES # BLD: 0.9 K/UL (ref 0.5–4.6)
LYMPHOCYTES NFR BLD: 11 % (ref 13–44)
MCH RBC QN AUTO: 27.2 PG (ref 26.1–32.9)
MCHC RBC AUTO-ENTMCNC: 30.8 G/DL (ref 31.4–35)
MCV RBC AUTO: 88.3 FL (ref 82–102)
MONOCYTES # BLD: 0.9 K/UL (ref 0.1–1.3)
MONOCYTES NFR BLD: 11 % (ref 4–12)
NEUTS SEG # BLD: 6.1 K/UL (ref 1.7–8.2)
NEUTS SEG NFR BLD: 75 % (ref 43–78)
NRBC # BLD: 0 K/UL (ref 0–0.2)
PLATELET # BLD AUTO: 211 K/UL (ref 150–450)
PLATELET COMMENT: ADEQUATE
PMV BLD AUTO: 11.2 FL (ref 9.4–12.3)
POTASSIUM SERPL-SCNC: 4.8 MMOL/L (ref 3.5–5.1)
RBC # BLD AUTO: 3.42 M/UL (ref 4.05–5.2)
RBC MORPH BLD: ABNORMAL
SERVICE CMNT-IMP: ABNORMAL
SODIUM SERPL-SCNC: 139 MMOL/L (ref 136–145)
WBC # BLD AUTO: 8.2 K/UL (ref 4.3–11.1)
WBC MORPH BLD: ABNORMAL

## 2024-06-10 PROCEDURE — 96372 THER/PROPH/DIAG INJ SC/IM: CPT

## 2024-06-10 PROCEDURE — 2580000003 HC RX 258: Performed by: INTERNAL MEDICINE

## 2024-06-10 PROCEDURE — 6370000000 HC RX 637 (ALT 250 FOR IP): Performed by: NURSE PRACTITIONER

## 2024-06-10 PROCEDURE — G0378 HOSPITAL OBSERVATION PER HR: HCPCS

## 2024-06-10 PROCEDURE — 96366 THER/PROPH/DIAG IV INF ADDON: CPT

## 2024-06-10 PROCEDURE — 96361 HYDRATE IV INFUSION ADD-ON: CPT

## 2024-06-10 PROCEDURE — 97530 THERAPEUTIC ACTIVITIES: CPT

## 2024-06-10 PROCEDURE — 96376 TX/PRO/DX INJ SAME DRUG ADON: CPT

## 2024-06-10 PROCEDURE — 96375 TX/PRO/DX INJ NEW DRUG ADDON: CPT

## 2024-06-10 PROCEDURE — 80048 BASIC METABOLIC PNL TOTAL CA: CPT

## 2024-06-10 PROCEDURE — 6370000000 HC RX 637 (ALT 250 FOR IP): Performed by: SURGERY

## 2024-06-10 PROCEDURE — 82962 GLUCOSE BLOOD TEST: CPT

## 2024-06-10 PROCEDURE — 36415 COLL VENOUS BLD VENIPUNCTURE: CPT

## 2024-06-10 PROCEDURE — 2580000003 HC RX 258: Performed by: NURSE PRACTITIONER

## 2024-06-10 PROCEDURE — 6360000002 HC RX W HCPCS: Performed by: NURSE PRACTITIONER

## 2024-06-10 PROCEDURE — 6360000002 HC RX W HCPCS

## 2024-06-10 PROCEDURE — 85025 COMPLETE CBC W/AUTO DIFF WBC: CPT

## 2024-06-10 PROCEDURE — 2580000003 HC RX 258: Performed by: SURGERY

## 2024-06-10 PROCEDURE — 6370000000 HC RX 637 (ALT 250 FOR IP): Performed by: INTERNAL MEDICINE

## 2024-06-10 PROCEDURE — 6360000002 HC RX W HCPCS: Performed by: SURGERY

## 2024-06-10 RX ORDER — METOCLOPRAMIDE HYDROCHLORIDE 5 MG/ML
5 INJECTION INTRAMUSCULAR; INTRAVENOUS EVERY 6 HOURS
Status: DISPENSED | OUTPATIENT
Start: 2024-06-10 | End: 2024-06-13

## 2024-06-10 RX ADMIN — PIPERACILLIN AND TAZOBACTAM 3375 MG: 3; .375 INJECTION, POWDER, LYOPHILIZED, FOR SOLUTION INTRAVENOUS at 05:51

## 2024-06-10 RX ADMIN — HYDRALAZINE HYDROCHLORIDE 10 MG: 20 INJECTION INTRAMUSCULAR; INTRAVENOUS at 20:52

## 2024-06-10 RX ADMIN — AMLODIPINE BESYLATE 10 MG: 10 TABLET ORAL at 08:15

## 2024-06-10 RX ADMIN — METOCLOPRAMIDE 5 MG: 5 INJECTION, SOLUTION INTRAMUSCULAR; INTRAVENOUS at 20:53

## 2024-06-10 RX ADMIN — SODIUM BICARBONATE 650 MG TABLET 1300 MG: at 08:15

## 2024-06-10 RX ADMIN — SODIUM CHLORIDE: 9 INJECTION, SOLUTION INTRAVENOUS at 05:50

## 2024-06-10 RX ADMIN — SODIUM CHLORIDE, POTASSIUM CHLORIDE, SODIUM LACTATE AND CALCIUM CHLORIDE: 600; 310; 30; 20 INJECTION, SOLUTION INTRAVENOUS at 20:48

## 2024-06-10 RX ADMIN — SODIUM CHLORIDE, PRESERVATIVE FREE 10 ML: 5 INJECTION INTRAVENOUS at 20:51

## 2024-06-10 RX ADMIN — SODIUM BICARBONATE 650 MG TABLET 1300 MG: at 20:49

## 2024-06-10 RX ADMIN — METOCLOPRAMIDE 5 MG: 5 INJECTION, SOLUTION INTRAMUSCULAR; INTRAVENOUS at 16:50

## 2024-06-10 RX ADMIN — POLYETHYLENE GLYCOL 3350 17 G: 17 POWDER, FOR SOLUTION ORAL at 08:15

## 2024-06-10 RX ADMIN — PIPERACILLIN AND TAZOBACTAM 3375 MG: 3; .375 INJECTION, POWDER, LYOPHILIZED, FOR SOLUTION INTRAVENOUS at 16:47

## 2024-06-10 RX ADMIN — CETIRIZINE HYDROCHLORIDE 5 MG: 10 TABLET, FILM COATED ORAL at 08:15

## 2024-06-10 RX ADMIN — HEPARIN SODIUM 5000 UNITS: 5000 INJECTION INTRAVENOUS; SUBCUTANEOUS at 08:16

## 2024-06-10 RX ADMIN — FLUTICASONE PROPIONATE 1 SPRAY: 50 SPRAY, METERED NASAL at 08:17

## 2024-06-10 RX ADMIN — METOCLOPRAMIDE 5 MG: 10 TABLET ORAL at 05:49

## 2024-06-10 RX ADMIN — KETOTIFEN FUMARATE 1 DROP: 0.25 SOLUTION/ DROPS OPHTHALMIC at 08:17

## 2024-06-10 RX ADMIN — FAMOTIDINE 20 MG: 20 TABLET, FILM COATED ORAL at 08:16

## 2024-06-10 RX ADMIN — HEPARIN SODIUM 5000 UNITS: 5000 INJECTION INTRAVENOUS; SUBCUTANEOUS at 20:54

## 2024-06-10 RX ADMIN — METOCLOPRAMIDE 5 MG: 10 TABLET ORAL at 10:43

## 2024-06-10 ASSESSMENT — PAIN DESCRIPTION - FREQUENCY: FREQUENCY: CONTINUOUS

## 2024-06-10 ASSESSMENT — PAIN DESCRIPTION - DESCRIPTORS: DESCRIPTORS: ACHING

## 2024-06-10 ASSESSMENT — PAIN DESCRIPTION - ONSET: ONSET: ON-GOING

## 2024-06-10 ASSESSMENT — PAIN SCALES - GENERAL: PAINLEVEL_OUTOF10: 3

## 2024-06-10 ASSESSMENT — PAIN DESCRIPTION - LOCATION: LOCATION: KNEE

## 2024-06-10 ASSESSMENT — PAIN DESCRIPTION - ORIENTATION: ORIENTATION: RIGHT;ANTERIOR

## 2024-06-10 ASSESSMENT — PAIN DESCRIPTION - PAIN TYPE: TYPE: CHRONIC PAIN

## 2024-06-10 NOTE — PROGRESS NOTES
Admit Date: 2024    POD 4 Days Post-Op    Procedure:  Procedure(s):  OPEN VENTRAL HERNIA REPAIR W/ MESH    POD 2-patient vomited.  Negative flatus negative bowel movement  Subjective:     A/O x 4 with NAD noted.  Up in chair. Respirations even and unlabored on room air.  NGT 1600 mL green output past 24 H.  Denies nausea or vomiting while NGT in place.  Negative flatus.  Negative bowel movement.  Abdominal drain 20 mL SS output past 24 H.  Voiding with unmeasurable urine occurrences past 24h.    Tmax 99.0.  BP 140s-190s/70s-90s.  HR = 90s-106.  Abdominal pain is well-controlled with as needed pain medication 8/10 at worst and 3/10 at best.    Objective:       Vitals:    06/10/24 0655 06/10/24 0815 06/10/24 1101 06/10/24 1513   BP: (!) 189/96 (!) 168/74 (!) 145/88 (!) 153/84   Pulse: 98  (!) 103 98   Resp:   17 17   Temp:   98.2 °F (36.8 °C) 98.4 °F (36.9 °C)   TempSrc:   Oral Oral   SpO2:   92% 94%   Weight:  117.9 kg (259 lb 14.4 oz)     Height:           Temp (24hrs), Av.5 °F (36.9 °C), Min:98.1 °F (36.7 °C), Max:99 °F (37.2 °C)  .  I&O reviewed as documented.    Physical Exam  Constitutional:       General: She is not in acute distress.  HENT:      Nose:      Comments: NGT      Mouth/Throat:      Mouth: Mucous membranes are dry.   Cardiovascular:      Rate and Rhythm: Normal rate and regular rhythm.      Pulses: Normal pulses.      Heart sounds: Normal heart sounds. No murmur heard.     No friction rub. No gallop.   Pulmonary:      Effort: Pulmonary effort is normal. No respiratory distress.      Breath sounds: Normal breath sounds.   Abdominal:      General: Bowel sounds are decreased. There is distension.      Comments: Mild abdominal distention.   Genitourinary:     Comments: voiding  Musculoskeletal:         General: No swelling. Normal range of motion.      Cervical back: No rigidity.   Skin:     General: Skin is warm and dry.      Capillary Refill: Capillary refill takes less than 2 seconds.       Comments: Midline abdominal incision with no signs of infection.  Abdominal drain with SS output no signs of infection   Neurological:      Mental Status: She is alert.   Psychiatric:         Behavior: Behavior normal.          Labs:   Recent Results (from the past 24 hour(s))   CBC with Auto Differential    Collection Time: 06/10/24  4:51 AM   Result Value Ref Range    WBC 8.2 4.3 - 11.1 K/uL    RBC 3.42 (L) 4.05 - 5.2 M/uL    Hemoglobin 9.3 (L) 11.7 - 15.4 g/dL    Hematocrit 30.2 (L) 35.8 - 46.3 %    MCV 88.3 82 - 102 FL    MCH 27.2 26.1 - 32.9 PG    MCHC 30.8 (L) 31.4 - 35.0 g/dL    RDW 14.6 11.9 - 14.6 %    Platelets 211 150 - 450 K/uL    MPV 11.2 9.4 - 12.3 FL    nRBC 0.00 0.0 - 0.2 K/uL    Neutrophils % 75 43 - 78 %    Lymphocytes % 11 (L) 13 - 44 %    Monocytes % 11 4.0 - 12.0 %    Eosinophils % 2 0.5 - 7.8 %    Basophils % 0 0.0 - 2.0 %    Immature Granulocytes % 1 0.0 - 5.0 %    Neutrophils Absolute 6.1 1.7 - 8.2 K/UL    Lymphocytes Absolute 0.9 0.5 - 4.6 K/UL    Monocytes Absolute 0.9 0.1 - 1.3 K/UL    Eosinophils Absolute 0.2 0.0 - 0.8 K/UL    Basophils Absolute 0.0 0.0 - 0.2 K/UL    Immature Granulocytes Absolute 0.1 0.0 - 0.5 K/UL    RBC Comment SLIGHT  ANISOCYTOSIS + POIKILOCYTOSIS        WBC Comment Result Confirmed By Smear      Platelet Comment ADEQUATE      Differential Type AUTOMATED     Basic Metabolic Panel    Collection Time: 06/10/24  4:51 AM   Result Value Ref Range    Sodium 139 136 - 145 mmol/L    Potassium 4.8 3.5 - 5.1 mmol/L    Chloride 107 98 - 107 mmol/L    CO2 19 (L) 20 - 28 mmol/L    Anion Gap 13 9 - 18 mmol/L    Glucose 125 (H) 70 - 99 mg/dL    BUN 36 (H) 8 - 23 MG/DL    Creatinine 4.81 (H) 0.60 - 1.10 MG/DL    Est, Glom Filt Rate 9 (L) >60 ml/min/1.73m2    Calcium 8.9 8.8 - 10.2 MG/DL         Assessment:     Principal Problem:    Ventral hernia without obstruction or gangrene  Active Problems:    Morbid obesity (HCC)  Resolved Problems:    * No resolved hospital problems.

## 2024-06-10 NOTE — PROGRESS NOTES
END OF SHIFT NOTE:    INTAKE/OUTPUT  06/09 0701 - 06/10 0700  In: 1627.2 [P.O.:360; I.V.:1017]  Out: 1920 [Urine:300; Drains:20]  Voiding: Yes  Catheter: No  Drain:   Closed/Suction Drain Abdomen (Active)   Site Description Clean, dry & intact 06/10/24 0815   Dressing Status Clean, dry & intact 06/10/24 0815   Drainage Appearance Serosanguinous 06/10/24 1354   Drain Status Compressed;To bulb suction 06/10/24 1354   Output (ml) 10 ml 06/10/24 1354       NG/OG/NJ/NE Tube Nasogastric Left nostril (Active)   Surrounding Skin Clean, dry & intact 06/10/24 0815   Securement device Adhesive based weber 06/10/24 0548   Status Suction-low continuous 06/10/24 0815   Placement Verified External Catheter Length 06/10/24 0548   NG/OG/NJ/NE External Measurement (cm) 62 cm 06/10/24 0815   Drainage Appearance Green 06/10/24 1655   Output (mL) 600 ml 06/10/24 1655   Action Taken Tubing changed 06/10/24 1655       External Urinary Catheter (Active)   Site Assessment Clean,dry & intact 06/10/24 0349   Placement Replaced 06/10/24 0349   Securement Method Securing device (Describe) 06/07/24 2015   Catheter Care Catheter/Wick replaced;Suction Canister/Tubing changed 06/10/24 0349   Perineal Care Yes 06/10/24 0349   Suction 40 mmgHg continuous 06/07/24 2015   Urine Color Bre 06/10/24 0349   Urine Appearance Clear 06/10/24 0349   Output (mL) 600 mL 06/10/24 0952               Flatus: Patient does not have flatus present.    Stool:  occurrences.    Characteristics:           Stool Assessment  Last BM (including prior to admit): 06/04/24    Emesis:  occurrences.    Characteristics:   Emesis Appearance: Brown    VITAL SIGNS  Patient Vitals for the past 12 hrs:   Temp Pulse Resp BP SpO2   06/10/24 1913 98.6 °F (37 °C) 100 18 (!) 188/101 94 %   06/10/24 1513 98.4 °F (36.9 °C) 98 17 (!) 153/84 94 %   06/10/24 1101 98.2 °F (36.8 °C) (!) 103 17 (!) 145/88 92 %   06/10/24 0815 -- -- -- (!) 168/74 --       Pain Assessment  Pain Level: 3 (06/10/24

## 2024-06-10 NOTE — PROGRESS NOTES
ACUTE PHYSICAL THERAPY GOALS:   (Developed with and agreed upon by patient and/or caregiver.)    (1.)Ms. Ruvalcaba will move from supine to sit and sit to supine  in bed with MODIFIED INDEPENDENCE within 7 treatment day(s).    (2.)Ms. Ruvalcaba will transfer from bed to chair and chair to bed with MODIFIED INDEPENDENCE using the least restrictive device within 7 treatment day(s).    (3.)Ms. Ruvalcaba will ambulate with MODIFIED INDEPENDENCE for 500 feet with the least restrictive device within 7 treatment day(s)    PHYSICAL THERAPY: Daily Note AM   (Link to Caseload Tracking: PT Visit Days : 2  Time In/Out PT Charge Capture  Rehab Caseload Tracker  Orders    Triny Ruvalcaba is a 78 y.o. female   PRIMARY DIAGNOSIS: Ventral hernia without obstruction or gangrene  Ventral hernia without obstruction or gangrene [K43.9]  Morbid obesity (HCC) [E66.01]  Procedure(s) (LRB):  OPEN VENTRAL HERNIA REPAIR W/ MESH (N/A)  4 Days Post-Op  Observation: Payor: [employer]ICARE / Plan: [employer] GOLD PLUS HMO / Product Type: *No Product type* /     ASSESSMENT:     REHAB RECOMMENDATIONS:   Recommendation to date pending progress:  Setting:  Home Health Therapy    Equipment:    To Be Determined     ASSESSMENT:  Ms. Ruvalcaba is supine in bed and agreeable to therapy.  Bed mobility is with min assist as the patient request assist moving her RLE.    Mobility to get to the edge of the bed is rather slow.  Sitting balance good.  Sit ot stand with min assist to the walker.  Patient is able to walk around the room to the recliner, she is able to position herself in the recliner.  Good session with progress demonstrated.  Continue PT efforts.  Patient has NG tube, purewick, IV and drain.  Nice lady, wants to get better.   Call bell within reach.     SUBJECTIVE:   Ms. Ruvalcaba states, \"Hello\"     Social/Functional Lives With: Son  Type of Home: House  Home Layout: One level  Home Access: Ramped entrance  Home Equipment: Rollator, Walker - Rolling (was not  using prior to admission)  ADL Assistance: Independent  Ambulation Assistance: Independent  Active : Yes  Occupation: Retired  OBJECTIVE:     PAIN: VITALS / O2: PRECAUTION / LINES / DRAINS:   Pre Treatment: not rated         Post Treatment: not rated Vitals        Oxygen    External Catheter, IV, IVANIA Drain, and Nasogastric Tube    RESTRICTIONS/PRECAUTIONS:  Restrictions/Precautions  Required Braces or Orthoses?: Yes  Required Braces or Orthoses?: Yes  Required Braces or Orthoses  Other: Abdominal Binder     MOBILITY: I Mod I S SBA CGA Min Mod Max Total  NT x2 Comments:   Bed Mobility    Rolling [] [] [] [] [] [x] [] [] [] [] []    Supine to Sit [] [] [] [] [] [x] [] [] [] [] []    Scooting [] [] [] [] [] [x] [] [] [] [] []    Sit to Supine [] [] [] [] [] [] [] [] [] [x] []    Transfers    Sit to Stand [] [] [] [] [] [x] [] [] [] [] []    Bed to Chair [] [] [] [] [] [x] [] [] [] [] []    Stand to Sit [] [] [] [] [] [x] [] [] [] [] []     [] [] [] [] [] [] [] [] [] [] []    I=Independent, Mod I=Modified Independent, S=Supervision, SBA=Standby Assistance, CGA=Contact Guard Assistance,   Min=Minimal Assistance, Mod=Moderate Assistance, Max=Maximal Assistance, Total=Total Assistance, NT=Not Tested    BALANCE: Good Fair+ Fair Fair- Poor NT Comments   Sitting Static [x] [] [] [] [] []    Sitting Dynamic [x] [] [] [] [] []              Standing Static [x] [] [] [] [] []    Standing Dynamic [] [] [x] [] [] []      GAIT: I Mod I S SBA CGA Min Mod Max Total  NT x2 Comments:   Level of Assistance [] [] [] [] [] [x] [] [] [] [] []    Distance 15  feet    DME Rolling Walker    Gait Quality Decreased jihan , Decreased step clearance, Decreased step length, and Decreased stance    Weightbearing Status      Stairs      I=Independent, Mod I=Modified Independent, S=Supervision, SBA=Standby Assistance, CGA=Contact Guard Assistance,   Min=Minimal Assistance, Mod=Moderate Assistance, Max=Maximal Assistance, Total=Total Assistance,

## 2024-06-10 NOTE — PROGRESS NOTES
END OF SHIFT NOTE:    INTAKE/OUTPUT  06/09 0701 - 06/10 0700  In: 1627.2 [P.O.:360; I.V.:1017]  Out: 1920 [Urine:300; Drains:20]  Voiding: Yes  Catheter: No  Drain:   Closed/Suction Drain Abdomen (Active)   Site Description Clean, dry & intact 06/09/24 2115   Dressing Status Clean, dry & intact 06/09/24 2115   Drainage Appearance Bright red 06/09/24 2115   Drain Status Compressed;To bulb suction 06/09/24 2115   Output (ml) 0 ml 06/10/24 0548       NG/OG/NJ/NE Tube Nasogastric Left nostril (Active)   Surrounding Skin Clean, dry & intact 06/10/24 0548   Securement device Adhesive based weber 06/10/24 0548   Status Suction-low continuous 06/10/24 0548   Placement Verified External Catheter Length 06/10/24 0548   NG/OG/NJ/NE External Measurement (cm) 62 cm 06/10/24 0548   Drainage Appearance Green 06/10/24 0548   Output (mL) 1200 ml 06/10/24 0548   Action Taken Tubing changed 06/10/24 0548       External Urinary Catheter (Active)   Site Assessment Clean,dry & intact 06/10/24 0349   Placement Replaced 06/10/24 0349   Securement Method Securing device (Describe) 06/07/24 2015   Catheter Care Catheter/Wick replaced;Suction Canister/Tubing changed 06/10/24 0349   Perineal Care Yes 06/10/24 0349   Suction 40 mmgHg continuous 06/07/24 2015   Urine Color Bre 06/10/24 0349   Urine Appearance Clear 06/10/24 0349   Output (mL) 300 mL 06/09/24 1630               Flatus: Patient does not have flatus present.    Stool:  occurrences.    Characteristics:           Stool Assessment  Last BM (including prior to admit): 06/04/24    Emesis:  occurrences.    Characteristics:   Emesis Appearance: Brown    VITAL SIGNS  Patient Vitals for the past 12 hrs:   Temp Pulse Resp BP SpO2   06/10/24 0349 98.4 °F (36.9 °C) 100 18 (!) 146/77 92 %   06/09/24 2250 98.1 °F (36.7 °C) (!) 102 18 (!) 147/66 92 %   06/09/24 1941 99 °F (37.2 °C) (!) 104 18 (!) 172/80 92 %   06/09/24 1815 -- (!) 102 -- (!) 165/82 --       Pain Assessment  Pain Level: 8

## 2024-06-10 NOTE — PROGRESS NOTES
Comprehensive Nutrition Assessment    Type and Reason for Visit: Reassess  Malnutrition Screening Tool: Malnutrition Screen  Have you recently lost weight without trying?: 2 to 13 pounds (1 point)  Have you been eating poorly because of a decreased appetite?: Yes (1 point)  Malnutrition Screening Tool Score: 2    Nutrition Recommendations/Plan:   NPO/Clear liquid diet status day number 4 is related to altered GI function (post surgical ileus vs SBO) .   It is reasonable to wait 7-10 days before initiating nutrition support in a patient with no or low nutrition risk. Malnutrition Status: At risk for malnutrition (Comment) (NPO/CLD due to altered GI function)  If anticipated patient will remain NPO/Clear liquid for greater than 2-3 days, consider nutrition support for primary needs.  If nutrition support pursued, order appropriate route and an Inpatient Consult to Dietitian for RD to manage.     Malnutrition Assessment:  Malnutrition Status: At risk for malnutrition (Comment) (NPO/CLD due to altered GI function)    No lenard wasting    Nutrition Assessment:  Nutrition History: Visited with patient in room. Reports that she has been dealing with waxing and waning discomfort in her stomach over the past year. Typically eats 1 meal per day with snacks. Will drink Ensure and Boost at home to supplement her intake; but reports it \"runs right through her\". Patient does not endorse any changes in appetite/intake pta.      Do You Have Any Cultural, Restoration, or Ethnic Food Preferences?: No   Weight History: 5/24/24: 254# (IM OV), 11/20/23: 253# (IM OV), 5/19/23: 265# (IM OV)  CBW: 259# (6/6 standing scale)   No significant weight changes.   Nutrition Background:       PMH: CKD5, degenerative disk disease, fibromyalgia, SBO, obesity, IBS, HTN, HLD. Admitted with ventral hernia with gangrene. Underwent open ventral hernia repair 6/6. Additional findings throughout admission of hyperkalemia.  6/9: KUB impression: Multiple

## 2024-06-10 NOTE — PROGRESS NOTES
Absolute 0.2 0.0 - 0.5 K/UL   Basic Metabolic Panel    Collection Time: 06/09/24  3:46 AM   Result Value Ref Range    Sodium 139 136 - 145 mmol/L    Potassium 5.1 3.5 - 5.1 mmol/L    Chloride 109 (H) 98 - 107 mmol/L    CO2 17 (L) 20 - 28 mmol/L    Anion Gap 13 9 - 18 mmol/L    Glucose 130 (H) 70 - 99 mg/dL    BUN 34 (H) 8 - 23 MG/DL    Creatinine 4.34 (H) 0.60 - 1.10 MG/DL    Est, Glom Filt Rate 10 (L) >60 ml/min/1.73m2    Calcium 9.1 8.8 - 10.2 MG/DL   CBC with Auto Differential    Collection Time: 06/10/24  4:51 AM   Result Value Ref Range    WBC 8.2 4.3 - 11.1 K/uL    RBC 3.42 (L) 4.05 - 5.2 M/uL    Hemoglobin 9.3 (L) 11.7 - 15.4 g/dL    Hematocrit 30.2 (L) 35.8 - 46.3 %    MCV 88.3 82 - 102 FL    MCH 27.2 26.1 - 32.9 PG    MCHC 30.8 (L) 31.4 - 35.0 g/dL    RDW 14.6 11.9 - 14.6 %    Platelets 211 150 - 450 K/uL    MPV 11.2 9.4 - 12.3 FL    nRBC 0.00 0.0 - 0.2 K/uL    Neutrophils % 75 43 - 78 %    Lymphocytes % 11 (L) 13 - 44 %    Monocytes % 11 4.0 - 12.0 %    Eosinophils % 2 0.5 - 7.8 %    Basophils % 0 0.0 - 2.0 %    Immature Granulocytes % 1 0.0 - 5.0 %    Neutrophils Absolute 6.1 1.7 - 8.2 K/UL    Lymphocytes Absolute 0.9 0.5 - 4.6 K/UL    Monocytes Absolute 0.9 0.1 - 1.3 K/UL    Eosinophils Absolute 0.2 0.0 - 0.8 K/UL    Basophils Absolute 0.0 0.0 - 0.2 K/UL    Immature Granulocytes Absolute 0.1 0.0 - 0.5 K/UL    RBC Comment SLIGHT  ANISOCYTOSIS + POIKILOCYTOSIS        WBC Comment Result Confirmed By Smear      Platelet Comment ADEQUATE      Differential Type AUTOMATED     Basic Metabolic Panel    Collection Time: 06/10/24  4:51 AM   Result Value Ref Range    Sodium 139 136 - 145 mmol/L    Potassium 4.8 3.5 - 5.1 mmol/L    Chloride 107 98 - 107 mmol/L    CO2 19 (L) 20 - 28 mmol/L    Anion Gap 13 9 - 18 mmol/L    Glucose 125 (H) 70 - 99 mg/dL    BUN 36 (H) 8 - 23 MG/DL    Creatinine 4.81 (H) 0.60 - 1.10 MG/DL    Est, Glom Filt Rate 9 (L) >60 ml/min/1.73m2    Calcium 8.9 8.8 - 10.2 MG/DL       Assessment      Patient Active Problem List    Diagnosis Date Noted    Chronic kidney disease, stage 5 (Formerly [facility]) 01/17/2023    Chronic kidney disease (CKD), stage V (Formerly [facility]) [936838] 01/17/2023    Ventral hernia without obstruction or gangrene 05/28/2024    Morbid obesity ([facility]) 05/28/2024    Metabolic acidosis 05/01/2024    Iron deficiency anemia 12/08/2022    Hypercholesterolemia 09/28/2022    IFG (impaired fasting glucose) 05/20/2022    Stage 4 chronic kidney disease (HCC) 04/05/2022    Allergic conjunctivitis of both eyes 10/30/2021    Allergic rhinitis due to pollen 04/28/2021    Abdominal pain 03/13/2021    Acute UTI 03/13/2021    Difficult or painful urination 03/13/2021    Obesity, morbid (Former[facility]) 08/29/2018    DDD (degenerative disc disease), lumbar 07/13/2017    Irritable bowel syndrome with diarrhea 11/30/2016    Chronic low back pain with bilateral sciatica 10/09/2016    History of small bowel obstruction 05/30/2016    Primary fibromyalgia 04/04/2016    Obesity 04/14/2015    Allergy 04/14/2015    Arthritis of both knees 04/14/2015    Benign hypertension with chronic kidney disease 04/14/2015    Hypokalemia            Problems Addressed by Nephrology     1. CKD stage V - pt currently within her renal function baseline. At office visit in May, pt has refused dialysis. No dialysis indications at this point(Dr. Yung is her primary Nephrologist)    Renal function is stable.       2. Chronic metabolic acidosis - PO NaHCO3     3. Hyperkalemia - mild, resolving     4. Anemia - She is iron deficient. Will give one dose of Ferrlecit and arrange for outpatient f/u in [facility]    5. Abdominal pain and distension -  possible ileus    Continue current regimen

## 2024-06-11 LAB
ANION GAP SERPL CALC-SCNC: 15 MMOL/L (ref 9–18)
BUN SERPL-MCNC: 42 MG/DL (ref 8–23)
CALCIUM SERPL-MCNC: 9 MG/DL (ref 8.8–10.2)
CHLORIDE SERPL-SCNC: 107 MMOL/L (ref 98–107)
CO2 SERPL-SCNC: 21 MMOL/L (ref 20–28)
CREAT SERPL-MCNC: 4.99 MG/DL (ref 0.6–1.1)
ERYTHROCYTE [DISTWIDTH] IN BLOOD BY AUTOMATED COUNT: 15 % (ref 11.9–14.6)
GLUCOSE SERPL-MCNC: 116 MG/DL (ref 70–99)
HCT VFR BLD AUTO: 29.2 % (ref 35.8–46.3)
HGB BLD-MCNC: 8.9 G/DL (ref 11.7–15.4)
MCH RBC QN AUTO: 26.8 PG (ref 26.1–32.9)
MCHC RBC AUTO-ENTMCNC: 30.5 G/DL (ref 31.4–35)
MCV RBC AUTO: 88 FL (ref 82–102)
NRBC # BLD: 0 K/UL (ref 0–0.2)
PLATELET # BLD AUTO: 232 K/UL (ref 150–450)
PMV BLD AUTO: 10.6 FL (ref 9.4–12.3)
POTASSIUM SERPL-SCNC: 4.7 MMOL/L (ref 3.5–5.1)
RBC # BLD AUTO: 3.32 M/UL (ref 4.05–5.2)
SODIUM SERPL-SCNC: 143 MMOL/L (ref 136–145)
WBC # BLD AUTO: 7.9 K/UL (ref 4.3–11.1)

## 2024-06-11 PROCEDURE — 2580000003 HC RX 258: Performed by: NURSE PRACTITIONER

## 2024-06-11 PROCEDURE — 36415 COLL VENOUS BLD VENIPUNCTURE: CPT

## 2024-06-11 PROCEDURE — 96372 THER/PROPH/DIAG INJ SC/IM: CPT

## 2024-06-11 PROCEDURE — 85027 COMPLETE CBC AUTOMATED: CPT

## 2024-06-11 PROCEDURE — 6360000002 HC RX W HCPCS

## 2024-06-11 PROCEDURE — 6370000000 HC RX 637 (ALT 250 FOR IP): Performed by: INTERNAL MEDICINE

## 2024-06-11 PROCEDURE — 6360000002 HC RX W HCPCS: Performed by: SURGERY

## 2024-06-11 PROCEDURE — G0378 HOSPITAL OBSERVATION PER HR: HCPCS

## 2024-06-11 PROCEDURE — 2500000003 HC RX 250 WO HCPCS: Performed by: SURGERY

## 2024-06-11 PROCEDURE — 96376 TX/PRO/DX INJ SAME DRUG ADON: CPT

## 2024-06-11 PROCEDURE — 84100 ASSAY OF PHOSPHORUS: CPT

## 2024-06-11 PROCEDURE — 97530 THERAPEUTIC ACTIVITIES: CPT

## 2024-06-11 PROCEDURE — 83735 ASSAY OF MAGNESIUM: CPT

## 2024-06-11 PROCEDURE — 84478 ASSAY OF TRIGLYCERIDES: CPT

## 2024-06-11 PROCEDURE — 2580000003 HC RX 258: Performed by: SURGERY

## 2024-06-11 PROCEDURE — 80048 BASIC METABOLIC PNL TOTAL CA: CPT

## 2024-06-11 PROCEDURE — 96361 HYDRATE IV INFUSION ADD-ON: CPT

## 2024-06-11 PROCEDURE — 6360000002 HC RX W HCPCS: Performed by: NURSE PRACTITIONER

## 2024-06-11 PROCEDURE — 2580000003 HC RX 258: Performed by: INTERNAL MEDICINE

## 2024-06-11 PROCEDURE — 96366 THER/PROPH/DIAG IV INF ADDON: CPT

## 2024-06-11 PROCEDURE — 96375 TX/PRO/DX INJ NEW DRUG ADDON: CPT

## 2024-06-11 RX ORDER — HYDRALAZINE HYDROCHLORIDE 20 MG/ML
10 INJECTION INTRAMUSCULAR; INTRAVENOUS EVERY 6 HOURS PRN
Status: DISCONTINUED | OUTPATIENT
Start: 2024-06-11 | End: 2024-06-26 | Stop reason: HOSPADM

## 2024-06-11 RX ADMIN — HYDRALAZINE HYDROCHLORIDE 10 MG: 20 INJECTION INTRAMUSCULAR; INTRAVENOUS at 23:32

## 2024-06-11 RX ADMIN — KETOTIFEN FUMARATE 1 DROP: 0.25 SOLUTION/ DROPS OPHTHALMIC at 08:05

## 2024-06-11 RX ADMIN — SODIUM CHLORIDE, POTASSIUM CHLORIDE, SODIUM LACTATE AND CALCIUM CHLORIDE: 600; 310; 30; 20 INJECTION, SOLUTION INTRAVENOUS at 16:28

## 2024-06-11 RX ADMIN — SODIUM BICARBONATE 650 MG TABLET 1300 MG: at 21:03

## 2024-06-11 RX ADMIN — METOCLOPRAMIDE 5 MG: 5 INJECTION, SOLUTION INTRAMUSCULAR; INTRAVENOUS at 09:54

## 2024-06-11 RX ADMIN — HEPARIN SODIUM 5000 UNITS: 5000 INJECTION INTRAVENOUS; SUBCUTANEOUS at 08:01

## 2024-06-11 RX ADMIN — METOCLOPRAMIDE 5 MG: 5 INJECTION, SOLUTION INTRAMUSCULAR; INTRAVENOUS at 21:05

## 2024-06-11 RX ADMIN — METOCLOPRAMIDE 5 MG: 5 INJECTION, SOLUTION INTRAMUSCULAR; INTRAVENOUS at 05:28

## 2024-06-11 RX ADMIN — HYDRALAZINE HYDROCHLORIDE 10 MG: 20 INJECTION INTRAMUSCULAR; INTRAVENOUS at 12:39

## 2024-06-11 RX ADMIN — PIPERACILLIN AND TAZOBACTAM 3375 MG: 3; .375 INJECTION, POWDER, LYOPHILIZED, FOR SOLUTION INTRAVENOUS at 05:27

## 2024-06-11 RX ADMIN — PROCHLORPERAZINE EDISYLATE 10 MG: 5 INJECTION INTRAMUSCULAR; INTRAVENOUS at 21:15

## 2024-06-11 RX ADMIN — FLUTICASONE PROPIONATE 1 SPRAY: 50 SPRAY, METERED NASAL at 08:06

## 2024-06-11 RX ADMIN — PIPERACILLIN AND TAZOBACTAM 4500 MG: 4; .5 INJECTION, POWDER, FOR SOLUTION INTRAVENOUS at 16:26

## 2024-06-11 RX ADMIN — METOCLOPRAMIDE 5 MG: 5 INJECTION, SOLUTION INTRAMUSCULAR; INTRAVENOUS at 16:21

## 2024-06-11 RX ADMIN — HEPARIN SODIUM 5000 UNITS: 5000 INJECTION INTRAVENOUS; SUBCUTANEOUS at 21:06

## 2024-06-11 RX ADMIN — FAMOTIDINE 20 MG: 10 INJECTION, SOLUTION INTRAVENOUS at 08:00

## 2024-06-11 NOTE — PROGRESS NOTES
Assessment  Pain Level: 3 (06/10/24 0815)  Pain Location: Knee  Patient's Stated Pain Goal: 0 - No pain    Ambulating  No    Shift report given to oncoming nurse at the bedside.    Chantelle De La Garza RN

## 2024-06-11 NOTE — PROGRESS NOTES
END OF SHIFT NOTE:    INTAKE/OUTPUT  06/10 0701 - 06/11 0700  In: 1123.1 [I.V.:1043.7]  Out: 4160 [Urine:1350; Drains:10]  Voiding: Yes  Catheter: Yes  Drain:   Closed/Suction Drain Abdomen (Active)   Site Description Clean, dry & intact 06/11/24 0659   Dressing Status Clean, dry & intact 06/11/24 0659   Drainage Appearance Serosanguinous 06/11/24 0659   Drain Status Compressed;To bulb suction 06/11/24 0659   Output (ml) 0 ml 06/11/24 0659       External Urinary Catheter (Active)   Site Assessment Clean,dry & intact 06/10/24 0349   Placement Replaced 06/10/24 0349   Securement Method Securing device (Describe) 06/07/24 2015   Catheter Care Catheter/Wick replaced;Suction Canister/Tubing changed 06/10/24 0349   Perineal Care Yes 06/10/24 0349   Suction 40 mmgHg continuous 06/07/24 2015   Urine Color Bre 06/10/24 0349   Urine Appearance Clear 06/10/24 0349   Output (mL) 300 mL 06/11/24 1407               Flatus: Patient does have flatus present.    Stool:  occurrences.  0  Characteristics:           Stool Assessment  Last BM (including prior to admit): 06/04/24    Emesis:  occurrences.  0  Characteristics:   Emesis Appearance: Brown    VITAL SIGNS  Patient Vitals for the past 12 hrs:   Temp Pulse Resp BP SpO2   06/11/24 1516 98.1 °F (36.7 °C) (!) 103 17 120/83 100 %   06/11/24 1230 -- -- -- (!) 187/94 --   06/11/24 1114 97.3 °F (36.3 °C) (!) 104 17 (!) 136/105 95 %   06/11/24 0659 97.9 °F (36.6 °C) (!) 101 17 (!) 146/69 91 %       Pain Assessment  Pain Level: 3 (06/10/24 0815)  Pain Location: Knee  Patient's Stated Pain Goal: 0 - No pain    Ambulating  Yes    Shift report given to oncoming nurse at the bedside.    Melly Campbell RN

## 2024-06-11 NOTE — PROGRESS NOTES
Subjective:   Daily Progress Note: 6/11/2024 12:58 PM    Seen and examined in bedside chair. Comfortable.     Comfortable  No CP No SOB  + Abd pain  MS -      Current Facility-Administered Medications   Medication Dose Route Frequency    piperacillin-tazobactam (ZOSYN) 4,500 mg in sodium chloride 0.9 % 100 mL IVPB (mini-bag)  4,500 mg IntraVENous Q12H    hydrALAZINE (APRESOLINE) injection 10 mg  10 mg IntraVENous Q6H PRN    metoclopramide (REGLAN) injection 5 mg  5 mg IntraVENous Q6H    sodium bicarbonate tablet 1,300 mg  1,300 mg Oral BID    polyethylene glycol (GLYCOLAX) packet 17 g  17 g Oral Daily    bisacodyl (DULCOLAX) suppository 10 mg  10 mg Rectal Daily PRN    lactated ringers IV soln infusion   IntraVENous Continuous    amLODIPine (NORVASC) tablet 10 mg  10 mg Oral Daily    fluticasone (FLONASE) 50 MCG/ACT nasal spray 1 spray  1 spray Each Nostril Daily    cetirizine (ZYRTEC) tablet 5 mg  5 mg Oral Daily    ketotifen fumarate (ZADITOR) 0.035 % ophthalmic solution 1 drop  1 drop Both Eyes BID    sodium chloride flush 0.9 % injection 5-40 mL  5-40 mL IntraVENous 2 times per day    sodium chloride flush 0.9 % injection 5-40 mL  5-40 mL IntraVENous PRN    0.9 % sodium chloride infusion   IntraVENous PRN    potassium chloride 10 mEq/100 mL IVPB (Peripheral Line)  10 mEq IntraVENous PRN    magnesium sulfate 1000 mg in dextrose 5% 100 mL IVPB  1,000 mg IntraVENous PRN    prochlorperazine (COMPAZINE) tablet 10 mg  10 mg Oral Q6H PRN    Or    prochlorperazine (COMPAZINE) injection 10 mg  10 mg IntraVENous Q6H PRN    heparin (porcine) injection 5,000 Units  5,000 Units SubCUTAneous BID    simethicone (MYLICON) chewable tablet 80 mg  80 mg Oral Q6H PRN    HYDROmorphone (DILAUDID) injection 0.5 mg  0.5 mg IntraVENous Q3H PRN    HYDROmorphone (DILAUDID) injection 1 mg  1 mg IntraVENous Q3H PRN    oxyCODONE (ROXICODONE) immediate release tablet 5 mg  5 mg Oral Q4H PRN    oxyCODONE (ROXICODONE) immediate release tablet  SLIGHT  ANISOCYTOSIS + POIKILOCYTOSIS        WBC Comment Result Confirmed By Smear      Platelet Comment ADEQUATE      Differential Type AUTOMATED     Basic Metabolic Panel    Collection Time: 06/10/24  4:51 AM   Result Value Ref Range    Sodium 139 136 - 145 mmol/L    Potassium 4.8 3.5 - 5.1 mmol/L    Chloride 107 98 - 107 mmol/L    CO2 19 (L) 20 - 28 mmol/L    Anion Gap 13 9 - 18 mmol/L    Glucose 125 (H) 70 - 99 mg/dL    BUN 36 (H) 8 - 23 MG/DL    Creatinine 4.81 (H) 0.60 - 1.10 MG/DL    Est, Glom Filt Rate 9 (L) >60 ml/min/1.73m2    Calcium 8.9 8.8 - 10.2 MG/DL   POCT Glucose    Collection Time: 06/10/24  7:15 PM   Result Value Ref Range    POC Glucose 117 (H) 65 - 100 mg/dL    Performed by: Lilia    CBC    Collection Time: 06/11/24  8:25 AM   Result Value Ref Range    WBC 7.9 4.3 - 11.1 K/uL    RBC 3.32 (L) 4.05 - 5.2 M/uL    Hemoglobin 8.9 (L) 11.7 - 15.4 g/dL    Hematocrit 29.2 (L) 35.8 - 46.3 %    MCV 88.0 82 - 102 FL    MCH 26.8 26.1 - 32.9 PG    MCHC 30.5 (L) 31.4 - 35.0 g/dL    RDW 15.0 (H) 11.9 - 14.6 %    Platelets 232 150 - 450 K/uL    MPV 10.6 9.4 - 12.3 FL    nRBC 0.00 0.0 - 0.2 K/uL   Basic Metabolic Panel w/ Reflex to MG    Collection Time: 06/11/24  8:25 AM   Result Value Ref Range    Sodium 143 136 - 145 mmol/L    Potassium 4.7 3.5 - 5.1 mmol/L    Chloride 107 98 - 107 mmol/L    CO2 21 20 - 28 mmol/L    Anion Gap 15 9 - 18 mmol/L    Glucose 116 (H) 70 - 99 mg/dL    BUN 42 (H) 8 - 23 MG/DL    Creatinine 4.99 (H) 0.60 - 1.10 MG/DL    Est, Glom Filt Rate 8 (L) >60 ml/min/1.73m2    Calcium 9.0 8.8 - 10.2 MG/DL       Assessment     Patient Active Problem List    Diagnosis Date Noted    Chronic kidney disease, stage 5 (Formerly [facility]) 01/17/2023    Chronic kidney disease (CKD), stage V (Former[facility]) [[id number]] 01/17/2023    Ventral hernia without obstruction or gangrene 05/28/2024    Morbid obesity (HCC) 05/28/2024    Metabolic acidosis 05/01/2024    Iron deficiency anemia 12/08/2022    Hypercholesterolemia

## 2024-06-11 NOTE — PROGRESS NOTES
Admit Date: 2024    POD 5 Days Post-Op    Procedure:  Procedure(s):  OPEN VENTRAL HERNIA REPAIR W/ MESH    POD 2-patient vomited.  Negative flatus negative bowel movement  Subjective:     A/O x 4 with NAD noted.  Up in chair. Respirations even and unlabored on room air.  NGT 2800 mL green output past 24 H.  Denies nausea or vomiting while NGT in place.  Positive flatus-20 4 AM.  Negative bowel movement.  Abdominal drain 10  mL SS output past 24 H.  1350 mL UOP-voiding/pure wick   Tmax 99.3.  BP 130s-180s/70s-104.    HR = 90s-104.  Abdominal pain is well-controlled with as needed pain 3/10.    Objective:       Vitals:    24 0659 24 1114 24 1230 24 1516   BP: (!) 146/69 (!) 136/105 (!) 187/94 120/83   Pulse: (!) 101 (!) 104  (!) 103   Resp: 17 17  17   Temp: 97.9 °F (36.6 °C) 97.3 °F (36.3 °C)  98.1 °F (36.7 °C)   TempSrc: Oral Oral  Oral   SpO2: 91% 95%  100%   Weight:       Height:           Temp (24hrs), Av.3 °F (36.8 °C), Min:97.3 °F (36.3 °C), Max:99.3 °F (37.4 °C)  .  I&O reviewed as documented.    Physical Exam  Constitutional:       General: She is not in acute distress.  HENT:      Nose:      Comments: NGT      Mouth/Throat:      Mouth: Mucous membranes are dry.   Cardiovascular:      Rate and Rhythm: Normal rate and regular rhythm.      Pulses: Normal pulses.      Heart sounds: Normal heart sounds. No murmur heard.     No friction rub. No gallop.   Pulmonary:      Effort: Pulmonary effort is normal. No respiratory distress.      Breath sounds: Normal breath sounds.   Abdominal:      General: Bowel sounds are decreased.      Palpations: Abdomen is soft.      Comments: Mild abdominal distention.   Genitourinary:     Comments: voiding  Musculoskeletal:         General: No swelling. Normal range of motion.      Cervical back: No rigidity.   Skin:     General: Skin is warm and dry.      Capillary Refill: Capillary refill takes less than 2 seconds.      Comments: Midline

## 2024-06-11 NOTE — CARE COORDINATION
CM spoke to patient at bedside to inform her PT/OT are recommending home health care. Patient is aware and in agreement. CM gave patient home health care choice list. Patient stated that she had [facility] home [facility] care services in the past and would like referral to be sent to [facility].     CM sent referral to [facility] accordingly.

## 2024-06-11 NOTE — PROGRESS NOTES
ACUTE PHYSICAL THERAPY GOALS:   (Developed with and agreed upon by patient and/or caregiver.)    (1.)Ms. Ruvalcaba will move from supine to sit and sit to supine  in bed with MODIFIED INDEPENDENCE within 7 treatment day(s).    (2.)Ms. Ruvalcaba will transfer from bed to chair and chair to bed with MODIFIED INDEPENDENCE using the least restrictive device within 7 treatment day(s).    (3.)Ms. Ruvalcaba will ambulate with MODIFIED INDEPENDENCE for 500 feet with the least restrictive device within 7 treatment day(s)    PHYSICAL THERAPY: Daily Note AM   (Link to Caseload Tracking: PT Visit Days : 3  Time In/Out PT Charge Capture  Rehab Caseload Tracker  Orders    Triny Ruvalcaba is a 78 y.o. female   PRIMARY DIAGNOSIS: Ventral hernia without obstruction or gangrene  Ventral hernia without obstruction or gangrene [K43.9]  Morbid obesity (HCC) [E66.01]  Procedure(s) (LRB):  OPEN VENTRAL HERNIA REPAIR W/ MESH (N/A)  5 Days Post-Op  Observation: Payor: [employer] MEDICARE / Plan: [employer] GOLD PLUS HMO / Product Type: *No Product type* /     ASSESSMENT:     REHAB RECOMMENDATIONS:   Recommendation to date pending progress:  Setting:  Home Health Therapy    Equipment:    To Be Determined     ASSESSMENT:  Ms. Ruvalcaba is supine in bed and agreeable to therapy this morning. Pt required min A for bed mobility today. Pt demonstrated good sitting balance EOB for performing LE exercises. Pt then transferred STS with min A to RW. She was able to transfer to the chair with CGA with RW and cues for RW safety prior to sitting. Pt seated in chair for ADLs to work on activity tolerance and then reviewed LE exercises again. Pt left with all needs in reach and RN notified. PT to continue to follow.       SUBJECTIVE:   Ms. Ruvalcaba states, \"Yes, I would like that\"     Social/Functional Lives With: Son  Type of Home: House  Home Layout: One level  Home Access: Ramped entrance  Home Equipment: Rollator, Walker - Rolling (was not using prior to

## 2024-06-12 ENCOUNTER — APPOINTMENT (OUTPATIENT)
Dept: GENERAL RADIOLOGY | Age: 79
DRG: 353 | End: 2024-06-12
Attending: SURGERY
Payer: MEDICARE

## 2024-06-12 LAB
ANION GAP SERPL CALC-SCNC: 17 MMOL/L (ref 9–18)
BUN SERPL-MCNC: 46 MG/DL (ref 8–23)
CALCIUM SERPL-MCNC: 9.7 MG/DL (ref 8.8–10.2)
CHLORIDE SERPL-SCNC: 105 MMOL/L (ref 98–107)
CO2 SERPL-SCNC: 22 MMOL/L (ref 20–28)
CREAT SERPL-MCNC: 5.23 MG/DL (ref 0.6–1.1)
GLUCOSE SERPL-MCNC: 127 MG/DL (ref 70–99)
MAGNESIUM SERPL-MCNC: 1.8 MG/DL (ref 1.8–2.4)
MAGNESIUM SERPL-MCNC: 2.1 MG/DL (ref 1.8–2.4)
PHOSPHATE SERPL-MCNC: 4.7 MG/DL (ref 2.5–4.5)
PHOSPHATE SERPL-MCNC: 5.2 MG/DL (ref 2.5–4.5)
POTASSIUM SERPL-SCNC: 4.5 MMOL/L (ref 3.5–5.1)
SODIUM SERPL-SCNC: 144 MMOL/L (ref 136–145)
TRIGL SERPL-MCNC: 291 MG/DL (ref 0–150)

## 2024-06-12 PROCEDURE — 97530 THERAPEUTIC ACTIVITIES: CPT

## 2024-06-12 PROCEDURE — 80048 BASIC METABOLIC PNL TOTAL CA: CPT

## 2024-06-12 PROCEDURE — 96366 THER/PROPH/DIAG IV INF ADDON: CPT

## 2024-06-12 PROCEDURE — 83735 ASSAY OF MAGNESIUM: CPT

## 2024-06-12 PROCEDURE — 74018 RADEX ABDOMEN 1 VIEW: CPT

## 2024-06-12 PROCEDURE — 6360000002 HC RX W HCPCS

## 2024-06-12 PROCEDURE — 96376 TX/PRO/DX INJ SAME DRUG ADON: CPT

## 2024-06-12 PROCEDURE — 96361 HYDRATE IV INFUSION ADD-ON: CPT

## 2024-06-12 PROCEDURE — 76937 US GUIDE VASCULAR ACCESS: CPT

## 2024-06-12 PROCEDURE — 6370000000 HC RX 637 (ALT 250 FOR IP)

## 2024-06-12 PROCEDURE — G0378 HOSPITAL OBSERVATION PER HR: HCPCS

## 2024-06-12 PROCEDURE — 96368 THER/DIAG CONCURRENT INF: CPT

## 2024-06-12 PROCEDURE — 74019 RADEX ABDOMEN 2 VIEWS: CPT

## 2024-06-12 PROCEDURE — 2580000003 HC RX 258: Performed by: SURGERY

## 2024-06-12 PROCEDURE — 2500000003 HC RX 250 WO HCPCS: Performed by: SURGERY

## 2024-06-12 PROCEDURE — 96372 THER/PROPH/DIAG INJ SC/IM: CPT

## 2024-06-12 PROCEDURE — 96367 TX/PROPH/DG ADDL SEQ IV INF: CPT

## 2024-06-12 PROCEDURE — 6360000002 HC RX W HCPCS: Performed by: SURGERY

## 2024-06-12 PROCEDURE — 36415 COLL VENOUS BLD VENIPUNCTURE: CPT

## 2024-06-12 PROCEDURE — 6370000000 HC RX 637 (ALT 250 FOR IP): Performed by: INTERNAL MEDICINE

## 2024-06-12 RX ORDER — SODIUM CHLORIDE, SODIUM LACTATE, POTASSIUM CHLORIDE, CALCIUM CHLORIDE 600; 310; 30; 20 MG/100ML; MG/100ML; MG/100ML; MG/100ML
INJECTION, SOLUTION INTRAVENOUS CONTINUOUS
Status: CANCELLED | OUTPATIENT
Start: 2024-06-12 | End: 2024-06-12

## 2024-06-12 RX ORDER — BISACODYL 10 MG
10 SUPPOSITORY, RECTAL RECTAL DAILY
Status: DISPENSED | OUTPATIENT
Start: 2024-06-12 | End: 2024-06-14

## 2024-06-12 RX ADMIN — I.V. FAT EMULSION 250 ML: 20 EMULSION INTRAVENOUS at 17:37

## 2024-06-12 RX ADMIN — HYDROMORPHONE HYDROCHLORIDE 0.5 MG: 1 INJECTION, SOLUTION INTRAMUSCULAR; INTRAVENOUS; SUBCUTANEOUS at 22:54

## 2024-06-12 RX ADMIN — METOCLOPRAMIDE 5 MG: 5 INJECTION, SOLUTION INTRAMUSCULAR; INTRAVENOUS at 17:38

## 2024-06-12 RX ADMIN — SODIUM BICARBONATE 650 MG TABLET 1300 MG: at 22:18

## 2024-06-12 RX ADMIN — BISACODYL 10 MG: 10 SUPPOSITORY RECTAL at 10:04

## 2024-06-12 RX ADMIN — METOCLOPRAMIDE 5 MG: 5 INJECTION, SOLUTION INTRAMUSCULAR; INTRAVENOUS at 10:03

## 2024-06-12 RX ADMIN — HEPARIN SODIUM 5000 UNITS: 5000 INJECTION INTRAVENOUS; SUBCUTANEOUS at 22:16

## 2024-06-12 RX ADMIN — PROCHLORPERAZINE EDISYLATE 10 MG: 5 INJECTION INTRAMUSCULAR; INTRAVENOUS at 03:15

## 2024-06-12 RX ADMIN — SODIUM CHLORIDE, PRESERVATIVE FREE 10 ML: 5 INJECTION INTRAVENOUS at 22:34

## 2024-06-12 RX ADMIN — METOCLOPRAMIDE 5 MG: 5 INJECTION, SOLUTION INTRAMUSCULAR; INTRAVENOUS at 22:16

## 2024-06-12 RX ADMIN — SODIUM ACETATE: 164 INJECTION, SOLUTION, CONCENTRATE INTRAVENOUS at 17:36

## 2024-06-12 ASSESSMENT — PAIN SCALES - GENERAL
PAINLEVEL_OUTOF10: 5
PAINLEVEL_OUTOF10: 0

## 2024-06-12 ASSESSMENT — PAIN DESCRIPTION - LOCATION: LOCATION: ABDOMEN

## 2024-06-12 NOTE — PROGRESS NOTES
Admit Date: 2024    POD 6 Days Post-Op    Procedure:  Procedure(s):  OPEN VENTRAL HERNIA REPAIR W/ MESH    POD 2-patient vomited.  Negative flatus negative bowel movement  Subjective:     A/O x 4 with NAD noted.  Respirations even and unlabored on room air.  Patient with emesis x 2 overnight (1100 mL emesis).  Positive flatus.  Positive nausea.  TMAX 99.5.  HR = 90s-114.  BP 120s-180s/60s-110s.  Denies abdominal pain.  Voiding-pure wick 1600 mL UOP past 24 H.    Objective:       Vitals:    24 0307 24 0711 24 1315 24 1521   BP: (!) 148/68 (!) 145/69  (!) 165/76   Pulse: (!) 114 97  99   Resp: 18 17  18   Temp: 99 °F (37.2 °C) 97.9 °F (36.6 °C)  98.9 °F (37.2 °C)   TempSrc: Oral Oral  Axillary   SpO2: 94% 94%  95%   Weight:   114.4 kg (252 lb 4.8 oz)    Height:           Temp (24hrs), Av.9 °F (37.2 °C), Min:97.9 °F (36.6 °C), Max:99.5 °F (37.5 °C)  .  I&O reviewed as documented.  Abdominal drain with scant SS output past 24 H  Physical Exam  Constitutional:       General: She is not in acute distress.  HENT:      Mouth/Throat:      Mouth: Mucous membranes are dry.   Cardiovascular:      Rate and Rhythm: Normal rate and regular rhythm.      Pulses: Normal pulses.      Heart sounds: Normal heart sounds. No murmur heard.     No friction rub. No gallop.   Pulmonary:      Effort: Pulmonary effort is normal. No respiratory distress.      Breath sounds: Normal breath sounds.   Abdominal:      General: Bowel sounds are decreased.      Palpations: Abdomen is soft.   Genitourinary:     Comments: voiding  Musculoskeletal:         General: No swelling. Normal range of motion.      Cervical back: No rigidity.   Skin:     General: Skin is warm and dry.      Capillary Refill: Capillary refill takes less than 2 seconds.      Comments: Midline abdominal incision with no signs of infection.  Abdominal drain with SS output no signs of infection   Neurological:      Mental Status: She is alert.

## 2024-06-12 NOTE — PROGRESS NOTES
ACUTE PHYSICAL THERAPY GOALS:   (Developed with and agreed upon by patient and/or caregiver.)    (1.)Ms. Ruvalcaba will move from supine to sit and sit to supine  in bed with MODIFIED INDEPENDENCE within 7 treatment day(s).    (2.)Ms. Ruvalcaba will transfer from bed to chair and chair to bed with MODIFIED INDEPENDENCE using the least restrictive device within 7 treatment day(s).    (3.)Ms. Ruvalcaba will ambulate with MODIFIED INDEPENDENCE for 500 feet with the least restrictive device within 7 treatment day(s)    PHYSICAL THERAPY: Daily Note AM   (Link to Caseload Tracking: PT Visit Days : 4  Time In/Out PT Charge Capture  Rehab Caseload Tracker  Orders    Triny Ruvalcaba is a 78 y.o. female   PRIMARY DIAGNOSIS: Ventral hernia without obstruction or gangrene  Ventral hernia without obstruction or gangrene [K43.9]  Morbid obesity (HCC) [E66.01]  Procedure(s) (LRB):  OPEN VENTRAL HERNIA REPAIR W/ MESH (N/A)  6 Days Post-Op  Observation: Payor: [employer] MEDICARE / Plan: [employer] GOLD PLUS HMO / Product Type: *No Product type* /     ASSESSMENT:     REHAB RECOMMENDATIONS:   Recommendation to date pending progress:  Setting:  Home Health Therapy    Equipment:    To Be Determined     ASSESSMENT:  Ms. Ruvalcaba is supine in bed and agreeable to therapy this morning even though she reports throwing up last night.  She sat up min assist and scooted to edge of bed.  She then began throwing up in green bag for several minutes.  RN notified.  She did not feel like getting to chair. She did stand with RW CGA and took a side step or two to head of bed.  She needed mod assist sit to supine.  She did a few exercises in the bed.   Pt left with all needs in reach and RN notified. PT to continue to follow.       SUBJECTIVE:   Ms. Ruvalcaba states, \"been throwing up\"     Social/Functional Lives With: Son  Type of Home: House  Home Layout: One level  Home Access: Ramped entrance  Home Equipment: Rollator, Walker - Rolling (was not using prior to

## 2024-06-12 NOTE — PROGRESS NOTES
US Guided PIV access-    Ultrasound was used to find the vein which was compressible and without any ultrasound features of an artery or nerve bundle. Skin was cleaned and disinfected prior to IV puncture.  Under real-time ultrasound guidance peripheral access was obtained in the right forearm using 22 G 1.75\" Peripheral IV catheter after 1 attempt(s). Blood return was present and IV flushed without difficulty with no clinical signs of infiltration. IV dressing applied and no immediate complications noted. Patient tolerated the procedure well.       US Guided PIV access-    Ultrasound was used to find the vein which was compressible and without any ultrasound features of an artery or nerve bundle. Skin was cleaned and disinfected prior to IV puncture.  Under real-time ultrasound guidance peripheral access was obtained in the left forearm using 22 G 2.5\" B Akbar Deep Access after 1 attempt(s). Blood return was present and IV flushed without difficulty with no clinical signs of infiltration. IV dressing applied and no immediate complications noted. Patient tolerated the procedure well.

## 2024-06-12 NOTE — PROGRESS NOTES
END OF SHIFT NOTE:    INTAKE/OUTPUT  06/11 0701 - 06/12 0700  In: 1568.5 [P.O.:340; I.V.:1123.2]  Out: 2950 [Urine:1600]  Voiding: Yes  Catheter: No  Drain:   Closed/Suction Drain Abdomen (Active)   Site Description Clean, dry & intact 06/11/24 1940   Dressing Status Clean, dry & intact 06/11/24 1940   Drainage Appearance Serosanguinous 06/11/24 1940   Drain Status Compressed;To bulb suction 06/11/24 1940   Output (ml) 0 ml 06/11/24 1940       External Urinary Catheter (Active)   Site Assessment Clean,dry & intact 06/10/24 0349   Placement Replaced 06/10/24 0349   Securement Method Securing device (Describe) 06/07/24 2015   Catheter Care Catheter/Wick replaced;Suction Canister/Tubing changed 06/10/24 0349   Perineal Care Yes 06/10/24 0349   Suction 40 mmgHg continuous 06/07/24 2015   Urine Color Bre 06/10/24 0349   Urine Appearance Clear 06/10/24 0349   Output (mL) 250 mL 06/11/24 2258               Flatus: Patient does have flatus present.    Stool:  0 occurrences.    Characteristics:           Stool Assessment  Last BM (including prior to admit): 06/04/24    Emesis:  0 occurrences.    Characteristics:   Emesis Appearance: Brown    VITAL SIGNS  Patient Vitals for the past 12 hrs:   Temp Pulse Resp BP SpO2   06/12/24 0711 97.9 °F (36.6 °C) 97 17 (!) 145/69 94 %   06/12/24 0307 99 °F (37.2 °C) (!) 114 18 (!) 148/68 94 %   06/12/24 0026 -- (!) 104 -- (!) 155/60 --   06/11/24 2315 -- (!) 103 -- (!) 179/113 --   06/11/24 2258 99 °F (37.2 °C) (!) 104 17 (!) 186/76 92 %   06/11/24 2011 99.5 °F (37.5 °C) 97 18 (!) 142/76 94 %       Pain Assessment  Pain Level: 3 (06/10/24 0815)  Pain Location: Knee  Patient's Stated Pain Goal: 0 - No pain    Ambulating  Yes    Shift report given to oncoming nurse at the bedside.    Chantelle De La Garza RN

## 2024-06-12 NOTE — PROGRESS NOTES
Comprehensive Nutrition Assessment    Type and Reason for Visit: Reassess  Malnutrition Screening Tool: Malnutrition Screen  Have you recently lost weight without trying?: 2 to 13 pounds (1 point)  Have you been eating poorly because of a decreased appetite?: Yes (1 point)  Malnutrition Screening Tool Score: 2    Nutrition Recommendations/Plan:   Parenteral Nutrition:  Peripheral parenteral nutrition (Osmolarity 791)   Peripheral line infusion  Initiate: Dex 5%, 4.25% AA 1.56 L (65ml/hr)   Initiate 250 ml 20% lipids 3x/week (MWF)  Average to provide: 744 kcal/day (52% of needs), 78 grams of protein/day (105% of needs), 84 grams of CHO/day and 1667 ml of total volume/day.   Above regimen: Intended to meet macronutrient goals  Electrolytes/L:   Sodium ( 15 meq NaAcetate and 35 meq NaCl), Potassium (contribution from kphos only) Phosphorus ( 5 mmol KPO4), Calcium (4.5 meq), Magnesium 8 meq   ~1:1 Acetate:Chloride ratio due to HD at baseline  Additives per bag:   MVI, MTE  Thiamine 100 mg daily x 7 days (EOT 6/18)  Folic Acid 1 mg daily x 7 days (EOT 6/18)  Nutrition Related Medication Management:  Electrolyte Replacement Protocol PRN Deferred due to CrCl    IVF:  Discontinue at 1800  Labs:   BMP daily  Mg daily x 7 days at initiation then MWF  Phos daily x 7 days at initiation then MWF    Triglyceride tomorrow  Hepatic Panel tomorrow  POC Glucoses/SSI Not indicated       Malnutrition Assessment:  Malnutrition Status: At risk for malnutrition (Comment) (NPO/CLD due to altered GI function)    No lenard wasting    Nutrition Assessment:  Nutrition History: Visited with patient in room. Reports that she has been dealing with waxing and waning discomfort in her stomach over the past year. Typically eats 1 meal per day with snacks. Will drink Ensure and Boost at home to supplement her intake; but reports it \"runs right through her\". Patient does not endorse any changes in appetite/intake pta.      Do You Have Any Cultural,  Sabianism, or Ethnic Food Preferences?: No   Weight History: 5/24/24: 254# (IM OV), 11/20/23: 253# (IM OV), 5/19/23: 265# (IM OV)  CBW: 259# (6/6 standing scale)   No significant weight changes.   Nutrition Background:       PMH: CKD5, degenerative disk disease, fibromyalgia, SBO, obesity, IBS, HTN, HLD. Admitted with ventral hernia with gangrene. Underwent open ventral hernia repair 6/6. Additional findings throughout admission of hyperkalemia.  6/9: KUB impression: Multiple dilated loops of small bowel. Consistent with ileus versus partial small bowel obstruction recommend follow-up.  6/12 KUB impression: Postoperative ileus.  Nutrition Interval:  6/9: Hyperkalemia resolved, episode of emesis overnight. Pt made NPO, NG placed to suction KUB confirms placement. 6/11: NG removed, patient with large episode of emesis overnight (~900 cc)  6/12: NG replaced, RD consulted for PPN. Dilute PPN initiated with lipids 3x per week.     Visited with patient in room. Vascular access placed 2 PIVs this AM (6/12). Reviewed process of diet advancement with patient. Discussed limiting K/Phos in PN due to HD status. Some K/phos provisions provided 2/2 moderate refeeding risk.     Abdominal Status (last documented by nursing):   Last BM (including prior to admit): 06/12/24, GI Symptoms: Distention, Nausea, Vomiting   Recorded I/O x 24 hours: 1.6 L UOP; 1.35 L NG OP  Pertinent Medications: dulcolax, pepcid, reglan, glycolax (last admin 6/10), NaBicarb 1300 mg BID  Lokelma 5g x 3 6/7   Continuous: NA  IVF: LR at 50 cc/hr  Electrolyte Replacement:  NA  Pertinent administered PRN: zofran (last admin 6/9), compazine (last admin 6/12)  Pertinent Labs:   Lab Results   Component Value Date/Time     06/11/2024 08:25 AM    K 4.7 06/11/2024 08:25 AM     06/11/2024 08:25 AM    CO2 21 06/11/2024 08:25 AM    BUN 42 06/11/2024 08:25 AM    CREATININE 4.99 06/11/2024 08:25 AM    GLUCOSE 116 06/11/2024 08:25 AM    CALCIUM 9.0 06/11/2024

## 2024-06-12 NOTE — PROGRESS NOTES
Patient NGT removed on 6/11. Pt c/o nausea @ 2100, gave compazine.     0030: Pt vomited 700ml brown emesis. Notified Kim Frommel NP, no new orders.     0315: Pt vomited 200ml brown emesis. Notified Kim Frommel NP, no new orders.

## 2024-06-12 NOTE — PROGRESS NOTES
Subjective:   Daily Progress Note: 6/12/2024 10:40 AM    Seen and examined in bed. Resting comfortably    Comfortable  No CP No SOB  + Abd pain  MS -      Current Facility-Administered Medications   Medication Dose Route Frequency    bisacodyl (DULCOLAX) suppository 10 mg  10 mg Rectal Daily    hydrALAZINE (APRESOLINE) injection 10 mg  10 mg IntraVENous Q6H PRN    metoclopramide (REGLAN) injection 5 mg  5 mg IntraVENous Q6H    sodium bicarbonate tablet 1,300 mg  1,300 mg Oral BID    polyethylene glycol (GLYCOLAX) packet 17 g  17 g Oral Daily    bisacodyl (DULCOLAX) suppository 10 mg  10 mg Rectal Daily PRN    lactated ringers IV soln infusion   IntraVENous Continuous    amLODIPine (NORVASC) tablet 10 mg  10 mg Oral Daily    fluticasone (FLONASE) 50 MCG/ACT nasal spray 1 spray  1 spray Each Nostril Daily    cetirizine (ZYRTEC) tablet 5 mg  5 mg Oral Daily    ketotifen fumarate (ZADITOR) 0.035 % ophthalmic solution 1 drop  1 drop Both Eyes BID    sodium chloride flush 0.9 % injection 5-40 mL  5-40 mL IntraVENous 2 times per day    sodium chloride flush 0.9 % injection 5-40 mL  5-40 mL IntraVENous PRN    0.9 % sodium chloride infusion   IntraVENous PRN    potassium chloride 10 mEq/100 mL IVPB (Peripheral Line)  10 mEq IntraVENous PRN    magnesium sulfate 1000 mg in dextrose 5% 100 mL IVPB  1,000 mg IntraVENous PRN    prochlorperazine (COMPAZINE) tablet 10 mg  10 mg Oral Q6H PRN    Or    prochlorperazine (COMPAZINE) injection 10 mg  10 mg IntraVENous Q6H PRN    heparin (porcine) injection 5,000 Units  5,000 Units SubCUTAneous BID    simethicone (MYLICON) chewable tablet 80 mg  80 mg Oral Q6H PRN    HYDROmorphone (DILAUDID) injection 0.5 mg  0.5 mg IntraVENous Q3H PRN    HYDROmorphone (DILAUDID) injection 1 mg  1 mg IntraVENous Q3H PRN    oxyCODONE (ROXICODONE) immediate release tablet 5 mg  5 mg Oral Q4H PRN    oxyCODONE (ROXICODONE) immediate release tablet 10 mg  10 mg Oral Q4H PRN    famotidine (PEPCID) tablet 20 mg   20 mg Oral Daily    Or    famotidine (PEPCID) 20 mg in sodium chloride (PF) 0.9 % 10 mL injection  20 mg IntraVENous Daily         Objective:     BP (!) 145/69   Pulse 97   Temp 97.9 °F (36.6 °C) (Oral)   Resp 17   Ht 1.651 m (5' 5\")   Wt 117.9 kg (259 lb 14.4 oz)   SpO2 94%   BMI 43.25 kg/m²         Temp (24hrs), Av.5 °F (36.9 °C), Min:97.3 °F (36.3 °C), Max:99.5 °F (37.5 °C)       07 - 1900  In: -   Out: 200   06/10 1901 -  07  In: 1831.1 [P.O.:340; I.V.:1355.8]  Out: 4900 [Urine:2350]      BP (!) 145/69   Pulse 97   Temp 97.9 °F (36.6 °C) (Oral)   Resp 17   Ht 1.651 m (5' 5\")   Wt 117.9 kg (259 lb 14.4 oz)   SpO2 94%   BMI 43.25 kg/m²   Head: Normocephalic, without obvious abnormality  Neck: no JVD  Lungs: clear to auscultation bilaterally  Heart: regular rate and rhythm  Abdomen: distended, diffusely tender  Extremities: no edema          Data Review    Recent Results (from the past 48 hour(s))   POCT Glucose    Collection Time: 06/10/24  7:15 PM   Result Value Ref Range    POC Glucose 117 (H) 65 - 100 mg/dL    Performed by: Lilia    CBC    Collection Time: 24  8:25 AM   Result Value Ref Range    WBC 7.9 4.3 - 11.1 K/uL    RBC 3.32 (L) 4.05 - 5.2 M/uL    Hemoglobin 8.9 (L) 11.7 - 15.4 g/dL    Hematocrit 29.2 (L) 35.8 - 46.3 %    MCV 88.0 82 - 102 FL    MCH 26.8 26.1 - 32.9 PG    MCHC 30.5 (L) 31.4 - 35.0 g/dL    RDW 15.0 (H) 11.9 - 14.6 %    Platelets 232 150 - 450 K/uL    MPV 10.6 9.4 - 12.3 FL    nRBC 0.00 0.0 - 0.2 K/uL   Basic Metabolic Panel w/ Reflex to MG    Collection Time: 24  8:25 AM   Result Value Ref Range    Sodium 143 136 - 145 mmol/L    Potassium 4.7 3.5 - 5.1 mmol/L    Chloride 107 98 - 107 mmol/L    CO2 21 20 - 28 mmol/L    Anion Gap 15 9 - 18 mmol/L    Glucose 116 (H) 70 - 99 mg/dL    BUN 42 (H) 8 - 23 MG/DL    Creatinine 4.99 (H) 0.60 - 1.10 MG/DL    Est, Glom Filt Rate 8 (L) >60 ml/min/1.73m2    Calcium 9.0 8.8 - 10.2 MG/DL

## 2024-06-12 NOTE — PROGRESS NOTES
END OF SHIFT NOTE:    INTAKE/OUTPUT  06/11 0701 - 06/12 0700  In: 1568.5 [P.O.:340; I.V.:1123.2]  Out: 2950 [Urine:1600]  Voiding: Yes  Catheter: Yes - external  Drain:   Closed/Suction Drain Abdomen (Active)   Site Description Clean, dry & intact 06/12/24 0710   Dressing Status Clean, dry & intact 06/12/24 0710   Drainage Appearance Serosanguinous 06/12/24 0710   Drain Status Compressed;To bulb suction 06/12/24 0710   Output (ml) 0 ml 06/12/24 0710       NG/OG/NJ/NE Tube Nasogastric 16 fr Right nostril (Active)   Surrounding Skin Clean, dry & intact 06/12/24 1205   Securement device Adhesive based weber 06/12/24 1205   Status Suction-low intermittent 06/12/24 1205   Placement Verified X-Ray (Initial) 06/12/24 1205   NG/OG/NJ/NE External Measurement (cm) 60 cm 06/12/24 1205   Drainage Appearance Bile 06/12/24 1205   Output (mL) 700 ml 06/12/24 1818   Action Taken Tubing changed 06/12/24 1205       External Urinary Catheter (Active)   Site Assessment Clean,dry & intact 06/12/24 1205   Placement Replaced 06/12/24 1205   Securement Method Securing device (Describe) 06/12/24 1205   Catheter Care Catheter/Wick replaced;Suction Canister/Tubing changed 06/12/24 1205   Perineal Care Yes 06/12/24 1205   Suction 40 mmgHg continuous 06/12/24 1205   Urine Color Bre 06/12/24 1205   Urine Appearance Clear 06/12/24 1205   Output (mL) 20 mL 06/12/24 1205               Flatus: Patient does have flatus present.    Stool: 1 occurrences.    Characteristics:  Stool Appearance: Hard  Stool Color: Tan, Brown  Stool Amount: Large  Stool Assessment  Incontinence: No  Stool Appearance: Hard  Stool Color: Tan, Brown  Stool Amount: Large  Stool Source: Rectum  Last BM (including prior to admit): 06/12/24    Emesis: 0 occurrences.    Characteristics:   Emesis Appearance: Brown    VITAL SIGNS  Patient Vitals for the past 12 hrs:   Temp Pulse Resp BP SpO2   06/12/24 1521 98.9 °F (37.2 °C) 99 18 (!) 165/76 95 %   06/12/24 0711 97.9 °F (36.6 °C)

## 2024-06-13 ENCOUNTER — APPOINTMENT (OUTPATIENT)
Dept: GENERAL RADIOLOGY | Age: 79
DRG: 353 | End: 2024-06-13
Attending: SURGERY
Payer: MEDICARE

## 2024-06-13 LAB
ALBUMIN SERPL-MCNC: 2.6 G/DL (ref 3.2–4.6)
ALBUMIN/GLOB SERPL: 0.5 (ref 1–1.9)
ALP SERPL-CCNC: 85 U/L (ref 35–104)
ALT SERPL-CCNC: 20 U/L (ref 12–65)
ANION GAP SERPL CALC-SCNC: 15 MMOL/L (ref 9–18)
AST SERPL-CCNC: 30 U/L (ref 15–37)
BASOPHILS # BLD: 0 K/UL (ref 0–0.2)
BASOPHILS NFR BLD: 0 % (ref 0–2)
BILIRUB DIRECT SERPL-MCNC: 0.5 MG/DL (ref 0–0.4)
BILIRUB SERPL-MCNC: 0.8 MG/DL (ref 0–1.2)
BUN SERPL-MCNC: 50 MG/DL (ref 8–23)
CALCIUM SERPL-MCNC: 9.6 MG/DL (ref 8.8–10.2)
CHLORIDE SERPL-SCNC: 106 MMOL/L (ref 98–107)
CO2 SERPL-SCNC: 21 MMOL/L (ref 20–28)
CREAT SERPL-MCNC: 5.2 MG/DL (ref 0.6–1.1)
DIFFERENTIAL METHOD BLD: ABNORMAL
EOSINOPHIL # BLD: 0.5 K/UL (ref 0–0.8)
EOSINOPHIL NFR BLD: 5 % (ref 0.5–7.8)
ERYTHROCYTE [DISTWIDTH] IN BLOOD BY AUTOMATED COUNT: 14.9 % (ref 11.9–14.6)
GLOBULIN SER CALC-MCNC: 5.4 G/DL (ref 2.3–3.5)
GLUCOSE SERPL-MCNC: 125 MG/DL (ref 70–99)
HCT VFR BLD AUTO: 31 % (ref 35.8–46.3)
HGB BLD-MCNC: 9.3 G/DL (ref 11.7–15.4)
IMM GRANULOCYTES # BLD AUTO: 0.5 K/UL (ref 0–0.5)
IMM GRANULOCYTES NFR BLD AUTO: 5 % (ref 0–5)
LYMPHOCYTES # BLD: 1.8 K/UL (ref 0.5–4.6)
LYMPHOCYTES NFR BLD: 20 % (ref 13–44)
MAGNESIUM SERPL-MCNC: 2.3 MG/DL (ref 1.8–2.4)
MCH RBC QN AUTO: 27.3 PG (ref 26.1–32.9)
MCHC RBC AUTO-ENTMCNC: 30 G/DL (ref 31.4–35)
MCV RBC AUTO: 90.9 FL (ref 82–102)
MONOCYTES # BLD: 0.8 K/UL (ref 0.1–1.3)
MONOCYTES NFR BLD: 9 % (ref 4–12)
NEUTS SEG # BLD: 5.6 K/UL (ref 1.7–8.2)
NEUTS SEG NFR BLD: 61 % (ref 43–78)
NRBC # BLD: 0 K/UL (ref 0–0.2)
PHOSPHATE SERPL-MCNC: 5 MG/DL (ref 2.5–4.5)
PLATELET # BLD AUTO: 289 K/UL (ref 150–450)
PLATELET COMMENT: ADEQUATE
PMV BLD AUTO: 10.7 FL (ref 9.4–12.3)
POTASSIUM SERPL-SCNC: 3.8 MMOL/L (ref 3.5–5.1)
PROT SERPL-MCNC: 8 G/DL (ref 6.3–8.2)
RBC # BLD AUTO: 3.41 M/UL (ref 4.05–5.2)
RBC MORPH BLD: ABNORMAL
RBC MORPH BLD: ABNORMAL
SODIUM SERPL-SCNC: 142 MMOL/L (ref 136–145)
TRIGL SERPL-MCNC: 311 MG/DL (ref 0–150)
WBC # BLD AUTO: 9.2 K/UL (ref 4.3–11.1)
WBC MORPH BLD: ABNORMAL

## 2024-06-13 PROCEDURE — 96372 THER/PROPH/DIAG INJ SC/IM: CPT

## 2024-06-13 PROCEDURE — 6370000000 HC RX 637 (ALT 250 FOR IP): Performed by: INTERNAL MEDICINE

## 2024-06-13 PROCEDURE — 84478 ASSAY OF TRIGLYCERIDES: CPT

## 2024-06-13 PROCEDURE — 85025 COMPLETE CBC W/AUTO DIFF WBC: CPT

## 2024-06-13 PROCEDURE — 80076 HEPATIC FUNCTION PANEL: CPT

## 2024-06-13 PROCEDURE — 96366 THER/PROPH/DIAG IV INF ADDON: CPT

## 2024-06-13 PROCEDURE — 1100000000 HC RM PRIVATE

## 2024-06-13 PROCEDURE — 2580000003 HC RX 258: Performed by: SURGERY

## 2024-06-13 PROCEDURE — 84100 ASSAY OF PHOSPHORUS: CPT

## 2024-06-13 PROCEDURE — 6370000000 HC RX 637 (ALT 250 FOR IP): Performed by: SURGERY

## 2024-06-13 PROCEDURE — 36415 COLL VENOUS BLD VENIPUNCTURE: CPT

## 2024-06-13 PROCEDURE — 80048 BASIC METABOLIC PNL TOTAL CA: CPT

## 2024-06-13 PROCEDURE — G0378 HOSPITAL OBSERVATION PER HR: HCPCS

## 2024-06-13 PROCEDURE — 6360000002 HC RX W HCPCS: Performed by: SURGERY

## 2024-06-13 PROCEDURE — 2500000003 HC RX 250 WO HCPCS: Performed by: SURGERY

## 2024-06-13 PROCEDURE — 3E0336Z INTRODUCTION OF NUTRITIONAL SUBSTANCE INTO PERIPHERAL VEIN, PERCUTANEOUS APPROACH: ICD-10-PCS | Performed by: SURGERY

## 2024-06-13 PROCEDURE — 6360000004 HC RX CONTRAST MEDICATION

## 2024-06-13 PROCEDURE — 6360000002 HC RX W HCPCS

## 2024-06-13 PROCEDURE — 83735 ASSAY OF MAGNESIUM: CPT

## 2024-06-13 PROCEDURE — 96368 THER/DIAG CONCURRENT INF: CPT

## 2024-06-13 PROCEDURE — 74250 X-RAY XM SM INT 1CNTRST STD: CPT

## 2024-06-13 PROCEDURE — 96376 TX/PRO/DX INJ SAME DRUG ADON: CPT

## 2024-06-13 RX ORDER — METOCLOPRAMIDE HYDROCHLORIDE 5 MG/ML
5 INJECTION INTRAMUSCULAR; INTRAVENOUS EVERY 6 HOURS
Status: COMPLETED | OUTPATIENT
Start: 2024-06-13 | End: 2024-06-16

## 2024-06-13 RX ADMIN — FAMOTIDINE 20 MG: 10 INJECTION, SOLUTION INTRAVENOUS at 08:31

## 2024-06-13 RX ADMIN — KETOTIFEN FUMARATE 1 DROP: 0.25 SOLUTION/ DROPS OPHTHALMIC at 21:27

## 2024-06-13 RX ADMIN — AMLODIPINE BESYLATE 10 MG: 10 TABLET ORAL at 08:30

## 2024-06-13 RX ADMIN — SODIUM ACETATE: 164 INJECTION, SOLUTION, CONCENTRATE INTRAVENOUS at 17:40

## 2024-06-13 RX ADMIN — METOCLOPRAMIDE 5 MG: 5 INJECTION, SOLUTION INTRAMUSCULAR; INTRAVENOUS at 05:37

## 2024-06-13 RX ADMIN — METOCLOPRAMIDE HYDROCHLORIDE 5 MG: 5 INJECTION INTRAMUSCULAR; INTRAVENOUS at 22:04

## 2024-06-13 RX ADMIN — SODIUM BICARBONATE 650 MG TABLET 1300 MG: at 21:20

## 2024-06-13 RX ADMIN — DIATRIZOATE MEGLUMINE AND DIATRIZOATE SODIUM 180 ML: 660; 100 LIQUID ORAL; RECTAL at 11:04

## 2024-06-13 RX ADMIN — I.V. FAT EMULSION 250 ML: 20 EMULSION INTRAVENOUS at 17:41

## 2024-06-13 RX ADMIN — HEPARIN SODIUM 5000 UNITS: 5000 INJECTION INTRAVENOUS; SUBCUTANEOUS at 21:20

## 2024-06-13 RX ADMIN — METOCLOPRAMIDE HYDROCHLORIDE 5 MG: 5 INJECTION INTRAMUSCULAR; INTRAVENOUS at 16:43

## 2024-06-13 RX ADMIN — HEPARIN SODIUM 5000 UNITS: 5000 INJECTION INTRAVENOUS; SUBCUTANEOUS at 08:31

## 2024-06-13 ASSESSMENT — PAIN SCALES - GENERAL
PAINLEVEL_OUTOF10: 0
PAINLEVEL_OUTOF10: 0

## 2024-06-13 NOTE — PROGRESS NOTES
Subjective:   Daily Progress Note: 6/13/2024 12:57 PM    Seen and examined in bed. Resting comfortably    Comfortable  No CP No SOB  + Abd pain  MS -      Current Facility-Administered Medications   Medication Dose Route Frequency    PN-Adult Premix 4.25/5 - Peripheral Line   IntraVENous Continuous TPN    fat emulsion (INTRALIPID/NUTRILIPID) 20 % infusion 250 mL  250 mL IntraVENous Daily    diatrizoate meglumine-sodium (GASTROGRAFIN) 66-10 % solution 180 mL  180 mL Per NG tube ONCE PRN    bisacodyl (DULCOLAX) suppository 10 mg  10 mg Rectal Daily    PN-Adult Premix 4.25/5 - Peripheral Line   IntraVENous Continuous TPN    hydrALAZINE (APRESOLINE) injection 10 mg  10 mg IntraVENous Q6H PRN    metoclopramide (REGLAN) injection 5 mg  5 mg IntraVENous Q6H    sodium bicarbonate tablet 1,300 mg  1,300 mg Oral BID    polyethylene glycol (GLYCOLAX) packet 17 g  17 g Oral Daily    bisacodyl (DULCOLAX) suppository 10 mg  10 mg Rectal Daily PRN    amLODIPine (NORVASC) tablet 10 mg  10 mg Oral Daily    fluticasone (FLONASE) 50 MCG/ACT nasal spray 1 spray  1 spray Each Nostril Daily    cetirizine (ZYRTEC) tablet 5 mg  5 mg Oral Daily    ketotifen fumarate (ZADITOR) 0.035 % ophthalmic solution 1 drop  1 drop Both Eyes BID    sodium chloride flush 0.9 % injection 5-40 mL  5-40 mL IntraVENous 2 times per day    sodium chloride flush 0.9 % injection 5-40 mL  5-40 mL IntraVENous PRN    0.9 % sodium chloride infusion   IntraVENous PRN    potassium chloride 10 mEq/100 mL IVPB (Peripheral Line)  10 mEq IntraVENous PRN    magnesium sulfate 1000 mg in dextrose 5% 100 mL IVPB  1,000 mg IntraVENous PRN    prochlorperazine (COMPAZINE) tablet 10 mg  10 mg Oral Q6H PRN    Or    prochlorperazine (COMPAZINE) injection 10 mg  10 mg IntraVENous Q6H PRN    heparin (porcine) injection 5,000 Units  5,000 Units SubCUTAneous BID    simethicone (MYLICON) chewable tablet 80 mg  80 mg Oral Q6H PRN    HYDROmorphone (DILAUDID) injection 0.5 mg  0.5 mg

## 2024-06-13 NOTE — PROGRESS NOTES
END OF SHIFT NOTE:    INTAKE/OUTPUT  06/12 0701 - 06/13 0700  In: -   Out: 2620 [Urine:370]  Voiding: Yes  Catheter: No  Drain:   Closed/Suction Drain Abdomen (Active)   Site Description Clean, dry & intact 06/13/24 0710   Dressing Status Clean, dry & intact 06/13/24 0710   Drainage Appearance Serosanguinous 06/13/24 0710   Drain Status Compressed;To bulb suction 06/13/24 0710   Output (ml) 0 ml 06/13/24 0710       NG/OG/NJ/NE Tube Nasogastric 16 fr Right nostril (Active)   Surrounding Skin Clean, dry & intact 06/13/24 0710   Securement device Adhesive based weber 06/13/24 0710   Status Suction-low intermittent 06/13/24 0710   Placement Verified X-Ray (Initial) 06/13/24 0710   NG/OG/NJ/NE External Measurement (cm) 60 cm 06/13/24 0710   Drainage Appearance Bile 06/13/24 0710   Output (mL) 0 ml 06/13/24 0710   Action Taken Tubing changed 06/13/24 0630       External Urinary Catheter (Active)   Site Assessment Clean,dry & intact 06/13/24 0719   Placement Replaced 06/12/24 1205   Securement Method Securing device (Describe) 06/12/24 1205   Catheter Care Catheter/Wick replaced;Suction Canister/Tubing changed 06/12/24 1205   Perineal Care Yes 06/12/24 1205   Suction 40 mmgHg continuous 06/12/24 1205   Urine Color Bre 06/12/24 1205   Urine Appearance Clear 06/12/24 1205   Output (mL) 200 mL 06/13/24 0230               Flatus: Patient does have flatus present.    Stool:  0 occurrences.    Characteristics:  Stool Appearance: Hard  Stool Color: Tan, Brown  Stool Amount: Large  Stool Assessment  Incontinence: No  Stool Appearance: Hard  Stool Color: Tan, Brown  Stool Amount: Large  Stool Source: Rectum  Last BM (including prior to admit): 06/12/24    Emesis: 0 occurrences.    Characteristics:   Emesis Appearance: Brown    VITAL SIGNS  Patient Vitals for the past 12 hrs:   Temp Pulse Resp BP SpO2   06/13/24 0230 97.9 °F (36.6 °C) 89 17 (!) 151/70 97 %   06/12/24 2324 -- -- 18 -- --   06/12/24 2235 98.2 °F (36.8 °C) 93 17 (!)  165/75 96 %   06/12/24 2000 97.9 °F (36.6 °C) 95 16 (!) 139/57 95 %       Pain Assessment  Pain Level: 0 (06/13/24 0710)  Pain Location: Abdomen  Patient's Stated Pain Goal: 0 - No pain    Ambulating  No    Shift report given to oncoming nurse at the bedside.    Chantelle De La Garza RN

## 2024-06-13 NOTE — PROGRESS NOTES
Physical Therapy Note:    Attempted to see patient this PM for physical therapy treatment  session. Patient currently with tech receiving bed bath, also per nsg awaiting x-ray post. Will follow and re-attempt as schedule permits/patient available. Thank you.    LONDON ROSS, PT     Rehab Caseload Tracker

## 2024-06-13 NOTE — PLAN OF CARE
Problem: Pain  Goal: Verbalizes/displays adequate comfort level or baseline comfort level  Outcome: Progressing     Problem: Safety - Adult  Goal: Free from fall injury  Outcome: Progressing     Problem: Discharge Planning  Goal: Discharge to home or other facility with appropriate resources  Outcome: Progressing     Problem: ABCDS Injury Assessment  Goal: Absence of physical injury  Outcome: Progressing     Problem: Skin/Tissue Integrity  Goal: Absence of new skin breakdown  Description: 1.  Monitor for areas of redness and/or skin breakdown  2.  Assess vascular access sites hourly  3.  Every 4-6 hours minimum:  Change oxygen saturation probe site  4.  Every 4-6 hours:  If on nasal continuous positive airway pressure, respiratory therapy assess nares and determine need for appliance change or resting period.  Outcome: Progressing

## 2024-06-13 NOTE — PROGRESS NOTES
Comprehensive Nutrition Assessment    Type and Reason for Visit: Reassess  Malnutrition Screening Tool: Malnutrition Screen  Have you recently lost weight without trying?: 2 to 13 pounds (1 point)  Have you been eating poorly because of a decreased appetite?: Yes (1 point)  Malnutrition Screening Tool Score: 2  Consult for Parenteral Nutrition Management  (6/12 - Gen Surg)     Nutrition Recommendations/Plan:   Parenteral Nutrition:  Peripheral parenteral nutrition (Osmolarity 792)   Peripheral line infusion  Advance: Dex 5%, 4.25% AA 1.8 L (75ml/hr)   Change  250 ml 20% lipids daily  Average to provide: 1112 kcal/day (78% of needs), 82 grams of protein/day (109% of needs), 90 grams of CHO/day and 2050 ml of total volume/day.   Above regimen:  Unable to meet calorie goal and slight excess in protein provisions 2/2 PPN solution limitations  Electrolytes/L:   Sodium ( 15 meq NaAcetate and 35 meq NaCl), Potassium ( 5 meq KCl) Phosphorus ( 2.5 mmol KPO4), Calcium (4.5 meq), Magnesium 8 meq   ~1:1 Acetate:Chloride ratio due to CKD V with high NG OP  Additives per bag:   MVI, MTE  Thiamine 100 mg daily x 7 days (EOT 6/18)  Folic Acid 1 mg daily x 7 days (EOT 6/18)  Nutrition Related Medication Management:  Electrolyte Replacement Protocol PRN Deferred due to CrCl    IVF:  NA  Labs:   BMP daily  Mg daily x 7 days at initiation then MWF  Phos daily x 7 days at initiation then MWF    Triglyceride tomorrow then weekly  Hepatic Panel weekly on Thursday  POC Glucoses/SSI Not indicated     Malnutrition Assessment:  Malnutrition Status: At risk for malnutrition (Comment) (NPO/CLD due to altered GI function)    No lenard wasting    Nutrition Assessment:  Nutrition History: Visited with patient in room. Reports that she has been dealing with waxing and waning discomfort in her stomach over the past year. Typically eats 1 meal per day with snacks. Will drink Ensure and Boost at home to supplement her intake; but reports it \"runs right  (Calculated): 57 kg (125 lbs), 206.9 %  BMI Category Obese Class 3 (BMI 40.0 or greater)    Estimated Daily Nutrient Needs:  Energy (kcal/day): 8444-1392 (25-30 kcal/kg) (Kcal/kg Weight used: 57 kg Ideal  Protein (g/day): 63-74 (1.1-1.3 g/kg) Weight Used: (Ideal) 57 kg  Fluid (ml/day):   (1 ml/kcal)    Nutrition Diagnosis:   Inadequate oral intake related to altered GI function as evidenced by NPO or clear liquid status due to medical condition    Nutrition Interventions:   Food and/or Nutrient Delivery: Modify Parenteral Nutrition     Coordination of Nutrition Care: Continue to monitor while inpatient    Goals:   Previous Goal Met: Progressing toward Goal(s)  Active Goal: Tolerate nutrition support at goal rate (within 3 days)       Nutrition Monitoring and Evaluation:      Food/Nutrient Intake Outcomes: Parenteral Nutrition Intake/Tolerance  Physical Signs/Symptoms Outcomes: Biochemical Data, GI Status, Fluid Status or Edema, Weight    Discharge Planning:    Too soon to determine    Alicia Carias, RD   [phone]

## 2024-06-13 NOTE — PROGRESS NOTES
Admit Date: 2024    POD 7 Days Post-Op    Procedure:  Procedure(s):  OPEN VENTRAL HERNIA REPAIR W/ MESH    POD 2-patient vomited.  Negative flatus negative bowel movement  Subjective:     A/O x 4 with NAD noted.  Respirations even and unlabored on room air.  NGT 2250 mL green output past 24 H (denies nausea or vomiting while NGT in place).  Denies flatus after bowel movement 24. positive BM yesterday with suppository = small formed hard  Afebrile.  BP 130s-160s/50s-70s; otherwise, VSS.  denies abdominal pain.  Voiding-pure wick 350 mL plus unmeasurable urine occurrences UOP past 24 H.    Objective:       Vitals:    24 2324 24 0230 24 0543 24 0739   BP:  (!) 151/70  (!) 151/65   Pulse:  89  86   Resp: 18 17  18   Temp:  97.9 °F (36.6 °C)  97.5 °F (36.4 °C)   TempSrc:  Temporal  Oral   SpO2:  97%  96%   Weight:   116.3 kg (256 lb 6.4 oz)    Height:           Temp (24hrs), Av.9 °F (36.6 °C), Min:97.5 °F (36.4 °C), Max:98.2 °F (36.8 °C)  .  I&O reviewed as documented.  Abdominal drain with scant SS output past 24 H  Physical Exam  Constitutional:       General: She is not in acute distress.  HENT:      Mouth/Throat:      Mouth: Mucous membranes are dry.   Cardiovascular:      Rate and Rhythm: Normal rate and regular rhythm.      Pulses: Normal pulses.      Heart sounds: Normal heart sounds. No murmur heard.     No friction rub. No gallop.   Pulmonary:      Effort: Pulmonary effort is normal. No respiratory distress.      Breath sounds: Normal breath sounds.   Abdominal:      General: Bowel sounds are decreased.      Palpations: Abdomen is soft.   Genitourinary:     Comments: voiding  Musculoskeletal:         General: No swelling. Normal range of motion.      Cervical back: No rigidity.   Skin:     General: Skin is warm and dry.      Capillary Refill: Capillary refill takes less than 2 seconds.      Comments: Midline abdominal incision with no signs of infection.  Abdominal drain with

## 2024-06-13 NOTE — PROGRESS NOTES
END OF SHIFT NOTE:    INTAKE/OUTPUT  06/12 0701 - 06/13 0700  In: -   Out: 2620 [Urine:370]  Voiding: Yes  Catheter: No  Drain:   Closed/Suction Drain Abdomen (Active)   Site Description Clean, dry & intact 06/13/24 0710   Dressing Status Clean, dry & intact 06/13/24 0710   Drainage Appearance Serosanguinous 06/13/24 0710   Drain Status Compressed;To bulb suction 06/13/24 0710   Output (ml) 0 ml 06/13/24 0710       NG/OG/NJ/NE Tube Nasogastric 16 fr Right nostril (Active)   Surrounding Skin Clean, dry & intact 06/13/24 0710   Securement device Adhesive based weber 06/13/24 0710   Status Suction-low intermittent 06/13/24 0710   Placement Verified X-Ray (Initial) 06/13/24 0710   NG/OG/NJ/NE External Measurement (cm) 60 cm 06/13/24 0710   Drainage Appearance Bile 06/13/24 0710   Output (mL) 300 ml 06/13/24 1700   Action Taken Tubing changed 06/13/24 0630               Flatus: Patient does have flatus present.    Stool: 3 occurrences.    Characteristics:  Stool Appearance: Hard  Stool Color: Tan, Brown  Stool Amount: Large  Stool Assessment  Incontinence: No  Stool Appearance: Hard  Stool Color: Tan, Brown  Stool Amount: Large  Stool Source: Rectum  Last BM (including prior to admit): 06/12/24    Emesis: 0  occurrences.    Characteristics:   Emesis Appearance: Brown    VITAL SIGNS  Patient Vitals for the past 12 hrs:   Temp Pulse Resp BP SpO2   06/13/24 0739 97.5 °F (36.4 °C) 86 18 (!) 151/65 96 %       Pain Assessment  Pain Level: 0 (06/13/24 0710)  Pain Location: Abdomen  Patient's Stated Pain Goal: 0 - No pain    Ambulating  Yes     Shift report given to oncoming nurse at the bedside.    Darleen Ortega, RN

## 2024-06-14 LAB
ANION GAP SERPL CALC-SCNC: 16 MMOL/L (ref 9–18)
BASOPHILS # BLD: 0 K/UL (ref 0–0.2)
BASOPHILS NFR BLD: 0 % (ref 0–2)
BUN SERPL-MCNC: 69 MG/DL (ref 8–23)
CALCIUM SERPL-MCNC: 9.6 MG/DL (ref 8.8–10.2)
CHLORIDE SERPL-SCNC: 108 MMOL/L (ref 98–107)
CO2 SERPL-SCNC: 19 MMOL/L (ref 20–28)
CREAT SERPL-MCNC: 6.37 MG/DL (ref 0.6–1.1)
DIFFERENTIAL METHOD BLD: ABNORMAL
EOSINOPHIL # BLD: 0.4 K/UL (ref 0–0.8)
EOSINOPHIL NFR BLD: 3 % (ref 0.5–7.8)
ERYTHROCYTE [DISTWIDTH] IN BLOOD BY AUTOMATED COUNT: 15.1 % (ref 11.9–14.6)
GLUCOSE SERPL-MCNC: 138 MG/DL (ref 70–99)
HCT VFR BLD AUTO: 31 % (ref 35.8–46.3)
HGB BLD-MCNC: 9.3 G/DL (ref 11.7–15.4)
IMM GRANULOCYTES # BLD AUTO: 0.9 K/UL (ref 0–0.5)
IMM GRANULOCYTES NFR BLD AUTO: 7 % (ref 0–5)
LYMPHOCYTES # BLD: 1.9 K/UL (ref 0.5–4.6)
LYMPHOCYTES NFR BLD: 14 % (ref 13–44)
MAGNESIUM SERPL-MCNC: 2.8 MG/DL (ref 1.8–2.4)
MCH RBC QN AUTO: 26.9 PG (ref 26.1–32.9)
MCHC RBC AUTO-ENTMCNC: 30 G/DL (ref 31.4–35)
MCV RBC AUTO: 89.6 FL (ref 82–102)
MONOCYTES # BLD: 0.9 K/UL (ref 0.1–1.3)
MONOCYTES NFR BLD: 7 % (ref 4–12)
NEUTS SEG # BLD: 9.3 K/UL (ref 1.7–8.2)
NEUTS SEG NFR BLD: 69 % (ref 43–78)
NRBC # BLD: 0 K/UL (ref 0–0.2)
PHOSPHATE SERPL-MCNC: 5.3 MG/DL (ref 2.5–4.5)
PLATELET # BLD AUTO: 305 K/UL (ref 150–450)
PLATELET COMMENT: ADEQUATE
PMV BLD AUTO: 10.5 FL (ref 9.4–12.3)
POTASSIUM SERPL-SCNC: 3.8 MMOL/L (ref 3.5–5.1)
RBC # BLD AUTO: 3.46 M/UL (ref 4.05–5.2)
RBC MORPH BLD: ABNORMAL
SODIUM SERPL-SCNC: 143 MMOL/L (ref 136–145)
TRIGL SERPL-MCNC: 389 MG/DL (ref 0–150)
WBC # BLD AUTO: 13.4 K/UL (ref 4.3–11.1)
WBC MORPH BLD: ABNORMAL

## 2024-06-14 PROCEDURE — 84478 ASSAY OF TRIGLYCERIDES: CPT

## 2024-06-14 PROCEDURE — 80048 BASIC METABOLIC PNL TOTAL CA: CPT

## 2024-06-14 PROCEDURE — 97530 THERAPEUTIC ACTIVITIES: CPT

## 2024-06-14 PROCEDURE — 6370000000 HC RX 637 (ALT 250 FOR IP): Performed by: NURSE PRACTITIONER

## 2024-06-14 PROCEDURE — 6370000000 HC RX 637 (ALT 250 FOR IP): Performed by: SURGERY

## 2024-06-14 PROCEDURE — 1100000000 HC RM PRIVATE

## 2024-06-14 PROCEDURE — 83735 ASSAY OF MAGNESIUM: CPT

## 2024-06-14 PROCEDURE — 6360000002 HC RX W HCPCS

## 2024-06-14 PROCEDURE — 6370000000 HC RX 637 (ALT 250 FOR IP): Performed by: INTERNAL MEDICINE

## 2024-06-14 PROCEDURE — 36415 COLL VENOUS BLD VENIPUNCTURE: CPT

## 2024-06-14 PROCEDURE — 84100 ASSAY OF PHOSPHORUS: CPT

## 2024-06-14 PROCEDURE — 6360000002 HC RX W HCPCS: Performed by: SURGERY

## 2024-06-14 PROCEDURE — 2580000003 HC RX 258: Performed by: SURGERY

## 2024-06-14 PROCEDURE — 85025 COMPLETE CBC W/AUTO DIFF WBC: CPT

## 2024-06-14 PROCEDURE — 2500000003 HC RX 250 WO HCPCS: Performed by: SURGERY

## 2024-06-14 RX ADMIN — METOCLOPRAMIDE HYDROCHLORIDE 5 MG: 5 INJECTION INTRAMUSCULAR; INTRAVENOUS at 22:30

## 2024-06-14 RX ADMIN — HEPARIN SODIUM 5000 UNITS: 5000 INJECTION INTRAVENOUS; SUBCUTANEOUS at 09:11

## 2024-06-14 RX ADMIN — METOCLOPRAMIDE HYDROCHLORIDE 5 MG: 5 INJECTION INTRAMUSCULAR; INTRAVENOUS at 10:30

## 2024-06-14 RX ADMIN — CETIRIZINE HYDROCHLORIDE 5 MG: 10 TABLET, FILM COATED ORAL at 09:10

## 2024-06-14 RX ADMIN — I.V. FAT EMULSION 250 ML: 20 EMULSION INTRAVENOUS at 18:07

## 2024-06-14 RX ADMIN — CALCIUM GLUCONATE: 98 INJECTION, SOLUTION INTRAVENOUS at 18:00

## 2024-06-14 RX ADMIN — KETOTIFEN FUMARATE 1 DROP: 0.25 SOLUTION/ DROPS OPHTHALMIC at 22:39

## 2024-06-14 RX ADMIN — PROCHLORPERAZINE EDISYLATE 10 MG: 5 INJECTION INTRAMUSCULAR; INTRAVENOUS at 22:11

## 2024-06-14 RX ADMIN — POLYETHYLENE GLYCOL 3350 17 G: 17 POWDER, FOR SOLUTION ORAL at 09:10

## 2024-06-14 RX ADMIN — SODIUM BICARBONATE 650 MG TABLET 1300 MG: at 09:09

## 2024-06-14 RX ADMIN — FLUTICASONE PROPIONATE 1 SPRAY: 50 SPRAY, METERED NASAL at 09:33

## 2024-06-14 RX ADMIN — METOCLOPRAMIDE HYDROCHLORIDE 5 MG: 5 INJECTION INTRAMUSCULAR; INTRAVENOUS at 18:12

## 2024-06-14 RX ADMIN — AMLODIPINE BESYLATE 10 MG: 10 TABLET ORAL at 09:10

## 2024-06-14 RX ADMIN — METOCLOPRAMIDE HYDROCHLORIDE 5 MG: 5 INJECTION INTRAMUSCULAR; INTRAVENOUS at 04:10

## 2024-06-14 RX ADMIN — HEPARIN SODIUM 5000 UNITS: 5000 INJECTION INTRAVENOUS; SUBCUTANEOUS at 21:59

## 2024-06-14 RX ADMIN — KETOTIFEN FUMARATE 1 DROP: 0.25 SOLUTION/ DROPS OPHTHALMIC at 09:30

## 2024-06-14 RX ADMIN — SODIUM CHLORIDE, PRESERVATIVE FREE 10 ML: 5 INJECTION INTRAVENOUS at 09:30

## 2024-06-14 RX ADMIN — FAMOTIDINE 20 MG: 20 TABLET, FILM COATED ORAL at 09:10

## 2024-06-14 RX ADMIN — I.V. FAT EMULSION 250 ML: 20 EMULSION INTRAVENOUS at 09:29

## 2024-06-14 ASSESSMENT — PAIN SCALES - GENERAL
PAINLEVEL_OUTOF10: 0
PAINLEVEL_OUTOF10: 0

## 2024-06-14 NOTE — PROGRESS NOTES
Comprehensive Nutrition Assessment    Type and Reason for Visit: Reassess  Malnutrition Screening Tool: Malnutrition Screen  Have you recently lost weight without trying?: 2 to 13 pounds (1 point)  Have you been eating poorly because of a decreased appetite?: Yes (1 point)  Malnutrition Screening Tool Score: 2  Consult for Parenteral Nutrition Management  (6/12 - Gen Surg)     Nutrition Recommendations/Plan:   Parenteral Nutrition:  Peripheral parenteral nutrition (Osmolarity 782)   Peripheral line infusion  Advance: Dex 5%, 4.25% AA 2 L (85ml/hr)   Change  250 ml 20% lipids 3x/week  Average to provide: 894 kcal/day (63% of needs), 85 grams of protein/day (115% of needs), 90 grams of CHO/day and 2147 ml of total volume/day.   Above regimen:  Unable to meet calorie goal and slight excess in protein provisions 2/2 PPN solution limitations  If believe patient will need PN for 24-48 more hours, please consider central access to initiate central parenteral nutrition to better support calorie needs.   Electrolytes/L:   Sodium ( 20 meq NaAcetate and 30 meq NaCl), Potassium ( 10 meq KCl) Phosphorus (none), Calcium (4.5 meq), Magnesium 2 meq   ~1:1 Acetate:Chloride ratio due to CKD V with NG OP. Nephrology following.   Additives per bag:   MVI, MTE  Thiamine 100 mg daily x 7 days (EOT 6/18)  Folic Acid 1 mg daily x 7 days (EOT 6/18)  Nutrition Related Medication Management:  Electrolyte Replacement Protocol PRN Deferred due to CrCl    IVF:  NA  Labs:   BMP daily  Mg daily x 7 days at initiation then MWF  Phos daily x 7 days at initiation then MWF    Triglyceride weekly on Thursday  Hepatic Panel weekly on Thursday  POC Glucoses/SSI Not indicated  Ionized calcium in AM     Malnutrition Assessment:  Malnutrition Status: At risk for malnutrition (Comment) (NPO/CLD due to altered GI function)    No lenard wasting    Nutrition Assessment:  Nutrition History: Visited with patient in room. Reports that she has been dealing with  needs), 90 grams of CHO/day and 2050 ml of total volume/day.    Current Intake:   Average Meal Intake: NPO Average Supplements Intake: NPO      Anthropometric Measures:  Height: 165.1 cm (5' 5\")  Current Body Wt: 113.5 kg (250 lb 3.6 oz) (6/14), Weight source: Bed Scale  BMI: 41.6, Obese Class 3 (BMI 40.0 or greater)  Admission Body Weight: 117.3 kg (258 lb 9.6 oz) (6/6 - standing scale)  Ideal Body Weight (Kg) (Calculated): 57 kg (125 lbs), 206.9 %  BMI Category Obese Class 3 (BMI 40.0 or greater)    Estimated Daily Nutrient Needs:  Energy (kcal/day): 0264-5988 (25-30 kcal/kg) (Kcal/kg Weight used: 57 kg Ideal  Protein (g/day): 63-74 (1.1-1.3 g/kg) (adjusted based on s/p open ventral hernia repair + CKD) Weight Used: (Ideal) 57 kg  Fluid (ml/day):   (1 ml/kcal)    Nutrition Diagnosis:   Inadequate oral intake related to altered GI function as evidenced by NPO or clear liquid status due to medical condition    Nutrition Interventions:   Food and/or Nutrient Delivery: Modify Parenteral Nutrition     Coordination of Nutrition Care: Continue to monitor while inpatient    Goals:   Previous Goal Met: Progressing toward Goal(s)  Active Goal: Tolerate nutrition support at goal rate (within 3 days)       Nutrition Monitoring and Evaluation:      Food/Nutrient Intake Outcomes: Parenteral Nutrition Intake/Tolerance  Physical Signs/Symptoms Outcomes: Biochemical Data, GI Status, Fluid Status or Edema, Weight    Discharge Planning:    Too soon to determine    Alicia Carias, RD   [phone]

## 2024-06-14 NOTE — PROGRESS NOTES
ACUTE PHYSICAL THERAPY GOALS:   (Developed with and agreed upon by patient and/or caregiver.)    (1.)Ms. Ruvalcaba will move from supine to sit and sit to supine  in bed with MODIFIED INDEPENDENCE within 7 treatment day(s).    (2.)Ms. Ruvalcaba will transfer from bed to chair and chair to bed with MODIFIED INDEPENDENCE using the least restrictive device within 7 treatment day(s).    (3.)Ms. Ruvalcaba will ambulate with MODIFIED INDEPENDENCE for 500 feet with the least restrictive device within 7 treatment day(s)    PHYSICAL THERAPY: Daily Note PM   (Link to Caseload Tracking: PT Visit Days : 5  Time In/Out PT Charge Capture  Rehab Caseload Tracker  Orders    Triny Ruvalcaba is a 78 y.o. female   PRIMARY DIAGNOSIS: Ventral hernia without obstruction or gangrene  Ventral hernia without obstruction or gangrene [K43.9]  Morbid obesity (HCC) [E66.01]  Procedure(s) (LRB):  OPEN VENTRAL HERNIA REPAIR W/ MESH (N/A)  8 Days Post-Op  Inpatient: Payor: [employer] MEDICARE / Plan: [employer] GOLD PLUS HMO / Product Type: *No Product type* /     ASSESSMENT:     REHAB RECOMMENDATIONS:   Recommendation to date pending progress:  Setting:  Home Health Therapy    Equipment:    To Be Determined     ASSESSMENT:  Ms. Ruvalcaba is supine in bed and agreeable to therapy.  States that she needs to use the BSC.  Bd mobility is with min/mod assist.  Sitting balance good.  Sit to stand transfer to the BSC stood to be cleaned and then transferred to the recliner by taking a few steps. Patient is left in the recliner, positioned for comfort with needs within reach.  Good session and progress demonstrated.  Continue PT efforts.  HHPT at d/c pending progress.      SUBJECTIVE:   Ms. Ruvalcaba states, \"I need to use the bathroom please\"     Social/Functional Lives With: Son  Type of Home: House  Home Layout: One level  Home Access: Ramped entrance  Home Equipment: Rollator, Walker - Rolling (was not using prior to admission)  ADL Assistance: Independent  Ambulation  duration of hospital stay or until stated goals are met, whichever comes first.    TREATMENT:   TREATMENT:   Therapeutic Activity (25 Minutes): Therapeutic activity included Rolling, Supine to Sit, Sit to Supine, Scooting, Transfer Training, Ambulation on level ground, Sitting balance , Standing balance, and LE exercises to improve functional Activity tolerance, Balance, Coordination, Mobility, and Strength.    TREATMENT GRID:   Date:  6/11 Date:  6/12 Date:     Activity/Exercise Parameters Parameters Parameters   Heel raises 20x     Toe raises 20x     LAQ 10x ea     Ankle pumps  15    Quad set  15    Glut set  15              AFTER TREATMENT PRECAUTIONS: Bed/Chair Locked, Call light within reach, Chair, Needs within reach, and RN notified    INTERDISCIPLINARY COLLABORATION:  RN/ PCT and PT/ PTA    EDUCATION:  review POC and importance of mobility in the recovery process    TIME IN/OUT:  Time In: 1335  Time Out: 1400  Minutes: 25    Krista Michaud, PTA

## 2024-06-14 NOTE — PROGRESS NOTES
Subjective:   Daily Progress Note: 6/14/2024 10:11 AM    Seen and examined in bed. Resting comfortably    Comfortable  No CP No SOB  + Abd pain  MS -      Current Facility-Administered Medications   Medication Dose Route Frequency    PN-Adult Premix 4.25/5 - Peripheral Line   IntraVENous Continuous TPN    fat emulsion (INTRALIPID/NUTRILIPID) 20 % infusion 250 mL  250 mL IntraVENous Daily    metoclopramide (REGLAN) injection 5 mg  5 mg IntraVENous Q6H    hydrALAZINE (APRESOLINE) injection 10 mg  10 mg IntraVENous Q6H PRN    sodium bicarbonate tablet 1,300 mg  1,300 mg Oral BID    polyethylene glycol (GLYCOLAX) packet 17 g  17 g Oral Daily    bisacodyl (DULCOLAX) suppository 10 mg  10 mg Rectal Daily PRN    amLODIPine (NORVASC) tablet 10 mg  10 mg Oral Daily    fluticasone (FLONASE) 50 MCG/ACT nasal spray 1 spray  1 spray Each Nostril Daily    cetirizine (ZYRTEC) tablet 5 mg  5 mg Oral Daily    ketotifen fumarate (ZADITOR) 0.035 % ophthalmic solution 1 drop  1 drop Both Eyes BID    sodium chloride flush 0.9 % injection 5-40 mL  5-40 mL IntraVENous 2 times per day    sodium chloride flush 0.9 % injection 5-40 mL  5-40 mL IntraVENous PRN    0.9 % sodium chloride infusion   IntraVENous PRN    potassium chloride 10 mEq/100 mL IVPB (Peripheral Line)  10 mEq IntraVENous PRN    magnesium sulfate 1000 mg in dextrose 5% 100 mL IVPB  1,000 mg IntraVENous PRN    prochlorperazine (COMPAZINE) tablet 10 mg  10 mg Oral Q6H PRN    Or    prochlorperazine (COMPAZINE) injection 10 mg  10 mg IntraVENous Q6H PRN    heparin (porcine) injection 5,000 Units  5,000 Units SubCUTAneous BID    simethicone (MYLICON) chewable tablet 80 mg  80 mg Oral Q6H PRN    HYDROmorphone (DILAUDID) injection 0.5 mg  0.5 mg IntraVENous Q3H PRN    HYDROmorphone (DILAUDID) injection 1 mg  1 mg IntraVENous Q3H PRN    oxyCODONE (ROXICODONE) immediate release tablet 5 mg  5 mg Oral Q4H PRN    oxyCODONE (ROXICODONE) immediate release tablet 10 mg  10 mg Oral Q4H PRN

## 2024-06-14 NOTE — PROGRESS NOTES
General Surgery Progress Note    6/14/2024    Admit Date: 6/6/2024    Subjective:   Surgery     SBFT yesterday showed:    LINICAL HISTORY: Dilated small bowel.  Status post ventral hernia repair.     COMPARISON: Recent radiograph 1 day prior     TECHNIQUE: Gastrografin was administered via NG tube, and multiple sequential  radiographs of the abdomen were obtained.  No fluoroscopy performed.     FINDINGS:  radiograph of the abdomen demonstrates multiple midline  abdominal wall cutaneous staples.  Marked air dilatation of multiple small bowel  loops throughout the abdomen.  Bilateral hip replacements.  NG tube with tip in  the stomach.     Immediate image demonstrates normal contrast opacification of the stomach and a  few loops of dilated proximal jejunum.  No significant change on the 15-minute  image.  Oral contrast fills the dilated proximal small bowel on the 2-hour  15-minute image with no oral contrast in dilated air-filled small bowel in the  right lower quadrant.  No oral contrast identified in the colon.     On the 5-hour image, there is transit of contrast from the dilated proximal  small bowel into nondilated distal small bowel and colon.  Retention of contrast  in the dilated proximal small bowel and stomach.          IMPRESSION:  1.  No evidence of a complete small bowel obstruction.  2.  Dilatation of multiple proximal small bowel loops, with delayed transit time  to the colon.  This probably represents a postoperative ileus, although a  partial small bowel obstruction is not excluded.    The patient has had 4 bowel movements since the study. Her abdomen feels \"better\" today.     Objective:     BP (!) 173/76   Pulse 91   Temp 98.4 °F (36.9 °C) (Oral)   Resp 17   Ht 1.651 m (5' 5\")   Wt 113.5 kg (250 lb 3.2 oz) Comment: rd obtained weight  SpO2 92%   BMI 41.64 kg/m²       Intake/Output Summary (Last 24 hours) at 6/14/2024 1309  Last data filed at 6/14/2024 1043  Gross per 24 hour   Intake

## 2024-06-14 NOTE — PROGRESS NOTES
END OF SHIFT NOTE:    INTAKE/OUTPUT  06/13 0701 - 06/14 0700  In: 2743.7 [I.V.:907.3]  Out: 1225 [Urine:300; Drains:25]  Voiding: Yes  Catheter: No  Drain:   Closed/Suction Drain Abdomen (Active)   Site Description Clean, dry & intact 06/13/24 1945   Dressing Status Old drainage noted;Intact 06/13/24 1945   Drainage Appearance Serosanguinous 06/13/24 1945   Drain Status Compressed;To bulb suction 06/13/24 1945   Output (ml) 25 ml 06/13/24 1945       NG/OG/NJ/NE Tube Nasogastric 16 fr Right nostril (Active)   Surrounding Skin Clean, dry & intact 06/14/24 0136   Securement device Tape 06/14/24 0136   Status Suction-low intermittent 06/14/24 0136   Placement Verified Gastric Contents;External Catheter Length 06/14/24 0136   NG/OG/NJ/NE External Measurement (cm) 60 cm 06/14/24 0136   Drainage Appearance Brown 06/14/24 0136   Output (mL) 400 ml 06/14/24 0136   Action Taken Other (comment) 06/14/24 0136               Flatus: Patient does have flatus present.    Stool:  occurrences.    Characteristics:  Stool Appearance: Loose  Stool Color: Unable to assess (stool/urine mixed)  Stool Amount: Unable to assess (stool/urine mixed)  Stool Assessment  Incontinence: No  Stool Appearance: Loose  Stool Color: Unable to assess (stool/urine mixed)  Stool Amount: Unable to assess (stool/urine mixed)  Stool Source: Rectum  Last BM (including prior to admit): 06/14/24    Emesis:  occurrences.    Characteristics:   Emesis Appearance: Brown    VITAL SIGNS  Patient Vitals for the past 12 hrs:   Temp Pulse Resp BP SpO2   06/14/24 0244 97.5 °F (36.4 °C) 92 17 126/63 91 %   06/13/24 2317 98.2 °F (36.8 °C) 94 17 131/61 94 %   06/13/24 1904 98.2 °F (36.8 °C) 96 18 128/65 94 %       Pain Assessment  Pain Level: 0 (06/13/24 1945)  Pain Location: Abdomen  Patient's Stated Pain Goal: 0 - No pain    Ambulating  Yes in room with assistance    Shift report given to oncoming nurse at the bedside.    TALITA MEADOWS RN

## 2024-06-14 NOTE — CARE COORDINATION
CM reviewed chart.     Patient's discharge plan continues to be for patient to go home with [facility] services (PT, OT, and RN) when medically stable for discharge. Patient is aware and in agreement with this discharge plan.

## 2024-06-14 NOTE — PLAN OF CARE
Problem: Pain  Goal: Verbalizes/displays adequate comfort level or baseline comfort level  6/14/2024 1422 by Taylor Blanco RN  Outcome: Progressing  6/14/2024 0306 by Svetlana Chiu RN  Outcome: Progressing     Problem: Safety - Adult  Goal: Free from fall injury  6/14/2024 1422 by Taylor Blanco RN  Outcome: Progressing  Flowsheets (Taken 6/14/2024 0705)  Free From Fall Injury: Instruct family/caregiver on patient safety  6/14/2024 0306 by Svetlana Chiu RN  Outcome: Progressing     Problem: Discharge Planning  Goal: Discharge to home or other facility with appropriate resources  6/14/2024 1422 by Taylor Blanco RN  Outcome: Progressing  Flowsheets (Taken 6/14/2024 0705)  Discharge to home or other facility with appropriate resources:   Identify barriers to discharge with patient and caregiver   Identify discharge learning needs (meds, wound care, etc)   Arrange for needed discharge resources and transportation as appropriate  6/14/2024 0306 by Svetlana Chiu RN  Outcome: Progressing     Problem: ABCDS Injury Assessment  Goal: Absence of physical injury  6/14/2024 1422 by Taylor Blanco RN  Outcome: Progressing  Flowsheets (Taken 6/14/2024 0705)  Absence of Physical Injury: Implement safety measures based on patient assessment  6/14/2024 0306 by Svetlana Chiu RN  Outcome: Progressing     Problem: Skin/Tissue Integrity  Goal: Absence of new skin breakdown  Description: 1.  Monitor for areas of redness and/or skin breakdown  2.  Assess vascular access sites hourly  3.  Every 4-6 hours minimum:  Change oxygen saturation probe site  4.  Every 4-6 hours:  If on nasal continuous positive airway pressure, respiratory therapy assess nares and determine need for appliance change or resting period.  6/14/2024 1422 by Taylor Blanco RN  Outcome: Progressing  6/14/2024 0306 by Svetlana Chiu RN  Outcome: Progressing

## 2024-06-15 LAB
ANION GAP SERPL CALC-SCNC: 15 MMOL/L (ref 9–18)
BASOPHILS # BLD: 0.2 K/UL (ref 0–0.2)
BASOPHILS NFR BLD: 1 % (ref 0–2)
BUN SERPL-MCNC: 83 MG/DL (ref 8–23)
CA-I BLD-MCNC: 4.92 MG/DL (ref 4–5.2)
CALCIUM SERPL-MCNC: 9.3 MG/DL (ref 8.8–10.2)
CHLORIDE SERPL-SCNC: 103 MMOL/L (ref 98–107)
CO2 SERPL-SCNC: 18 MMOL/L (ref 20–28)
CREAT SERPL-MCNC: 7.22 MG/DL (ref 0.6–1.1)
DIFFERENTIAL METHOD BLD: ABNORMAL
EOSINOPHIL # BLD: 0.5 K/UL (ref 0–0.8)
EOSINOPHIL NFR BLD: 3 % (ref 0.5–7.8)
ERYTHROCYTE [DISTWIDTH] IN BLOOD BY AUTOMATED COUNT: 14.9 % (ref 11.9–14.6)
GLUCOSE SERPL-MCNC: 138 MG/DL (ref 70–99)
HCT VFR BLD AUTO: 30.8 % (ref 35.8–46.3)
HGB BLD-MCNC: 9.2 G/DL (ref 11.7–15.4)
IMM GRANULOCYTES # BLD AUTO: 1.4 K/UL (ref 0–0.5)
IMM GRANULOCYTES NFR BLD AUTO: 9 % (ref 0–5)
LYMPHOCYTES # BLD: 2.3 K/UL (ref 0.5–4.6)
LYMPHOCYTES NFR BLD: 15 % (ref 13–44)
MAGNESIUM SERPL-MCNC: 2.9 MG/DL (ref 1.8–2.4)
MCH RBC QN AUTO: 26.9 PG (ref 26.1–32.9)
MCHC RBC AUTO-ENTMCNC: 29.9 G/DL (ref 31.4–35)
MCV RBC AUTO: 90.1 FL (ref 82–102)
MONOCYTES # BLD: 0.9 K/UL (ref 0.1–1.3)
MONOCYTES NFR BLD: 6 % (ref 4–12)
NEUTS SEG # BLD: 9.7 K/UL (ref 1.7–8.2)
NEUTS SEG NFR BLD: 66 % (ref 43–78)
NRBC # BLD: 0.03 K/UL (ref 0–0.2)
PHOSPHATE SERPL-MCNC: 5.8 MG/DL (ref 2.5–4.5)
PLATELET # BLD AUTO: 323 K/UL (ref 150–450)
PLATELET COMMENT: ABNORMAL
PMV BLD AUTO: 10.8 FL (ref 9.4–12.3)
POTASSIUM SERPL-SCNC: 3.8 MMOL/L (ref 3.5–5.1)
RBC # BLD AUTO: 3.42 M/UL (ref 4.05–5.2)
RBC MORPH BLD: ABNORMAL
SODIUM SERPL-SCNC: 136 MMOL/L (ref 136–145)
WBC # BLD AUTO: 15 K/UL (ref 4.3–11.1)
WBC MORPH BLD: ABNORMAL

## 2024-06-15 PROCEDURE — 82330 ASSAY OF CALCIUM: CPT

## 2024-06-15 PROCEDURE — 84100 ASSAY OF PHOSPHORUS: CPT

## 2024-06-15 PROCEDURE — 36415 COLL VENOUS BLD VENIPUNCTURE: CPT

## 2024-06-15 PROCEDURE — 6370000000 HC RX 637 (ALT 250 FOR IP): Performed by: INTERNAL MEDICINE

## 2024-06-15 PROCEDURE — 6370000000 HC RX 637 (ALT 250 FOR IP): Performed by: SURGERY

## 2024-06-15 PROCEDURE — 6360000002 HC RX W HCPCS: Performed by: SURGERY

## 2024-06-15 PROCEDURE — 2580000003 HC RX 258: Performed by: SURGERY

## 2024-06-15 PROCEDURE — 6370000000 HC RX 637 (ALT 250 FOR IP): Performed by: NURSE PRACTITIONER

## 2024-06-15 PROCEDURE — 80048 BASIC METABOLIC PNL TOTAL CA: CPT

## 2024-06-15 PROCEDURE — 85025 COMPLETE CBC W/AUTO DIFF WBC: CPT

## 2024-06-15 PROCEDURE — 83735 ASSAY OF MAGNESIUM: CPT

## 2024-06-15 PROCEDURE — 1100000000 HC RM PRIVATE

## 2024-06-15 PROCEDURE — 2500000003 HC RX 250 WO HCPCS: Performed by: SURGERY

## 2024-06-15 PROCEDURE — 76937 US GUIDE VASCULAR ACCESS: CPT

## 2024-06-15 PROCEDURE — 6360000002 HC RX W HCPCS

## 2024-06-15 RX ADMIN — METOCLOPRAMIDE HYDROCHLORIDE 5 MG: 5 INJECTION INTRAMUSCULAR; INTRAVENOUS at 04:57

## 2024-06-15 RX ADMIN — SODIUM BICARBONATE 650 MG TABLET 1300 MG: at 21:23

## 2024-06-15 RX ADMIN — HEPARIN SODIUM 5000 UNITS: 5000 INJECTION INTRAVENOUS; SUBCUTANEOUS at 08:43

## 2024-06-15 RX ADMIN — BISACODYL 10 MG: 10 SUPPOSITORY RECTAL at 11:40

## 2024-06-15 RX ADMIN — METOCLOPRAMIDE HYDROCHLORIDE 5 MG: 5 INJECTION INTRAMUSCULAR; INTRAVENOUS at 16:29

## 2024-06-15 RX ADMIN — METOCLOPRAMIDE HYDROCHLORIDE 5 MG: 5 INJECTION INTRAMUSCULAR; INTRAVENOUS at 10:44

## 2024-06-15 RX ADMIN — CALCIUM GLUCONATE: 98 INJECTION, SOLUTION INTRAVENOUS at 18:01

## 2024-06-15 RX ADMIN — HEPARIN SODIUM 5000 UNITS: 5000 INJECTION INTRAVENOUS; SUBCUTANEOUS at 21:30

## 2024-06-15 RX ADMIN — SODIUM CHLORIDE, PRESERVATIVE FREE 10 ML: 5 INJECTION INTRAVENOUS at 08:44

## 2024-06-15 RX ADMIN — AMLODIPINE BESYLATE 10 MG: 10 TABLET ORAL at 08:42

## 2024-06-15 RX ADMIN — FAMOTIDINE 20 MG: 10 INJECTION, SOLUTION INTRAVENOUS at 08:42

## 2024-06-15 RX ADMIN — POLYETHYLENE GLYCOL 3350 17 G: 17 POWDER, FOR SOLUTION ORAL at 08:44

## 2024-06-15 RX ADMIN — SODIUM CHLORIDE, PRESERVATIVE FREE 10 ML: 5 INJECTION INTRAVENOUS at 21:32

## 2024-06-15 RX ADMIN — FLUTICASONE PROPIONATE 1 SPRAY: 50 SPRAY, METERED NASAL at 08:44

## 2024-06-15 RX ADMIN — KETOTIFEN FUMARATE 1 DROP: 0.25 SOLUTION/ DROPS OPHTHALMIC at 21:36

## 2024-06-15 RX ADMIN — METOCLOPRAMIDE HYDROCHLORIDE 5 MG: 5 INJECTION INTRAMUSCULAR; INTRAVENOUS at 21:30

## 2024-06-15 RX ADMIN — CETIRIZINE HYDROCHLORIDE 5 MG: 10 TABLET, FILM COATED ORAL at 08:42

## 2024-06-15 RX ADMIN — SODIUM BICARBONATE 650 MG TABLET 1300 MG: at 08:43

## 2024-06-15 RX ADMIN — KETOTIFEN FUMARATE 1 DROP: 0.25 SOLUTION/ DROPS OPHTHALMIC at 08:44

## 2024-06-15 RX ADMIN — PROCHLORPERAZINE EDISYLATE 10 MG: 5 INJECTION INTRAMUSCULAR; INTRAVENOUS at 22:20

## 2024-06-15 ASSESSMENT — PAIN SCALES - GENERAL: PAINLEVEL_OUTOF10: 0

## 2024-06-15 NOTE — PROGRESS NOTES
General Surgery Progress Note    6/15/2024    Admit Date: 6/6/2024    Subjective:   Surgery     The patient's creatinine has risen to 7.22. She refused dialysis.     The patient is passing flatus, but vomited again last night.     Objective:     BP (!) 152/55   Pulse 92   Temp 98.2 °F (36.8 °C) (Axillary)   Resp 20   Ht 1.651 m (5' 5\")   Wt 115.9 kg (255 lb 8 oz)   SpO2 90%   BMI 42.52 kg/m²       Intake/Output Summary (Last 24 hours) at 6/15/2024 0845  Last data filed at 6/14/2024 2200  Gross per 24 hour   Intake 680 ml   Output 750 ml   Net -70 ml        EXAM:  ABD soft, incisional tenderness, active BS's. Wound intact without recurrent hernias.        Data Review    Recent Results (from the past 24 hour(s))   Basic Metabolic Panel    Collection Time: 06/15/24  3:44 AM   Result Value Ref Range    Sodium 136 136 - 145 mmol/L    Potassium 3.8 3.5 - 5.1 mmol/L    Chloride 103 98 - 107 mmol/L    CO2 18 (L) 20 - 28 mmol/L    Anion Gap 15 9 - 18 mmol/L    Glucose 138 (H) 70 - 99 mg/dL    BUN 83 (H) 8 - 23 MG/DL    Creatinine 7.22 (H) 0.60 - 1.10 MG/DL    Est, Glom Filt Rate 5 (L) >60 ml/min/1.73m2    Calcium 9.3 8.8 - 10.2 MG/DL   Magnesium    Collection Time: 06/15/24  3:44 AM   Result Value Ref Range    Magnesium 2.9 (H) 1.8 - 2.4 mg/dL   Phosphorus    Collection Time: 06/15/24  3:44 AM   Result Value Ref Range    Phosphorus 5.8 (H) 2.5 - 4.5 MG/DL   CBC with Auto Differential    Collection Time: 06/15/24  3:44 AM   Result Value Ref Range    WBC 15.0 (H) 4.3 - 11.1 K/uL    RBC 3.42 (L) 4.05 - 5.2 M/uL    Hemoglobin 9.2 (L) 11.7 - 15.4 g/dL    Hematocrit 30.8 (L) 35.8 - 46.3 %    MCV 90.1 82 - 102 FL    MCH 26.9 26.1 - 32.9 PG    MCHC 29.9 (L) 31.4 - 35.0 g/dL    RDW 14.9 (H) 11.9 - 14.6 %    Platelets 323 150 - 450 K/uL    MPV 10.8 9.4 - 12.3 FL    nRBC 0.03 0.0 - 0.2 K/uL    Neutrophils % 66 43 - 78 %    Lymphocytes % 15 13 - 44 %    Monocytes % 6 4.0 - 12.0 %    Eosinophils % 3 0.5 - 7.8 %    Basophils % 1  0.0 - 2.0 %    Immature Granulocytes % 9 (H) 0.0 - 5.0 %    Neutrophils Absolute 9.7 (H) 1.7 - 8.2 K/UL    Lymphocytes Absolute 2.3 0.5 - 4.6 K/UL    Monocytes Absolute 0.9 0.1 - 1.3 K/UL    Eosinophils Absolute 0.5 0.0 - 0.8 K/UL    Basophils Absolute 0.2 0.0 - 0.2 K/UL    Immature Granulocytes Absolute 1.4 (H) 0.0 - 0.5 K/UL    RBC Comment SLIGHT  ANISOCYTOSIS + POIKILOCYTOSIS        RBC Comment OCCASIONAL  HYPOCHROMIA        RBC Comment OCCASIONAL  POLYCHROMASIA        WBC Comment Result Confirmed By Smear      Platelet Comment MANY      Differential Type AUTOMATED     Calcium, Ionized    Collection Time: 06/15/24  3:44 AM   Result Value Ref Range    Calcium, Ionized 4.92 4.0 - 5.2 mg/dL        Principal Problem:    Ventral hernia without obstruction or gangrene  Active Problems:    Morbid obesity (HCC)  Resolved Problems:    * No resolved hospital problems. *        Plan: 1. Needs to have HD  2. Nephrology follow up  3. Reglan  4. Clears  5. Miralax/Dulcolax  6. Zosyn  7. JULIANNE Crain MD.

## 2024-06-15 NOTE — PROGRESS NOTES
END OF SHIFT NOTE:    INTAKE/OUTPUT  06/14 0701 - 06/15 0700  In: 680 [P.O.:680]  Out: 750 [Urine:300]  Voiding: Yes  Catheter: Yes; external  Drain:   NG/OG/NJ/NE Tube Nasogastric 16 fr Right nostril (Active)   Surrounding Skin Clean, dry & intact 06/14/24 2200   Securement device Tape 06/14/24 2200   Status Suction-low intermittent 06/14/24 2200   Placement Verified External Catheter Length;Gastric Contents 06/14/24 2200   NG/OG/NJ/NE External Measurement (cm) 55 cm 06/14/24 2200   Drainage Appearance Green;Yellow 06/14/24 2200   Output (mL) 50 ml 06/14/24 2200   Action Taken Tubing changed 06/14/24 1200               Flatus: Patient does have flatus present.    Stool:  occurrences.    Characteristics:  Stool Appearance: Loose  Stool Color: Unable to assess (stool/urine mixed)  Stool Amount: Unable to assess (stool/urine mixed)  Stool Assessment  Incontinence: No  Stool Appearance: Loose  Stool Color: Unable to assess (stool/urine mixed)  Stool Amount: Unable to assess (stool/urine mixed)  Stool Source: Rectum  Last BM (including prior to admit): 06/14/24    Emesis:  occurrences.    Characteristics:   Emesis Appearance: Bilious    VITAL SIGNS  Patient Vitals for the past 12 hrs:   Temp Pulse Resp BP SpO2   06/15/24 0434 97.3 °F (36.3 °C) 85 18 (!) 152/66 92 %   06/14/24 2316 97.3 °F (36.3 °C) 94 18 (!) 150/65 93 %   06/14/24 2015 -- 81 -- (!) 154/75 --   06/14/24 1945 97.7 °F (36.5 °C) 90 19 (!) 163/68 92 %       Pain Assessment  Pain Level: 0 (06/14/24 1915)  Pain Location: Abdomen  Patient's Stated Pain Goal: 0 - No pain    Ambulating  No    Shift report given to oncoming nurse at the bedside.    TALITA MEADOWS RN

## 2024-06-15 NOTE — PROGRESS NOTES
Pt had 1 urine occurance @2300 and has not voided since. Bladder scanner shows 168 mL. Per the chart, pt is CKD5. Pt receiving PPN @85/hr and lipids @ 21/hr. Gen surg on call notified. No new orders received.

## 2024-06-15 NOTE — PROGRESS NOTES
Pt NGT clamped @1200 today and has tolerated CL diet throughout day.    2200: Pt vomitted copious amount of green/yellow emesis when taking night meds. Gen surgery on call notified. Orders received to connect NGT to LIS and make pt NPO.     2211: PRN Compazine given for nausea.

## 2024-06-15 NOTE — PLAN OF CARE
Problem: Pain  Goal: Verbalizes/displays adequate comfort level or baseline comfort level  6/15/2024 0328 by Svetlana Chiu RN  Outcome: Progressing  6/14/2024 1422 by Taylor Blanco RN  Outcome: Progressing     Problem: Safety - Adult  Goal: Free from fall injury  6/15/2024 0328 by Svetlana Chiu RN  Outcome: Progressing  6/14/2024 1422 by Taylor Blanco RN  Outcome: Progressing  Flowsheets (Taken 6/14/2024 0705)  Free From Fall Injury: Instruct family/caregiver on patient safety     Problem: Discharge Planning  Goal: Discharge to home or other facility with appropriate resources  6/15/2024 0328 by Svetlana Chiu RN  Outcome: Progressing  6/14/2024 1422 by Taylor Blanco RN  Outcome: Progressing  Flowsheets (Taken 6/14/2024 0705)  Discharge to home or other facility with appropriate resources:   Identify barriers to discharge with patient and caregiver   Identify discharge learning needs (meds, wound care, etc)   Arrange for needed discharge resources and transportation as appropriate     Problem: ABCDS Injury Assessment  Goal: Absence of physical injury  6/15/2024 0328 by Svetlana Chiu RN  Outcome: Progressing  6/14/2024 1422 by Taylor Blanco RN  Outcome: Progressing  Flowsheets (Taken 6/14/2024 0705)  Absence of Physical Injury: Implement safety measures based on patient assessment     Problem: Skin/Tissue Integrity  Goal: Absence of new skin breakdown  Description: 1.  Monitor for areas of redness and/or skin breakdown  2.  Assess vascular access sites hourly  3.  Every 4-6 hours minimum:  Change oxygen saturation probe site  4.  Every 4-6 hours:  If on nasal continuous positive airway pressure, respiratory therapy assess nares and determine need for appliance change or resting period.  6/15/2024 0328 by Svetlana Chiu RN  Outcome: Progressing  6/14/2024 1422 by Taylor Blanco RN  Outcome: Progressing

## 2024-06-15 NOTE — PROGRESS NOTES
Comprehensive Nutrition Assessment    Type and Reason for Visit: Reassess  Malnutrition Screening Tool: Malnutrition Screen  Have you recently lost weight without trying?: 2 to 13 pounds (1 point)  Have you been eating poorly because of a decreased appetite?: Yes (1 point)  Malnutrition Screening Tool Score: 2  Consult for Parenteral Nutrition Management  (6/12 - Gen Surg)     Nutrition Recommendations/Plan:   Parenteral Nutrition:  Peripheral parenteral nutrition (Osmolarity 848)   Peripheral line infusion  Advance: Dex 5%, 4.25% AA 2 L (85ml/hr)   Continue 250 ml 20% lipids 3x/week  Average to provide: 894 kcal/day (63% of needs), 85 grams of protein/day (115% of needs), 90 grams of CHO/day and 2147 ml of total volume/day.   Above regimen:  Unable to meet calorie goal and slight excess in protein provisions 2/2 PPN solution limitations  If believe patient will need PN for 24-48 more hours, please consider central access to initiate central parenteral nutrition to better support calorie needs.   Electrolytes/L:   Sodium ( 40 meq NaAcetate and 50 meq NaCl), Potassium ( 10 meq KCl) Phosphorus (none), Calcium (4.5 meq), Magnesium none  ~1:1 Acetate:Chloride ratio due to CKD V with NG OP. Nephrology following.   Additives per bag:   MVI, MTE  Thiamine 100 mg daily x 7 days (EOT 6/18)  Folic Acid 1 mg daily x 7 days (EOT 6/18)  Nutrition Related Medication Management:  Electrolyte Replacement Protocol PRN Deferred due to CrCl    IVF:  NA  Labs:   BMP daily  Mg daily x 7 days at initiation then MWF  Phos daily x 7 days at initiation then MWF    Triglyceride weekly on Thursday  Hepatic Panel weekly on Thursday  POC Glucoses/SSI Not indicated  Ionized calcium in AM     Malnutrition Assessment:  Malnutrition Status: At risk for malnutrition (Comment) (NPO/CLD due to altered GI function)    No lenard wasting    Nutrition Assessment:  Nutrition History: Visited with patient in room. Reports that she has been dealing with

## 2024-06-15 NOTE — PROGRESS NOTES
US Guided PIV access-    Ultrasound was used to find the vein which was compressible and without any ultrasound features of an artery or nerve bundle. Skin was cleaned and disinfected prior to IV puncture.  Under real-time ultrasound guidance peripheral access was obtained in the left forearm using 20 G 2.25\" Peripheral IV catheter after 1 attempt(s). Blood return was present and IV flushed without difficulty with no clinical signs of infiltration. IV dressing applied and no immediate complications noted. Patient tolerated the procedure well.        US Guided PIV access-    Ultrasound was used to find the vein which was compressible and without any ultrasound features of an artery or nerve bundle. Skin was cleaned and disinfected prior to IV puncture.  Under real-time ultrasound guidance peripheral access was obtained in the right forearm using 22 G 1.75\" Peripheral IV catheter after 1 attempt(s). Blood return was present and IV flushed without difficulty with no clinical signs of infiltration. IV dressing applied and no immediate complications noted. Patient tolerated the procedure well.

## 2024-06-15 NOTE — PROGRESS NOTES
Spoke with pt about elevated lab levels ie. Creatinine.  Asked pt about her feelings about dialysis.  Pt proceeded to say that her doctors have been talking with her for years about dialysis.  She said she had a lot to think about because she has a disabled son that she cares for.  Pt said she was going to speak with her family about it

## 2024-06-15 NOTE — PROGRESS NOTES
Pt has two IV sifes.  C/O pain at both sites.  PPN stopped.  Unsuccessful IV attempt.  Veins in arms are very deep.  Placed call and left message with [facility].  Awaiting new IV access.

## 2024-06-15 NOTE — PROGRESS NOTES
Pt with very poor PO intake.  Pt wants a milkshake.  Explained that she is on a clear liquid diet and the doctors usually want to make sure the pt can tolerate clear liquids before advancing to a full liquid diet.  Pt states she does not like the way the clear liquids taste.  No vomiting today.  Encouraged to try to drink the clear liquids.  Visitors at BS

## 2024-06-16 LAB
ANION GAP SERPL CALC-SCNC: 18 MMOL/L (ref 9–18)
BASOPHILS # BLD: 0.2 K/UL (ref 0–0.2)
BASOPHILS NFR BLD: 1 % (ref 0–2)
BUN SERPL-MCNC: 99 MG/DL (ref 8–23)
CALCIUM SERPL-MCNC: 9.4 MG/DL (ref 8.8–10.2)
CHLORIDE SERPL-SCNC: 102 MMOL/L (ref 98–107)
CO2 SERPL-SCNC: 16 MMOL/L (ref 20–28)
CREAT SERPL-MCNC: 8.27 MG/DL (ref 0.6–1.1)
DIFFERENTIAL METHOD BLD: ABNORMAL
EOSINOPHIL # BLD: 0.5 K/UL (ref 0–0.8)
EOSINOPHIL NFR BLD: 3 % (ref 0.5–7.8)
ERYTHROCYTE [DISTWIDTH] IN BLOOD BY AUTOMATED COUNT: 14.7 % (ref 11.9–14.6)
GLUCOSE SERPL-MCNC: 120 MG/DL (ref 70–99)
HCT VFR BLD AUTO: 31 % (ref 35.8–46.3)
HGB BLD-MCNC: 9.2 G/DL (ref 11.7–15.4)
IMM GRANULOCYTES # BLD AUTO: 1.5 K/UL (ref 0–0.5)
IMM GRANULOCYTES NFR BLD AUTO: 9 % (ref 0–5)
LYMPHOCYTES # BLD: 2.2 K/UL (ref 0.5–4.6)
LYMPHOCYTES NFR BLD: 13 % (ref 13–44)
MAGNESIUM SERPL-MCNC: 2.8 MG/DL (ref 1.8–2.4)
MCH RBC QN AUTO: 26.4 PG (ref 26.1–32.9)
MCHC RBC AUTO-ENTMCNC: 29.7 G/DL (ref 31.4–35)
MCV RBC AUTO: 89.1 FL (ref 82–102)
MONOCYTES # BLD: 1 K/UL (ref 0.1–1.3)
MONOCYTES NFR BLD: 6 % (ref 4–12)
NEUTS SEG # BLD: 11.5 K/UL (ref 1.7–8.2)
NEUTS SEG NFR BLD: 68 % (ref 43–78)
NRBC # BLD: 0.02 K/UL (ref 0–0.2)
PHOSPHATE SERPL-MCNC: 5.6 MG/DL (ref 2.5–4.5)
PLATELET # BLD AUTO: 321 K/UL (ref 150–450)
PLATELET COMMENT: ADEQUATE
PMV BLD AUTO: 10.8 FL (ref 9.4–12.3)
POTASSIUM SERPL-SCNC: 3.9 MMOL/L (ref 3.5–5.1)
RBC # BLD AUTO: 3.48 M/UL (ref 4.05–5.2)
RBC MORPH BLD: ABNORMAL
SODIUM SERPL-SCNC: 136 MMOL/L (ref 136–145)
WBC # BLD AUTO: 16.9 K/UL (ref 4.3–11.1)
WBC MORPH BLD: ABNORMAL

## 2024-06-16 PROCEDURE — 2580000003 HC RX 258: Performed by: SURGERY

## 2024-06-16 PROCEDURE — 84100 ASSAY OF PHOSPHORUS: CPT

## 2024-06-16 PROCEDURE — 6370000000 HC RX 637 (ALT 250 FOR IP): Performed by: SURGERY

## 2024-06-16 PROCEDURE — 36415 COLL VENOUS BLD VENIPUNCTURE: CPT

## 2024-06-16 PROCEDURE — 2500000003 HC RX 250 WO HCPCS: Performed by: SURGERY

## 2024-06-16 PROCEDURE — 83735 ASSAY OF MAGNESIUM: CPT

## 2024-06-16 PROCEDURE — 6360000002 HC RX W HCPCS

## 2024-06-16 PROCEDURE — 80048 BASIC METABOLIC PNL TOTAL CA: CPT

## 2024-06-16 PROCEDURE — 6360000002 HC RX W HCPCS: Performed by: SURGERY

## 2024-06-16 PROCEDURE — 6370000000 HC RX 637 (ALT 250 FOR IP): Performed by: INTERNAL MEDICINE

## 2024-06-16 PROCEDURE — 6360000002 HC RX W HCPCS: Performed by: NURSE PRACTITIONER

## 2024-06-16 PROCEDURE — 85025 COMPLETE CBC W/AUTO DIFF WBC: CPT

## 2024-06-16 PROCEDURE — 1100000000 HC RM PRIVATE

## 2024-06-16 PROCEDURE — 6370000000 HC RX 637 (ALT 250 FOR IP): Performed by: NURSE PRACTITIONER

## 2024-06-16 RX ORDER — ONDANSETRON 2 MG/ML
4 INJECTION INTRAMUSCULAR; INTRAVENOUS EVERY 6 HOURS PRN
Status: DISCONTINUED | OUTPATIENT
Start: 2024-06-16 | End: 2024-06-17 | Stop reason: SDUPTHER

## 2024-06-16 RX ORDER — POLYETHYLENE GLYCOL 3350 17 G/17G
17 POWDER, FOR SOLUTION ORAL DAILY
Status: DISCONTINUED | OUTPATIENT
Start: 2024-06-16 | End: 2024-06-20

## 2024-06-16 RX ORDER — BISACODYL 10 MG
10 SUPPOSITORY, RECTAL RECTAL DAILY
Status: DISCONTINUED | OUTPATIENT
Start: 2024-06-16 | End: 2024-06-26 | Stop reason: HOSPADM

## 2024-06-16 RX ADMIN — HEPARIN SODIUM 5000 UNITS: 5000 INJECTION INTRAVENOUS; SUBCUTANEOUS at 08:27

## 2024-06-16 RX ADMIN — SODIUM CHLORIDE, PRESERVATIVE FREE 10 ML: 5 INJECTION INTRAVENOUS at 21:57

## 2024-06-16 RX ADMIN — HEPARIN SODIUM 5000 UNITS: 5000 INJECTION INTRAVENOUS; SUBCUTANEOUS at 21:52

## 2024-06-16 RX ADMIN — POLYETHYLENE GLYCOL 3350 17 G: 17 POWDER, FOR SOLUTION ORAL at 08:28

## 2024-06-16 RX ADMIN — METOCLOPRAMIDE HYDROCHLORIDE 5 MG: 5 INJECTION INTRAMUSCULAR; INTRAVENOUS at 12:33

## 2024-06-16 RX ADMIN — ONDANSETRON 4 MG: 2 INJECTION INTRAMUSCULAR; INTRAVENOUS at 20:20

## 2024-06-16 RX ADMIN — KETOTIFEN FUMARATE 1 DROP: 0.25 SOLUTION/ DROPS OPHTHALMIC at 21:57

## 2024-06-16 RX ADMIN — SODIUM BICARBONATE 650 MG TABLET 1300 MG: at 08:28

## 2024-06-16 RX ADMIN — PROCHLORPERAZINE EDISYLATE 10 MG: 5 INJECTION INTRAMUSCULAR; INTRAVENOUS at 14:53

## 2024-06-16 RX ADMIN — KETOTIFEN FUMARATE 1 DROP: 0.25 SOLUTION/ DROPS OPHTHALMIC at 08:28

## 2024-06-16 RX ADMIN — METOCLOPRAMIDE HYDROCHLORIDE 5 MG: 5 INJECTION INTRAMUSCULAR; INTRAVENOUS at 04:29

## 2024-06-16 RX ADMIN — SODIUM CHLORIDE, PRESERVATIVE FREE 10 ML: 5 INJECTION INTRAVENOUS at 08:25

## 2024-06-16 RX ADMIN — AMLODIPINE BESYLATE 10 MG: 10 TABLET ORAL at 08:26

## 2024-06-16 RX ADMIN — CALCIUM GLUCONATE: 98 INJECTION, SOLUTION INTRAVENOUS at 17:38

## 2024-06-16 RX ADMIN — PROCHLORPERAZINE EDISYLATE 10 MG: 5 INJECTION INTRAMUSCULAR; INTRAVENOUS at 23:51

## 2024-06-16 RX ADMIN — FLUTICASONE PROPIONATE 1 SPRAY: 50 SPRAY, METERED NASAL at 08:27

## 2024-06-16 RX ADMIN — CETIRIZINE HYDROCHLORIDE 5 MG: 10 TABLET, FILM COATED ORAL at 08:26

## 2024-06-16 RX ADMIN — FAMOTIDINE 20 MG: 10 INJECTION, SOLUTION INTRAVENOUS at 08:26

## 2024-06-16 ASSESSMENT — PAIN SCALES - GENERAL: PAINLEVEL_OUTOF10: 0

## 2024-06-16 NOTE — PROGRESS NOTES
Subjective:   Daily Progress Note: 6/16/2024 12:16 PM    Seen and examined in bed. Resting in bed. Pt informed of worsening kidney failure and decreased urine output and now needs dialysis. She is \"aware of her renal fxn deteriorating and will think about it\"    Comfortable  No CP No SOB  Abd pain  MS -      Current Facility-Administered Medications   Medication Dose Route Frequency    polyethylene glycol (GLYCOLAX) packet 17 g  17 g Oral Daily    bisacodyl (DULCOLAX) suppository 10 mg  10 mg Rectal Daily    PN-Adult Premix 4.25/5 - Peripheral Line   IntraVENous Continuous TPN    PN-Adult Premix 4.25/5 - Peripheral Line   IntraVENous Continuous TPN    metoclopramide (REGLAN) injection 5 mg  5 mg IntraVENous Q6H    hydrALAZINE (APRESOLINE) injection 10 mg  10 mg IntraVENous Q6H PRN    sodium bicarbonate tablet 1,300 mg  1,300 mg Oral BID    bisacodyl (DULCOLAX) suppository 10 mg  10 mg Rectal Daily PRN    amLODIPine (NORVASC) tablet 10 mg  10 mg Oral Daily    fluticasone (FLONASE) 50 MCG/ACT nasal spray 1 spray  1 spray Each Nostril Daily    cetirizine (ZYRTEC) tablet 5 mg  5 mg Oral Daily    ketotifen fumarate (ZADITOR) 0.035 % ophthalmic solution 1 drop  1 drop Both Eyes BID    sodium chloride flush 0.9 % injection 5-40 mL  5-40 mL IntraVENous 2 times per day    sodium chloride flush 0.9 % injection 5-40 mL  5-40 mL IntraVENous PRN    0.9 % sodium chloride infusion   IntraVENous PRN    potassium chloride 10 mEq/100 mL IVPB (Peripheral Line)  10 mEq IntraVENous PRN    magnesium sulfate 1000 mg in dextrose 5% 100 mL IVPB  1,000 mg IntraVENous PRN    prochlorperazine (COMPAZINE) tablet 10 mg  10 mg Oral Q6H PRN    Or    prochlorperazine (COMPAZINE) injection 10 mg  10 mg IntraVENous Q6H PRN    heparin (porcine) injection 5,000 Units  5,000 Units SubCUTAneous BID    simethicone (MYLICON) chewable tablet 80 mg  80 mg Oral Q6H PRN    HYDROmorphone (DILAUDID) injection 0.5 mg  0.5 mg IntraVENous Q3H PRN    HYDROmorphone  15.4 g/dL    Hematocrit 31.0 (L) 35.8 - 46.3 %    MCV 89.1 82 - 102 FL    MCH 26.4 26.1 - 32.9 PG    MCHC 29.7 (L) 31.4 - 35.0 g/dL    RDW 14.7 (H) 11.9 - 14.6 %    Platelets 321 150 - 450 K/uL    MPV 10.8 9.4 - 12.3 FL    nRBC 0.02 0.0 - 0.2 K/uL    Neutrophils % 68 43 - 78 %    Lymphocytes % 13 13 - 44 %    Monocytes % 6 4.0 - 12.0 %    Eosinophils % 3 0.5 - 7.8 %    Basophils % 1 0.0 - 2.0 %    Immature Granulocytes % 9 (H) 0.0 - 5.0 %    Neutrophils Absolute 11.5 (H) 1.7 - 8.2 K/UL    Lymphocytes Absolute 2.2 0.5 - 4.6 K/UL    Monocytes Absolute 1.0 0.1 - 1.3 K/UL    Eosinophils Absolute 0.5 0.0 - 0.8 K/UL    Basophils Absolute 0.2 0.0 - 0.2 K/UL    Immature Granulocytes Absolute 1.5 (H) 0.0 - 0.5 K/UL    RBC Comment OCCASIONAL  ANISOCYTOSIS        WBC Comment Result Confirmed By Smear      Platelet Comment ADEQUATE      Differential Type AUTOMATED         Assessment     Patient Active Problem List    Diagnosis Date Noted    Chronic kidney disease, stage 5 ([facility]) 01/17/2023    Chronic kidney disease (CKD), stage V ([facility]) [088402] 01/17/2023    Ventral hernia without obstruction or gangrene 05/28/2024    Morbid obesity ([facility]) 05/28/2024    Metabolic acidosis 05/01/2024    Iron deficiency anemia 12/08/2022    Hypercholesterolemia 09/28/2022    IFG (impaired fasting glucose) 05/20/2022    Stage 4 chronic kidney disease ([facility]) 04/05/2022    Allergic conjunctivitis of both eyes 10/30/2021    Allergic rhinitis due to pollen 04/28/2021    Abdominal pain 03/13/2021    Acute UTI 03/13/2021    Difficult or painful urination 03/13/2021    Obesity, morbid ([facility]) 08/29/2018    DDD (degenerative disc disease), lumbar 07/13/2017    Irritable bowel syndrome with diarrhea 11/30/2016    Chronic low back pain with bilateral sciatica 10/09/2016    History of small bowel obstruction 05/30/2016    Primary fibromyalgia 04/04/2016    Obesity 04/14/2015    Allergy 04/14/2015    Arthritis of both knees 04/14/2015    Benign hypertension with

## 2024-06-16 NOTE — PROGRESS NOTES
Pt had her son bring her a milkshake.  Pt took some of the shake and c/o nausea.  No emesis    14:53 Compazine 10mg IV given for nausea    15:15 Up to [facility] [place] [place] and voided and had a medium sized brown loose to watery stool.      15:30  Pt reports nausea resolved.  NG removed per order.  Pt still does not want her dulcolax suppository.  Will continue to encourage    1700  Pt vomited 750cc of yellow colored emesis.  Stated she felt better but continues to refuse her dulcolax stating she had a BM this afternoon.  NP notified

## 2024-06-16 NOTE — PROGRESS NOTES
Pt requested to take her dulcolax later today after all of her visitors leave.  Instructed pt to let me know when she was ready to receive

## 2024-06-16 NOTE — PROGRESS NOTES
END OF SHIFT NOTE:    INTAKE/OUTPUT  06/15 0701 - 06/16 0700  In: 3969.6   Out: 640 [Urine:490]  Voiding: No  Catheter: Yes; external  Drain:   NG/OG/NJ/NE Tube Nasogastric 16 fr Right nostril (Active)   Surrounding Skin Clean, dry & intact 06/15/24 1915   Securement device Tape 06/15/24 1915   Status Clamped 06/15/24 1915   Placement Verified External Catheter Length 06/15/24 1915   NG/OG/NJ/NE External Measurement (cm) 55 cm 06/15/24 1915   Drainage Appearance Yellow 06/15/24 0829   Output (mL) 150 ml 06/15/24 0829   Action Taken Tubing changed 06/14/24 1200       External Urinary Catheter (Active)   Site Assessment Clean,dry & intact 06/15/24 2130   Placement Initiated 06/15/24 2130   Securement Method Securing device (Describe) 06/15/24 2130   Catheter Care Catheter/Wick replaced 06/15/24 2130   Perineal Care Yes 06/15/24 2130   Suction 40 mmgHg continuous 06/15/24 2130   Output (mL) 240 mL 06/15/24 1140               Flatus: Patient does have flatus present.    Stool:  occurrences.    Characteristics:  Stool Appearance: Soft  Stool Color: Brown  Stool Amount: Small  Stool Assessment  Incontinence: No  Stool Appearance: Soft  Stool Color: Brown  Stool Amount: Small  Stool Source: Rectum  Last BM (including prior to admit): 06/15/24    Emesis:  occurrences.    Characteristics:   Emesis Appearance: Bilious    VITAL SIGNS  Patient Vitals for the past 12 hrs:   Temp Pulse Resp BP SpO2   06/16/24 0339 98.6 °F (37 °C) 93 18 (!) 160/70 94 %   06/15/24 2343 -- -- -- (!) 158/70 --   06/15/24 2335 97.7 °F (36.5 °C) 88 18 (!) 183/79 91 %   06/15/24 1925 98.1 °F (36.7 °C) 87 18 (!) 149/67 95 %       Pain Assessment  Pain Level: 0 (06/15/24 1915)  Pain Location: Abdomen  Patient's Stated Pain Goal: 0 - No pain    Ambulating  Yes with assistance    Shift report given to oncoming nurse at the bedside.    TALITA MEADOWS RN

## 2024-06-16 NOTE — PROGRESS NOTES
General Surgery Progress Note    6/16/2024    Admit Date: 6/6/2024    Subjective:   Surgery   The patient feels \"better today.\" She would like a milkshake. Two bowel movements yesterday.     Objective:     BP (!) 151/74   Pulse 88   Temp 98.2 °F (36.8 °C) (Oral)   Resp 17   Ht 1.651 m (5' 5\")   Wt 111.7 kg (246 lb 4.8 oz) Comment: RD obtained  SpO2 96%   BMI 40.99 kg/m²       Intake/Output Summary (Last 24 hours) at 6/16/2024 1014  Last data filed at 6/16/2024 0339  Gross per 24 hour   Intake 3969.55 ml   Output 490 ml   Net 3479.55 ml        EXAM:  ABD soft, less distended, active BS'S. Two BM'S yesterday.        Data Review    Recent Results (from the past 24 hour(s))   Basic Metabolic Panel    Collection Time: 06/16/24  3:49 AM   Result Value Ref Range    Sodium 136 136 - 145 mmol/L    Potassium 3.9 3.5 - 5.1 mmol/L    Chloride 102 98 - 107 mmol/L    CO2 16 (L) 20 - 28 mmol/L    Anion Gap 18 9 - 18 mmol/L    Glucose 120 (H) 70 - 99 mg/dL    BUN 99 (H) 8 - 23 MG/DL    Creatinine 8.27 (H) 0.60 - 1.10 MG/DL    Est, Glom Filt Rate 5 (L) >60 ml/min/1.73m2    Calcium 9.4 8.8 - 10.2 MG/DL   Magnesium    Collection Time: 06/16/24  3:49 AM   Result Value Ref Range    Magnesium 2.8 (H) 1.8 - 2.4 mg/dL   Phosphorus    Collection Time: 06/16/24  3:49 AM   Result Value Ref Range    Phosphorus 5.6 (H) 2.5 - 4.5 MG/DL   CBC with Auto Differential    Collection Time: 06/16/24  3:49 AM   Result Value Ref Range    WBC 16.9 (H) 4.3 - 11.1 K/uL    RBC 3.48 (L) 4.05 - 5.2 M/uL    Hemoglobin 9.2 (L) 11.7 - 15.4 g/dL    Hematocrit 31.0 (L) 35.8 - 46.3 %    MCV 89.1 82 - 102 FL    MCH 26.4 26.1 - 32.9 PG    MCHC 29.7 (L) 31.4 - 35.0 g/dL    RDW 14.7 (H) 11.9 - 14.6 %    Platelets 321 150 - 450 K/uL    MPV 10.8 9.4 - 12.3 FL    nRBC 0.02 0.0 - 0.2 K/uL    Neutrophils % 68 43 - 78 %    Lymphocytes % 13 13 - 44 %    Monocytes % 6 4.0 - 12.0 %    Eosinophils % 3 0.5 - 7.8 %    Basophils % 1 0.0 - 2.0 %    Immature Granulocytes % 9  (H) 0.0 - 5.0 %    Neutrophils Absolute 11.5 (H) 1.7 - 8.2 K/UL    Lymphocytes Absolute 2.2 0.5 - 4.6 K/UL    Monocytes Absolute 1.0 0.1 - 1.3 K/UL    Eosinophils Absolute 0.5 0.0 - 0.8 K/UL    Basophils Absolute 0.2 0.0 - 0.2 K/UL    Immature Granulocytes Absolute 1.5 (H) 0.0 - 0.5 K/UL    RBC Comment OCCASIONAL  ANISOCYTOSIS        WBC Comment Result Confirmed By Smear      Platelet Comment ADEQUATE      Differential Type AUTOMATED          Principal Problem:    Ventral hernia without obstruction or gangrene  Active Problems:    Morbid obesity (HCC)  Resolved Problems:    * No resolved hospital problems. *        Plan: 1. Remove NG-tube  2. Full liquid diet  3. Miralax/Dulcolax every day  4. Patient willing to talk to Nephrology about HD, now that creatinine is 8.2.  5. Jef Crain MD.

## 2024-06-17 ENCOUNTER — APPOINTMENT (OUTPATIENT)
Dept: CT IMAGING | Age: 79
DRG: 353 | End: 2024-06-17
Attending: SURGERY
Payer: MEDICARE

## 2024-06-17 LAB
ANION GAP SERPL CALC-SCNC: 18 MMOL/L (ref 9–18)
BASOPHILS # BLD: 0.2 K/UL (ref 0–0.2)
BASOPHILS NFR BLD: 1 % (ref 0–2)
BUN SERPL-MCNC: 111 MG/DL (ref 8–23)
CALCIUM SERPL-MCNC: 9.7 MG/DL (ref 8.8–10.2)
CHLORIDE SERPL-SCNC: 103 MMOL/L (ref 98–107)
CO2 SERPL-SCNC: 16 MMOL/L (ref 20–28)
CREAT SERPL-MCNC: 9.16 MG/DL (ref 0.6–1.1)
DIFFERENTIAL METHOD BLD: ABNORMAL
EOSINOPHIL # BLD: 0.4 K/UL (ref 0–0.8)
EOSINOPHIL NFR BLD: 2 % (ref 0.5–7.8)
ERYTHROCYTE [DISTWIDTH] IN BLOOD BY AUTOMATED COUNT: 14.6 % (ref 11.9–14.6)
GLUCOSE SERPL-MCNC: 123 MG/DL (ref 70–99)
HCT VFR BLD AUTO: 31.7 % (ref 35.8–46.3)
HGB BLD-MCNC: 9.5 G/DL (ref 11.7–15.4)
IMM GRANULOCYTES # BLD AUTO: 1.5 K/UL (ref 0–0.5)
IMM GRANULOCYTES NFR BLD AUTO: 8 % (ref 0–5)
LYMPHOCYTES # BLD: 2.6 K/UL (ref 0.5–4.6)
LYMPHOCYTES NFR BLD: 14 % (ref 13–44)
MAGNESIUM SERPL-MCNC: 2.7 MG/DL (ref 1.8–2.4)
MCH RBC QN AUTO: 26.5 PG (ref 26.1–32.9)
MCHC RBC AUTO-ENTMCNC: 30 G/DL (ref 31.4–35)
MCV RBC AUTO: 88.3 FL (ref 82–102)
MONOCYTES # BLD: 1.1 K/UL (ref 0.1–1.3)
MONOCYTES NFR BLD: 6 % (ref 4–12)
NEUTS SEG # BLD: 13 K/UL (ref 1.7–8.2)
NEUTS SEG NFR BLD: 69 % (ref 43–78)
NRBC # BLD: 0 K/UL (ref 0–0.2)
PHOSPHATE SERPL-MCNC: 5.6 MG/DL (ref 2.5–4.5)
PLATELET # BLD AUTO: 334 K/UL (ref 150–450)
PLATELET COMMENT: ADEQUATE
PMV BLD AUTO: 10.8 FL (ref 9.4–12.3)
POTASSIUM SERPL-SCNC: 4.1 MMOL/L (ref 3.5–5.1)
RBC # BLD AUTO: 3.59 M/UL (ref 4.05–5.2)
RBC MORPH BLD: ABNORMAL
SODIUM SERPL-SCNC: 136 MMOL/L (ref 136–145)
WBC # BLD AUTO: 18.8 K/UL (ref 4.3–11.1)
WBC MORPH BLD: ABNORMAL

## 2024-06-17 PROCEDURE — 1100000000 HC RM PRIVATE

## 2024-06-17 PROCEDURE — 6360000004 HC RX CONTRAST MEDICATION: Performed by: NURSE PRACTITIONER

## 2024-06-17 PROCEDURE — 36415 COLL VENOUS BLD VENIPUNCTURE: CPT

## 2024-06-17 PROCEDURE — 83735 ASSAY OF MAGNESIUM: CPT

## 2024-06-17 PROCEDURE — 2580000003 HC RX 258: Performed by: NURSE PRACTITIONER

## 2024-06-17 PROCEDURE — 6370000000 HC RX 637 (ALT 250 FOR IP): Performed by: SURGERY

## 2024-06-17 PROCEDURE — 80048 BASIC METABOLIC PNL TOTAL CA: CPT

## 2024-06-17 PROCEDURE — 2500000003 HC RX 250 WO HCPCS: Performed by: SURGERY

## 2024-06-17 PROCEDURE — 84100 ASSAY OF PHOSPHORUS: CPT

## 2024-06-17 PROCEDURE — 6360000002 HC RX W HCPCS: Performed by: NURSE PRACTITIONER

## 2024-06-17 PROCEDURE — 6360000002 HC RX W HCPCS: Performed by: SURGERY

## 2024-06-17 PROCEDURE — 85025 COMPLETE CBC W/AUTO DIFF WBC: CPT

## 2024-06-17 PROCEDURE — 74176 CT ABD & PELVIS W/O CONTRAST: CPT

## 2024-06-17 PROCEDURE — 6360000002 HC RX W HCPCS

## 2024-06-17 PROCEDURE — C9113 INJ PANTOPRAZOLE SODIUM, VIA: HCPCS | Performed by: NURSE PRACTITIONER

## 2024-06-17 PROCEDURE — 2580000003 HC RX 258: Performed by: SURGERY

## 2024-06-17 PROCEDURE — 6370000000 HC RX 637 (ALT 250 FOR IP): Performed by: INTERNAL MEDICINE

## 2024-06-17 RX ADMIN — FAMOTIDINE 20 MG: 20 TABLET, FILM COATED ORAL at 09:23

## 2024-06-17 RX ADMIN — PROCHLORPERAZINE MALEATE 10 MG: 10 TABLET ORAL at 14:57

## 2024-06-17 RX ADMIN — SODIUM BICARBONATE 650 MG TABLET 1300 MG: at 09:20

## 2024-06-17 RX ADMIN — CALCIUM GLUCONATE: 98 INJECTION, SOLUTION INTRAVENOUS at 18:10

## 2024-06-17 RX ADMIN — DIATRIZOATE MEGLUMINE AND DIATRIZOATE SODIUM 15 ML: 660; 100 LIQUID ORAL; RECTAL at 10:50

## 2024-06-17 RX ADMIN — AMLODIPINE BESYLATE 10 MG: 10 TABLET ORAL at 09:21

## 2024-06-17 RX ADMIN — HYDRALAZINE HYDROCHLORIDE 10 MG: 20 INJECTION INTRAMUSCULAR; INTRAVENOUS at 03:16

## 2024-06-17 RX ADMIN — CETIRIZINE HYDROCHLORIDE 5 MG: 10 TABLET, FILM COATED ORAL at 09:22

## 2024-06-17 RX ADMIN — PANTOPRAZOLE SODIUM 40 MG: 40 INJECTION, POWDER, FOR SOLUTION INTRAVENOUS at 22:04

## 2024-06-17 RX ADMIN — HEPARIN SODIUM 5000 UNITS: 5000 INJECTION INTRAVENOUS; SUBCUTANEOUS at 21:38

## 2024-06-17 RX ADMIN — KETOTIFEN FUMARATE 1 DROP: 0.25 SOLUTION/ DROPS OPHTHALMIC at 21:38

## 2024-06-17 RX ADMIN — PROCHLORPERAZINE EDISYLATE 10 MG: 5 INJECTION INTRAMUSCULAR; INTRAVENOUS at 20:22

## 2024-06-17 RX ADMIN — SODIUM CHLORIDE, PRESERVATIVE FREE 10 ML: 5 INJECTION INTRAVENOUS at 21:39

## 2024-06-17 RX ADMIN — HEPARIN SODIUM 5000 UNITS: 5000 INJECTION INTRAVENOUS; SUBCUTANEOUS at 09:24

## 2024-06-17 RX ADMIN — FLUTICASONE PROPIONATE 1 SPRAY: 50 SPRAY, METERED NASAL at 09:30

## 2024-06-17 RX ADMIN — KETOTIFEN FUMARATE 1 DROP: 0.25 SOLUTION/ DROPS OPHTHALMIC at 09:30

## 2024-06-17 RX ADMIN — I.V. FAT EMULSION 250 ML: 20 EMULSION INTRAVENOUS at 18:09

## 2024-06-17 ASSESSMENT — PAIN SCALES - GENERAL: PAINLEVEL_OUTOF10: 0

## 2024-06-17 NOTE — PROGRESS NOTES
END OF SHIFT NOTE:    INTAKE/OUTPUT  06/16 0701 - 06/17 0700  In: 2069.4   Out: 1350   Voiding: Yes  Catheter: No  Drain:   External Urinary Catheter (Active)   Site Assessment Clean,dry & intact 06/16/24 1525   Placement Replaced 06/16/24 1525   Securement Method Securing device (Describe) 06/16/24 1525   Catheter Care Catheter/Wick replaced 06/17/24 1431   Perineal Care Yes 06/17/24 1431   Suction 40 mmgHg continuous 06/16/24 0806   Output (mL) 100 mL 06/17/24 1529               Flatus: Patient does have flatus present.    Stool:  occurrences.    Characteristics:  Stool Appearance: Loose, Watery  Stool Color: Green  Stool Amount: Medium  Stool Assessment  Incontinence: Yes  Stool Appearance: Loose, Watery  Stool Color: Green  Stool Amount: Medium  Stool Source: Rectum  Last BM (including prior to admit): 06/17/24    Emesis:  occurrences.    Characteristics:   Emesis Appearance: Green    VITAL SIGNS  Patient Vitals for the past 12 hrs:   Temp Pulse Resp BP SpO2   06/17/24 1529 97.4 °F (36.3 °C) 88 19 (!) 152/70 94 %   06/17/24 1116 98.4 °F (36.9 °C) 84 19 (!) 151/82 97 %   06/17/24 0915 -- 83 -- (!) 168/71 98 %   06/17/24 0744 98.1 °F (36.7 °C) 87 18 (!) 145/71 97 %       Pain Assessment  Pain Level: 0 (06/16/24 1915)  Pain Location: Abdomen  Patient's Stated Pain Goal: 0 - No pain    Ambulating  Yes    Shift report given to oncoming nurse at the bedside.    Yvonne Conway RN

## 2024-06-17 NOTE — PROGRESS NOTES
General Surgery Progress Note    6/17/2024    Admit Date: 6/6/2024    Subjective:   Surgery   Unable to tolerate FLD yesterday- vomiting- NGT has been dc'd  Still having 2 BM's daily    Objective:     BP (!) 168/71   Pulse 83   Temp 98.1 °F (36.7 °C) (Oral)   Resp 18   Ht 1.651 m (5' 5\")   Wt 114.3 kg (252 lb)   SpO2 98%   BMI 41.93 kg/m²       Intake/Output Summary (Last 24 hours) at 6/17/2024 0931  Last data filed at 6/17/2024 0744  Gross per 24 hour   Intake 2069.44 ml   Output 1650 ml   Net 419.44 ml    NGT 1350ml yesterday    EXAM:  ABD soft, less distended, active BS'S. Two BM'S yesterday.        Data Review    Recent Results (from the past 24 hour(s))   Basic Metabolic Panel    Collection Time: 06/17/24  4:01 AM   Result Value Ref Range    Sodium 136 136 - 145 mmol/L    Potassium 4.1 3.5 - 5.1 mmol/L    Chloride 103 98 - 107 mmol/L    CO2 16 (L) 20 - 28 mmol/L    Anion Gap 18 9 - 18 mmol/L    Glucose 123 (H) 70 - 99 mg/dL     (H) 8 - 23 MG/DL    Creatinine 9.16 (H) 0.60 - 1.10 MG/DL    Est, Glom Filt Rate 4 (L) >60 ml/min/1.73m2    Calcium 9.7 8.8 - 10.2 MG/DL   Magnesium    Collection Time: 06/17/24  4:01 AM   Result Value Ref Range    Magnesium 2.7 (H) 1.8 - 2.4 mg/dL   Phosphorus    Collection Time: 06/17/24  4:01 AM   Result Value Ref Range    Phosphorus 5.6 (H) 2.5 - 4.5 MG/DL   CBC with Auto Differential    Collection Time: 06/17/24  4:01 AM   Result Value Ref Range    WBC 18.8 (H) 4.3 - 11.1 K/uL    RBC 3.59 (L) 4.05 - 5.2 M/uL    Hemoglobin 9.5 (L) 11.7 - 15.4 g/dL    Hematocrit 31.7 (L) 35.8 - 46.3 %    MCV 88.3 82 - 102 FL    MCH 26.5 26.1 - 32.9 PG    MCHC 30.0 (L) 31.4 - 35.0 g/dL    RDW 14.6 11.9 - 14.6 %    Platelets 334 150 - 450 K/uL    MPV 10.8 9.4 - 12.3 FL    nRBC 0.00 0.0 - 0.2 K/uL    Neutrophils % 69 43 - 78 %    Lymphocytes % 14 13 - 44 %    Monocytes % 6 4.0 - 12.0 %    Eosinophils % 2 0.5 - 7.8 %    Basophils % 1 0.0 - 2.0 %    Immature Granulocytes % 8 (H) 0.0 - 5.0 %

## 2024-06-17 NOTE — PROGRESS NOTES
Subjective:   Daily Progress Note: 6/17/2024 9:29 AM    Poor appetite     Comfortable  No CP No SOB  Abd pain      Current Facility-Administered Medications   Medication Dose Route Frequency    polyethylene glycol (GLYCOLAX) packet 17 g  17 g Oral Daily    bisacodyl (DULCOLAX) suppository 10 mg  10 mg Rectal Daily    PN-Adult Premix 4.25/5 - Peripheral Line   IntraVENous Continuous TPN    hydrALAZINE (APRESOLINE) injection 10 mg  10 mg IntraVENous Q6H PRN    sodium bicarbonate tablet 1,300 mg  1,300 mg Oral BID    bisacodyl (DULCOLAX) suppository 10 mg  10 mg Rectal Daily PRN    amLODIPine (NORVASC) tablet 10 mg  10 mg Oral Daily    fluticasone (FLONASE) 50 MCG/ACT nasal spray 1 spray  1 spray Each Nostril Daily    cetirizine (ZYRTEC) tablet 5 mg  5 mg Oral Daily    ketotifen fumarate (ZADITOR) 0.035 % ophthalmic solution 1 drop  1 drop Both Eyes BID    sodium chloride flush 0.9 % injection 5-40 mL  5-40 mL IntraVENous 2 times per day    sodium chloride flush 0.9 % injection 5-40 mL  5-40 mL IntraVENous PRN    0.9 % sodium chloride infusion   IntraVENous PRN    potassium chloride 10 mEq/100 mL IVPB (Peripheral Line)  10 mEq IntraVENous PRN    magnesium sulfate 1000 mg in dextrose 5% 100 mL IVPB  1,000 mg IntraVENous PRN    prochlorperazine (COMPAZINE) tablet 10 mg  10 mg Oral Q6H PRN    Or    prochlorperazine (COMPAZINE) injection 10 mg  10 mg IntraVENous Q6H PRN    heparin (porcine) injection 5,000 Units  5,000 Units SubCUTAneous BID    simethicone (MYLICON) chewable tablet 80 mg  80 mg Oral Q6H PRN    HYDROmorphone (DILAUDID) injection 0.5 mg  0.5 mg IntraVENous Q3H PRN    HYDROmorphone (DILAUDID) injection 1 mg  1 mg IntraVENous Q3H PRN    oxyCODONE (ROXICODONE) immediate release tablet 5 mg  5 mg Oral Q4H PRN    oxyCODONE (ROXICODONE) immediate release tablet 10 mg  10 mg Oral Q4H PRN    famotidine (PEPCID) tablet 20 mg  20 mg Oral Daily    Or    famotidine (PEPCID) 20 mg in sodium chloride (PF) 0.9 % 10 mL

## 2024-06-17 NOTE — PROGRESS NOTES
General Surgery   CT ABDOMEN PELVIS WO CONTRAST Additional Contrast? Oral  Order: [id number]  Status: Final result       Visible to patient: No (not released)       Next appt: 06/20/2024 at 03:30 PM in General Surgery (SURESH CESAR MD)    0 Result Notes  Details    Reading Physician Reading Date Result Priority   Valeriano Peterson MD  [phone] 6/17/2024      Narrative & Impression  CT OF THE ABDOMEN AND PELVIS     INDICATION: Nausea with vomiting, unable to tolerate diet. Status post ventral  hernia repair with mesh.     TECHNIQUE: Multiple 2D axial images were obtained through the abdomen and pelvis  without IV contrast.  Oral contrast was used for bowel opacification.  Radiation  dose reduction techniques were used for this study:  All CT scans performed at  this facility use one or all of the following: Automated exposure control,  adjustment of the mA and/or kVp according to patient's size, iterative  reconstruction.     COMPARISON: Follow-through study dated 6/13/2024 and plain abdomen dated  6/20/2024.     FINDINGS:  - LUNG BASES: Minor atelectasis right base.  - LIVER: Normal in size and appearance. No focal lesion is seen.  - GALLBLADDER/BILE DUCTS: Status post cholecystectomy. No biliary ductal  dilatation.  - PANCREAS: Normal.  - SPLEEN: Normal.     - ADRENALS: Both glands show mild diffuse thickening without discrete lesion  - KIDNEYS/URETERS: No hydronephrosis or significant mass.  - BLADDER: Appears normal in the visualized portion as artifacts from the  bilateral hip prosthesis limit the visualization.  - REPRODUCTIVE ORGANS: Pelvic visualization is limited due to artifact from the  hip prosthesis.     - BOWEL: No definite bowel obstruction is seen and the contrast is visualized in  the rectum. The proximal and mid small bowel loops are distended measuring up to  4.8 cm. The distal ileal loop and colon appears of normal caliber. No definite  point of transition is seen. No pneumatosis is evident.

## 2024-06-17 NOTE — CARE COORDINATION
CM reviewed chart.     NG tube removed yesterday. Patient's discharge plan continues to be to go home with [facility] once medically stable for discharge.     CM will continue to follow patient for any other discharge planning needs.

## 2024-06-17 NOTE — PROGRESS NOTES
END OF SHIFT NOTE:    INTAKE/OUTPUT  06/16 0701 - 06/17 0700  In: 2069.4   Out: 1350   Voiding: Yes  Catheter: Yes; external  Drain:   External Urinary Catheter (Active)   Site Assessment Clean,dry & intact 06/16/24 1525   Placement Replaced 06/16/24 1525   Securement Method Securing device (Describe) 06/16/24 1525   Catheter Care Catheter/Wick replaced 06/16/24 1525   Perineal Care Yes 06/16/24 1525   Suction 40 mmgHg continuous 06/16/24 0806   Output (mL) 240 mL 06/15/24 1140               Flatus: Patient does have flatus present.    Stool:  occurrences.    Characteristics:  Stool Appearance: Loose, Watery  Stool Color: Brown  Stool Amount: Medium  Stool Assessment  Incontinence: Yes  Stool Appearance: Loose, Watery  Stool Color: Brown  Stool Amount: Medium  Stool Source: Rectum  Last BM (including prior to admit): 06/16/24    Emesis:  occurrences.    Characteristics:   Emesis Appearance: Bilious    VITAL SIGNS  Patient Vitals for the past 12 hrs:   Temp Pulse Resp BP SpO2   06/17/24 0400 -- -- -- (!) 155/79 --   06/17/24 0316 -- -- -- (!) 179/73 --   06/17/24 0307 98.4 °F (36.9 °C) 92 19 (!) 179/73 92 %   06/16/24 2310 98.8 °F (37.1 °C) 98 18 (!) 154/87 92 %   06/16/24 2015 -- -- -- (!) 152/76 --   06/16/24 1907 97.5 °F (36.4 °C) 88 19 (!) 168/92 96 %       Pain Assessment  Pain Level: 0 (06/16/24 1915)  Pain Location: Abdomen  Patient's Stated Pain Goal: 0 - No pain    Ambulating  Yes with assistance    Shift report given to oncoming nurse at the bedside.    TALITA MEADOWS RN

## 2024-06-17 NOTE — PROGRESS NOTES
2004: Pt currently complaining of nausea. Pt has PRN compazine Q6 hours but was given @1500. Gen surg on call notified. New orders received.     2020: IV zofran PRN given    2300: Pt vomited 600 mL yellow emesis. Gen surg on call notified. No new orders received.    2351: IV compazine given.     0030: Pt resting comfortably with no complaints of nausea.

## 2024-06-18 LAB
ANION GAP SERPL CALC-SCNC: 20 MMOL/L (ref 9–18)
BUN SERPL-MCNC: 124 MG/DL (ref 8–23)
CALCIUM SERPL-MCNC: 9.5 MG/DL (ref 8.8–10.2)
CHLORIDE SERPL-SCNC: 102 MMOL/L (ref 98–107)
CO2 SERPL-SCNC: 16 MMOL/L (ref 20–28)
CREAT SERPL-MCNC: 10 MG/DL (ref 0.6–1.1)
GLUCOSE SERPL-MCNC: 129 MG/DL (ref 70–99)
HAV IGM SER QL: NONREACTIVE
HBV CORE IGM SER QL: NONREACTIVE
HBV SURFACE AB SERPL IA-ACNC: 1727 MIU/ML
HBV SURFACE AG SER QL: NONREACTIVE
HCV AB SER QL: NONREACTIVE
MAGNESIUM SERPL-MCNC: 2.6 MG/DL (ref 1.8–2.4)
PHOSPHATE SERPL-MCNC: 6 MG/DL (ref 2.5–4.5)
POTASSIUM SERPL-SCNC: 3.8 MMOL/L (ref 3.5–5.1)
SODIUM SERPL-SCNC: 138 MMOL/L (ref 136–145)

## 2024-06-18 PROCEDURE — 2580000003 HC RX 258: Performed by: NURSE PRACTITIONER

## 2024-06-18 PROCEDURE — 80074 ACUTE HEPATITIS PANEL: CPT

## 2024-06-18 PROCEDURE — 1100000000 HC RM PRIVATE

## 2024-06-18 PROCEDURE — 6370000000 HC RX 637 (ALT 250 FOR IP): Performed by: INTERNAL MEDICINE

## 2024-06-18 PROCEDURE — 86704 HEP B CORE ANTIBODY TOTAL: CPT

## 2024-06-18 PROCEDURE — 36415 COLL VENOUS BLD VENIPUNCTURE: CPT

## 2024-06-18 PROCEDURE — 86706 HEP B SURFACE ANTIBODY: CPT

## 2024-06-18 PROCEDURE — 02H633Z INSERTION OF INFUSION DEVICE INTO RIGHT ATRIUM, PERCUTANEOUS APPROACH: ICD-10-PCS | Performed by: RADIOLOGY

## 2024-06-18 PROCEDURE — 6360000002 HC RX W HCPCS: Performed by: SURGERY

## 2024-06-18 PROCEDURE — 6360000002 HC RX W HCPCS: Performed by: NURSE PRACTITIONER

## 2024-06-18 PROCEDURE — 6370000000 HC RX 637 (ALT 250 FOR IP): Performed by: SURGERY

## 2024-06-18 PROCEDURE — 2500000003 HC RX 250 WO HCPCS: Performed by: SURGERY

## 2024-06-18 PROCEDURE — C9113 INJ PANTOPRAZOLE SODIUM, VIA: HCPCS | Performed by: NURSE PRACTITIONER

## 2024-06-18 PROCEDURE — 80048 BASIC METABOLIC PNL TOTAL CA: CPT

## 2024-06-18 PROCEDURE — 84100 ASSAY OF PHOSPHORUS: CPT

## 2024-06-18 PROCEDURE — 5A1D70Z PERFORMANCE OF URINARY FILTRATION, INTERMITTENT, LESS THAN 6 HOURS PER DAY: ICD-10-PCS | Performed by: INTERNAL MEDICINE

## 2024-06-18 PROCEDURE — 2500000003 HC RX 250 WO HCPCS: Performed by: NURSE PRACTITIONER

## 2024-06-18 PROCEDURE — 2580000003 HC RX 258: Performed by: SURGERY

## 2024-06-18 PROCEDURE — 83735 ASSAY OF MAGNESIUM: CPT

## 2024-06-18 RX ADMIN — HEPARIN SODIUM 5000 UNITS: 5000 INJECTION INTRAVENOUS; SUBCUTANEOUS at 08:40

## 2024-06-18 RX ADMIN — CALCIUM GLUCONATE: 98 INJECTION, SOLUTION INTRAVENOUS at 17:30

## 2024-06-18 RX ADMIN — OXYCODONE 10 MG: 5 TABLET ORAL at 15:03

## 2024-06-18 RX ADMIN — PANTOPRAZOLE SODIUM 40 MG: 40 INJECTION, POWDER, FOR SOLUTION INTRAVENOUS at 21:42

## 2024-06-18 RX ADMIN — PANTOPRAZOLE SODIUM 40 MG: 40 INJECTION, POWDER, FOR SOLUTION INTRAVENOUS at 08:40

## 2024-06-18 RX ADMIN — SODIUM BICARBONATE 650 MG TABLET 1300 MG: at 08:40

## 2024-06-18 RX ADMIN — TUBERCULIN PURIFIED PROTEIN DERIVATIVE 5 UNITS: 5 INJECTION, SOLUTION INTRADERMAL at 15:05

## 2024-06-18 RX ADMIN — HEPARIN SODIUM 5000 UNITS: 5000 INJECTION INTRAVENOUS; SUBCUTANEOUS at 20:16

## 2024-06-18 RX ADMIN — KETOTIFEN FUMARATE 1 DROP: 0.25 SOLUTION/ DROPS OPHTHALMIC at 08:41

## 2024-06-18 RX ADMIN — SODIUM BICARBONATE 650 MG TABLET 1300 MG: at 20:13

## 2024-06-18 RX ADMIN — SODIUM CHLORIDE, PRESERVATIVE FREE 10 ML: 5 INJECTION INTRAVENOUS at 08:42

## 2024-06-18 RX ADMIN — AMLODIPINE BESYLATE 10 MG: 10 TABLET ORAL at 08:39

## 2024-06-18 RX ADMIN — CETIRIZINE HYDROCHLORIDE 5 MG: 10 TABLET, FILM COATED ORAL at 08:39

## 2024-06-18 RX ADMIN — FLUTICASONE PROPIONATE 1 SPRAY: 50 SPRAY, METERED NASAL at 08:41

## 2024-06-18 ASSESSMENT — PAIN DESCRIPTION - LOCATION: LOCATION: ABDOMEN

## 2024-06-18 ASSESSMENT — PAIN - FUNCTIONAL ASSESSMENT: PAIN_FUNCTIONAL_ASSESSMENT: PREVENTS OR INTERFERES SOME ACTIVE ACTIVITIES AND ADLS

## 2024-06-18 ASSESSMENT — PAIN DESCRIPTION - ORIENTATION: ORIENTATION: MID

## 2024-06-18 ASSESSMENT — PAIN DESCRIPTION - DESCRIPTORS: DESCRIPTORS: ACHING

## 2024-06-18 ASSESSMENT — PAIN SCALES - GENERAL
PAINLEVEL_OUTOF10: 0
PAINLEVEL_OUTOF10: 7

## 2024-06-18 ASSESSMENT — PAIN SCALES - WONG BAKER: WONGBAKER_NUMERICALRESPONSE: NO HURT

## 2024-06-18 NOTE — PLAN OF CARE
Problem: Pain  Goal: Verbalizes/displays adequate comfort level or baseline comfort level  6/18/2024 1131 by Nadiya Wilder RN  Outcome: Progressing  6/18/2024 0311 by Svetlana Chiu RN  Outcome: Progressing     Problem: Safety - Adult  Goal: Free from fall injury  6/18/2024 1131 by Nadiya Wilder RN  Outcome: Progressing  6/18/2024 0311 by Svetlana Chiu RN  Outcome: Progressing     Problem: Discharge Planning  Goal: Discharge to home or other facility with appropriate resources  6/18/2024 1131 by Nadiya Wilder RN  Outcome: Progressing  6/18/2024 0311 by Svetlana Chiu RN  Outcome: Progressing     Problem: ABCDS Injury Assessment  Goal: Absence of physical injury  6/18/2024 1131 by Nadiya Wilder RN  Outcome: Progressing  6/18/2024 0311 by Svetlana Chiu RN  Outcome: Progressing     Problem: Skin/Tissue Integrity  Goal: Absence of new skin breakdown  Description: 1.  Monitor for areas of redness and/or skin breakdown  2.  Assess vascular access sites hourly  3.  Every 4-6 hours minimum:  Change oxygen saturation probe site  4.  Every 4-6 hours:  If on nasal continuous positive airway pressure, respiratory therapy assess nares and determine need for appliance change or resting period.  6/18/2024 1131 by Nadiya Wilder RN  Outcome: Progressing  6/18/2024 0311 by Svetlana Chiu RN  Outcome: Progressing

## 2024-06-18 NOTE — PROGRESS NOTES
Subjective:   Daily Progress Note: 6/18/2024 8:27 AM    Poor appetite     Comfortable  No CP No SOB  Abd pain      Current Facility-Administered Medications   Medication Dose Route Frequency    diatrizoate meglumine-sodium (GASTROGRAFIN) 66-10 % solution 15 mL  15 mL Oral ONCE PRN    PN-Adult Premix 4.25/5 - Peripheral Line   IntraVENous Continuous TPN    fat emulsion (INTRALIPID/NUTRILIPID) 20 % infusion 250 mL  250 mL IntraVENous Q MWF    pantoprazole (PROTONIX) 40 mg in sodium chloride (PF) 0.9 % 10 mL injection  40 mg IntraVENous Q12H    polyethylene glycol (GLYCOLAX) packet 17 g  17 g Oral Daily    bisacodyl (DULCOLAX) suppository 10 mg  10 mg Rectal Daily    hydrALAZINE (APRESOLINE) injection 10 mg  10 mg IntraVENous Q6H PRN    sodium bicarbonate tablet 1,300 mg  1,300 mg Oral BID    bisacodyl (DULCOLAX) suppository 10 mg  10 mg Rectal Daily PRN    amLODIPine (NORVASC) tablet 10 mg  10 mg Oral Daily    fluticasone (FLONASE) 50 MCG/ACT nasal spray 1 spray  1 spray Each Nostril Daily    cetirizine (ZYRTEC) tablet 5 mg  5 mg Oral Daily    ketotifen fumarate (ZADITOR) 0.035 % ophthalmic solution 1 drop  1 drop Both Eyes BID    sodium chloride flush 0.9 % injection 5-40 mL  5-40 mL IntraVENous 2 times per day    sodium chloride flush 0.9 % injection 5-40 mL  5-40 mL IntraVENous PRN    0.9 % sodium chloride infusion   IntraVENous PRN    potassium chloride 10 mEq/100 mL IVPB (Peripheral Line)  10 mEq IntraVENous PRN    magnesium sulfate 1000 mg in dextrose 5% 100 mL IVPB  1,000 mg IntraVENous PRN    prochlorperazine (COMPAZINE) tablet 10 mg  10 mg Oral Q6H PRN    Or    prochlorperazine (COMPAZINE) injection 10 mg  10 mg IntraVENous Q6H PRN    heparin (porcine) injection 5,000 Units  5,000 Units SubCUTAneous BID    simethicone (MYLICON) chewable tablet 80 mg  80 mg Oral Q6H PRN    HYDROmorphone (DILAUDID) injection 0.5 mg  0.5 mg IntraVENous Q3H PRN    HYDROmorphone (DILAUDID) injection 1 mg  1 mg IntraVENous Q3H PRN

## 2024-06-18 NOTE — PROGRESS NOTES
General Surgery Progress Note    6/18/2024    Admit Date: 6/6/2024 6/6/24  Open ventral hernia repair with onlay mesh. The diameter was 10 cm.  (Dr Crain)  Surgery  POD 12        Unable to tolerate FLD yesterday- vomiting x 1 episode  Still having BM's daily    Objective:     BP (!) 146/73   Pulse 85   Temp 98.2 °F (36.8 °C) (Oral)   Resp 19   Ht 1.651 m (5' 5\")   Wt 114.8 kg (253 lb)   SpO2 96%   BMI 42.10 kg/m²       Intake/Output Summary (Last 24 hours) at 6/18/2024 0818  Last data filed at 6/18/2024 0701  Gross per 24 hour   Intake 12 ml   Output 700 ml   Net -688 ml    NGT 1350ml yesterday    EXAM:  ABD soft, less distended, active BS'S. ML w CDI staples       Data Review    Recent Results (from the past 24 hour(s))   Basic Metabolic Panel    Collection Time: 06/18/24  3:43 AM   Result Value Ref Range    Sodium 138 136 - 145 mmol/L    Potassium 3.8 3.5 - 5.1 mmol/L    Chloride 102 98 - 107 mmol/L    CO2 16 (L) 20 - 28 mmol/L    Anion Gap 20 (H) 9 - 18 mmol/L    Glucose 129 (H) 70 - 99 mg/dL     (H) 8 - 23 MG/DL    Creatinine 10.00 (H) 0.60 - 1.10 MG/DL    Est, Glom Filt Rate 4 (L) >60 ml/min/1.73m2    Calcium 9.5 8.8 - 10.2 MG/DL   Magnesium    Collection Time: 06/18/24  3:43 AM   Result Value Ref Range    Magnesium 2.6 (H) 1.8 - 2.4 mg/dL   Phosphorus    Collection Time: 06/18/24  3:43 AM   Result Value Ref Range    Phosphorus 6.0 (H) 2.5 - 4.5 MG/DL        Principal Problem:    Ventral hernia without obstruction or gangrene  Active Problems:    Morbid obesity (HCC)  Resolved Problems:    * No resolved hospital problems. *        Plan:    1. Full liquid diet- vomiting x 1 yesterday. CT a/p w oral contrast yesterday - no obstruction. Still on TPN.  2. Miralax/Dulcolax every day  3. Patient talking to Nephrology about HD, now that creatinine is 4.  Patient and Family considering HD  5. Reglan    Removed NG-tube 6/16  ROBLES IRVING, APRN - CNP

## 2024-06-18 NOTE — PROGRESS NOTES
Physical Therapy Note:    Attempted to see patient this PM for physical therapy treatment  session. Patient is currently with another staff at this time. Will follow and re-attempt as schedule permits/patient available. Thank you,    Krista Michaud, [facility]     Rehab Caseload Tracker

## 2024-06-18 NOTE — PROGRESS NOTES
Comprehensive Nutrition Assessment    Type and Reason for Visit: Reassess  Malnutrition Screening Tool: Malnutrition Screen  Have you recently lost weight without trying?: 2 to 13 pounds (1 point)  Have you been eating poorly because of a decreased appetite?: Yes (1 point)  Malnutrition Screening Tool Score: 2  Consult for Parenteral Nutrition Management  (6/12 - Gen Surg)     Nutrition Recommendations/Plan:   Parenteral Nutrition:  Peripheral parenteral nutrition (Osmolarity 862)   Peripheral line infusion  Change: Dex 5%, 4.25% AA 1.44 L (60ml/hr)   Continue 250 ml 20% lipids 3x/week  Average to provide: 663 kcal/day (47% of needs), 61 grams of protein/day (97% of needs), 72 grams of CHO/day and 1440 ml of total volume/day.   Above regimen: Unable to meet calorie goal related to PPN  Patient on PPN x 7 days. Please consider obtaining central access to better meet patient calorie needs in next 24 hours if pt remains unable to successfully advance diet/take PO.   Electrolytes/L:   Sodium ( 70 meq NaAcetate and 35 meq NaCl), Potassium ( 5 meq KCl) Phosphorus (none), Calcium (4.5 meq), Magnesium none  ~2:1 Acetate:Chloride ratio due to ESRD  Additives per bag:   MVI, MTE  Thiamine 100 mg daily x 7 days (EOT 6/18)  Folic Acid 1 mg daily x 7 days (EOT 6/18)  Nutrition Related Medication Management:  Electrolyte Replacement Protocol PRN Deferred due to CrCl    IVF:  NA  Labs:   BMP daily  Mg daily x 7 days at initiation then MWF  Phos daily x 7 days at initiation then MWF    Triglyceride weekly on Thursday  Hepatic Panel weekly on Thursday  POC Glucoses/SSI Not indicated  Meals and Snacks:  Diet: Continue current order  Nutrition Supplement Therapy:  Medical food supplement therapy:  Discontinue Nepro three times per day (this provides 420 kcal and 19 grams protein per bottle) - pt refusing     Malnutrition Assessment:  Malnutrition Status: At risk for malnutrition (Comment) (NPO/CLD due to altered GI function)    No  lenard wasting    Nutrition Assessment:  Nutrition History: Visited with patient in room. Reports that she has been dealing with waxing and waning discomfort in her stomach over the past year. Typically eats 1 meal per day with snacks. Will drink Ensure and Boost at home to supplement her intake; but reports it \"runs right through her\". Patient does not endorse any changes in appetite/intake pta.      Do You Have Any Cultural, Restorationism, or Ethnic Food Preferences?: No   Weight History: 5/24/24: 254# (IM OV), 11/20/23: 253# (IM OV), 5/19/23: 265# (IM OV)  CBW: 259# (6/6 standing scale)   No significant weight changes.   Nutrition Background:       PMH: CKD5 not on dialysis, degenerative disk disease, fibromyalgia, SBO, obesity, IBS, HTN, HLD. Admitted with ventral hernia with gangrene. Underwent open ventral hernia repair 6/6. Additional findings throughout admission of hyperkalemia.  6/9: KUB impression: Multiple dilated loops of small bowel. Consistent with ileus versus partial small bowel obstruction recommend follow-up.  6/12 KUB impression: Postoperative ileus.  6/13: SBFT impression: No evidence of a complete small bowel obstruction.  Dilatation of multiple proximal small bowel loops, with delayed transit time to the colon.  This probably represents a postoperative ileus, although a partial small bowel obstruction is not excluded.  6/17 CT impression: The appearance suggest small bowel ileus, likely related to recent surgery.  Nutrition Interval:  Access: PIV placed 6/15 (R forearm 22 g), PIV placed 6/15 (left basilic 20 g)  6/9: Hyperkalemia resolved, episode of emesis overnight. Pt made NPO, NG placed to LIS 6/11: NG removed, patient with large episode of emesis overnight (~900 cc)    6/12: NG replaced and set to LIS. Dilute PPN initiated with lipids 3x per week initiated.  6/13: PN with slight increase in volume due to NG OP/emesis. Trial daily lipids.   6/14: Move back to lipids 3x per week. NG clamped. Pt

## 2024-06-19 ENCOUNTER — APPOINTMENT (OUTPATIENT)
Dept: INTERVENTIONAL RADIOLOGY/VASCULAR | Age: 79
DRG: 353 | End: 2024-06-19
Attending: SURGERY
Payer: MEDICARE

## 2024-06-19 LAB
ANION GAP SERPL CALC-SCNC: 18 MMOL/L (ref 9–18)
BUN SERPL-MCNC: 130 MG/DL (ref 8–23)
CALCIUM SERPL-MCNC: 9.2 MG/DL (ref 8.8–10.2)
CHLORIDE SERPL-SCNC: 102 MMOL/L (ref 98–107)
CO2 SERPL-SCNC: 17 MMOL/L (ref 20–28)
CREAT SERPL-MCNC: 10.4 MG/DL (ref 0.6–1.1)
ERYTHROCYTE [DISTWIDTH] IN BLOOD BY AUTOMATED COUNT: 14.9 % (ref 11.9–14.6)
GLUCOSE SERPL-MCNC: 134 MG/DL (ref 70–99)
HBV CORE AB SERPL QL IA: POSITIVE
HCT VFR BLD AUTO: 28.4 % (ref 35.8–46.3)
HGB BLD-MCNC: 8.7 G/DL (ref 11.7–15.4)
MAGNESIUM SERPL-MCNC: 2.4 MG/DL (ref 1.8–2.4)
MCH RBC QN AUTO: 26.9 PG (ref 26.1–32.9)
MCHC RBC AUTO-ENTMCNC: 30.6 G/DL (ref 31.4–35)
MCV RBC AUTO: 87.7 FL (ref 82–102)
MM INDURATION, POC: 0 MM (ref 0–5)
NRBC # BLD: 0 K/UL (ref 0–0.2)
PHOSPHATE SERPL-MCNC: 5.9 MG/DL (ref 2.5–4.5)
PLATELET # BLD AUTO: 274 K/UL (ref 150–450)
PMV BLD AUTO: 11.3 FL (ref 9.4–12.3)
POTASSIUM SERPL-SCNC: 3.8 MMOL/L (ref 3.5–5.1)
PPD, POC: NEGATIVE
RBC # BLD AUTO: 3.24 M/UL (ref 4.05–5.2)
SODIUM SERPL-SCNC: 137 MMOL/L (ref 136–145)
WBC # BLD AUTO: 16.5 K/UL (ref 4.3–11.1)

## 2024-06-19 PROCEDURE — 6360000002 HC RX W HCPCS: Performed by: NURSE PRACTITIONER

## 2024-06-19 PROCEDURE — C9113 INJ PANTOPRAZOLE SODIUM, VIA: HCPCS | Performed by: NURSE PRACTITIONER

## 2024-06-19 PROCEDURE — 83735 ASSAY OF MAGNESIUM: CPT

## 2024-06-19 PROCEDURE — 36415 COLL VENOUS BLD VENIPUNCTURE: CPT

## 2024-06-19 PROCEDURE — 6360000002 HC RX W HCPCS: Performed by: SURGERY

## 2024-06-19 PROCEDURE — 2500000003 HC RX 250 WO HCPCS: Performed by: SURGERY

## 2024-06-19 PROCEDURE — 76937 US GUIDE VASCULAR ACCESS: CPT

## 2024-06-19 PROCEDURE — 6360000002 HC RX W HCPCS: Performed by: RADIOLOGY

## 2024-06-19 PROCEDURE — 90935 HEMODIALYSIS ONE EVALUATION: CPT

## 2024-06-19 PROCEDURE — 77001 FLUOROGUIDE FOR VEIN DEVICE: CPT

## 2024-06-19 PROCEDURE — 76937 US GUIDE VASCULAR ACCESS: CPT | Performed by: RADIOLOGY

## 2024-06-19 PROCEDURE — 2500000003 HC RX 250 WO HCPCS: Performed by: RADIOLOGY

## 2024-06-19 PROCEDURE — 1100000000 HC RM PRIVATE

## 2024-06-19 PROCEDURE — 85027 COMPLETE CBC AUTOMATED: CPT

## 2024-06-19 PROCEDURE — 36556 INSERT NON-TUNNEL CV CATH: CPT | Performed by: RADIOLOGY

## 2024-06-19 PROCEDURE — 2580000003 HC RX 258: Performed by: SURGERY

## 2024-06-19 PROCEDURE — 6370000000 HC RX 637 (ALT 250 FOR IP): Performed by: INTERNAL MEDICINE

## 2024-06-19 PROCEDURE — 2580000003 HC RX 258: Performed by: NURSE PRACTITIONER

## 2024-06-19 PROCEDURE — 84100 ASSAY OF PHOSPHORUS: CPT

## 2024-06-19 PROCEDURE — 6370000000 HC RX 637 (ALT 250 FOR IP): Performed by: SURGERY

## 2024-06-19 PROCEDURE — 80048 BASIC METABOLIC PNL TOTAL CA: CPT

## 2024-06-19 PROCEDURE — 36558 INSERT TUNNELED CV CATH: CPT

## 2024-06-19 PROCEDURE — 77001 FLUOROGUIDE FOR VEIN DEVICE: CPT | Performed by: RADIOLOGY

## 2024-06-19 RX ORDER — LIDOCAINE HYDROCHLORIDE 20 MG/ML
INJECTION, SOLUTION INFILTRATION; PERINEURAL PRN
Status: COMPLETED | OUTPATIENT
Start: 2024-06-19 | End: 2024-06-19

## 2024-06-19 RX ORDER — OXYCODONE HYDROCHLORIDE 5 MG/1
5 TABLET ORAL EVERY 4 HOURS PRN
OUTPATIENT
Start: 2024-06-19

## 2024-06-19 RX ORDER — HEPARIN SODIUM 1000 [USP'U]/ML
INJECTION, SOLUTION INTRAVENOUS; SUBCUTANEOUS PRN
Status: DISCONTINUED | OUTPATIENT
Start: 2024-06-19 | End: 2024-06-21

## 2024-06-19 RX ADMIN — PANTOPRAZOLE SODIUM 40 MG: 40 INJECTION, POWDER, FOR SOLUTION INTRAVENOUS at 21:11

## 2024-06-19 RX ADMIN — PANTOPRAZOLE SODIUM 40 MG: 40 INJECTION, POWDER, FOR SOLUTION INTRAVENOUS at 09:28

## 2024-06-19 RX ADMIN — HEPARIN SODIUM 5000 UNITS: 5000 INJECTION INTRAVENOUS; SUBCUTANEOUS at 21:11

## 2024-06-19 RX ADMIN — SODIUM CHLORIDE, PRESERVATIVE FREE 10 ML: 5 INJECTION INTRAVENOUS at 09:28

## 2024-06-19 RX ADMIN — SODIUM BICARBONATE 650 MG TABLET 1300 MG: at 21:12

## 2024-06-19 RX ADMIN — SODIUM CHLORIDE, PRESERVATIVE FREE 10 ML: 5 INJECTION INTRAVENOUS at 21:12

## 2024-06-19 RX ADMIN — SODIUM BICARBONATE 650 MG TABLET 1300 MG: at 09:28

## 2024-06-19 RX ADMIN — KETOTIFEN FUMARATE 1 DROP: 0.25 SOLUTION/ DROPS OPHTHALMIC at 09:28

## 2024-06-19 RX ADMIN — OXYCODONE 10 MG: 5 TABLET ORAL at 09:32

## 2024-06-19 RX ADMIN — AMLODIPINE BESYLATE 10 MG: 10 TABLET ORAL at 09:48

## 2024-06-19 RX ADMIN — I.V. FAT EMULSION 250 ML: 20 EMULSION INTRAVENOUS at 17:20

## 2024-06-19 RX ADMIN — CETIRIZINE HYDROCHLORIDE 5 MG: 10 TABLET, FILM COATED ORAL at 09:26

## 2024-06-19 RX ADMIN — FLUTICASONE PROPIONATE 1 SPRAY: 50 SPRAY, METERED NASAL at 09:27

## 2024-06-19 RX ADMIN — CALCIUM GLUCONATE: 98 INJECTION, SOLUTION INTRAVENOUS at 17:07

## 2024-06-19 RX ADMIN — HEPARIN SODIUM 2600 UNITS: 1000 INJECTION, SOLUTION INTRAVENOUS; SUBCUTANEOUS at 08:20

## 2024-06-19 RX ADMIN — LIDOCAINE HYDROCHLORIDE 10 ML: 20 INJECTION, SOLUTION INFILTRATION; PERINEURAL at 08:13

## 2024-06-19 ASSESSMENT — PAIN - FUNCTIONAL ASSESSMENT
PAIN_FUNCTIONAL_ASSESSMENT: PREVENTS OR INTERFERES SOME ACTIVE ACTIVITIES AND ADLS
PAIN_FUNCTIONAL_ASSESSMENT: NONE - DENIES PAIN

## 2024-06-19 ASSESSMENT — PAIN DESCRIPTION - ONSET: ONSET: ON-GOING

## 2024-06-19 ASSESSMENT — PAIN DESCRIPTION - ORIENTATION: ORIENTATION: MID;LOWER

## 2024-06-19 ASSESSMENT — PAIN DESCRIPTION - DESCRIPTORS: DESCRIPTORS: ACHING;SHARP

## 2024-06-19 ASSESSMENT — PAIN DESCRIPTION - PAIN TYPE: TYPE: CHRONIC PAIN

## 2024-06-19 ASSESSMENT — PAIN SCALES - GENERAL: PAINLEVEL_OUTOF10: 10

## 2024-06-19 ASSESSMENT — PAIN DESCRIPTION - LOCATION: LOCATION: BACK

## 2024-06-19 ASSESSMENT — PAIN DESCRIPTION - FREQUENCY: FREQUENCY: CONTINUOUS

## 2024-06-19 NOTE — DIALYSIS
TRANSFER OUT- DIALYSIS    Hemodialysis treatment completed. PT ran 2.5 hours, without complications.    Patient alert and VS stable /73   Pulse 86   Temp 98.2 °F (36.8 °C) (Oral)   Resp 18   Ht 1.651 m (5' 5\")   Wt 112.5 kg (248 lb)   SpO2 98%   BMI 41.27 kg/m²        1 Kg removed.      Flushed both ports with 10 mL of NS.  CVC dressing clean, dry, and intact, tego caps intact, curos caps placed.      Meds given: 0.    RBCs given during dialysis: 0    Patient to 221 after dialysis.

## 2024-06-19 NOTE — PROGRESS NOTES
Comprehensive Nutrition Assessment    Type and Reason for Visit: Reassess  Malnutrition Screening Tool: Malnutrition Screen  Have you recently lost weight without trying?: 2 to 13 pounds (1 point)  Have you been eating poorly because of a decreased appetite?: Yes (1 point)  Malnutrition Screening Tool Score: 2  Consult for Parenteral Nutrition Management  (6/12 - Gen Surg)     Nutrition Recommendations/Plan:   Parenteral Nutrition:  Peripheral parenteral nutrition (Osmolarity 867)   Peripheral line infusion  Change: Dex 5%, 4.25% AA 1.44 L (60ml/hr)   Continue 250 ml 20% lipids 3x/week  Average to provide: 663 kcal/day (47% of needs), 61 grams of protein/day (89% of needs), 72 grams of CHO/day and 1547 ml of total volume/day.   Above regimen: Unable to meet calorie goal related to PPN  Patient on PPN x 8 days. Please consider obtaining central access to better meet patient calorie needs in next 24 hours if pt remains unable to successfully advance diet/take PO. Reviewed with Gen Surg NP.   Electrolytes/L:   Sodium ( 45 meq NaAcetate and 60 meq NaCl), Potassium ( 5 meq KCl) Phosphorus (none), Calcium (4.5 meq), Magnesium none  ~:1 Acetate:Chloride ratio due to ESRD now on HD. Nephrology following.   Additives per bag:   MVI, MTE  Thiamine Completed 6/18  Folic Acid Completed 6/18  Nutrition Related Medication Management:  Electrolyte Replacement Protocol PRN Deferred due to CrCl    IVF:  NA  Labs:   BMP daily  Mg daily x 7 days at initiation then MWF  Phos daily x 7 days at initiation then MWF    Triglyceride weekly on Thursday  Hepatic Panel weekly on Thursday  POC Glucoses/SSI Not indicated  Meals and Snacks:  Diet: Continue NPO and advance as medically appropriate     Malnutrition Assessment:  Malnutrition Status: At risk for malnutrition (Comment) (NPO/CLD due to altered GI function)    No lenard wasting    Nutrition Assessment:  Nutrition History: Visited with patient in room. Reports that she has been dealing

## 2024-06-19 NOTE — PROGRESS NOTES
TRANSFER - OUT REPORT:    Verbal report given to Theresa Pereyra RN on Triny Ruvalcaba being transferred to [facility] for routine progression of patient care     Report consisted of patient's Situation, Background, Assessment and Recommendations(SBAR).   Information from the following report(s) Nurse Handoff Report and Adult Overview was reviewed with the receiving nurse.  Opportunity for questions and clarification was provided.    Patient transported with: Tech.

## 2024-06-19 NOTE — PROGRESS NOTES
TRANSFER - IN REPORT:    Verbal report received from Dipak JOHNS on Triny Ruvalcaba  being received from IR  for routine post-op      Report consisted of patient's Situation, Background, Assessment and   Recommendations(SBAR).     Information from the following report(s) Adult Overview, Surgery Report, Intake/Output, MAR, and Recent Results was reviewed with the receiving nurse.    Opportunity for questions and clarification was provided.      Assessment completed upon patient's arrival to unit and care assumed.

## 2024-06-19 NOTE — BRIEF OP NOTE
[facility]  Department of Interventional Radiology  [phone]       Brief Procedure Note    Patient: Triny Ruvalcaba MRN: [id number]  SSN: [id number]    YOB: 1945  Age: 78 y.o.  Sex: female      Date of Procedure: 6/19/2024    Pre-Procedure Diagnosis: Acute on chronic renal failure.  Leukocytosis.     Post-Procedure Diagnosis: SAME    Procedure(s):  Temporary Central Venous Catheter    Brief Description of Procedure: RIJV    Performed By: LAURO CHAKRABORTY MD     Assistants: None    Anesthesia:Lidocaine    Estimated Blood Loss: Less than 10ml    Specimens:  None    Implants:  Temporary Hemodialysis Catheter    Findings: Tip in RA    Complications: None    Recommendations: Ready to use.      Follow Up: PRN    Signed By: LAURO CHAKRABORTY MD     June 19, 2024

## 2024-06-19 NOTE — DIALYSIS
TRANSFER IN - DIALYSIS    Received patient in dialysis unit  from [facility] (unit) for ordered procedure.    Consent verified for renal replacement therapy. Procedure explained to patient, opportunity for Q&A provided. Call light given.     Patient alert and vital signs stable.  /67,  P83  , room air.    Hemodialysis initiated using right IJ catheter.  Aspirated and flushed both ports without difficulty. Dressing clean, dry and intact.  Machine settings per MD order.    Heparin 0 unit bolus and 0 units/hr.      Will monitor during treatment.

## 2024-06-19 NOTE — CARE COORDINATION
CM reviewed chart.     Patient's discharge plan continues to be for patient to return home with [facility] once medically stable for discharge.     CM will continue to follow patient for any other discharge planning needs.

## 2024-06-19 NOTE — CONSULTS
Temporary HD cath placed secondary to elevated WBC per Dr Rome. Please reconsult for tunneled placement when appropriate.

## 2024-06-19 NOTE — PROGRESS NOTES
General Surgery Progress Note    6/19/2024    Admit Date: 6/6/2024 6/6/24  Open ventral hernia repair with onlay mesh. The diameter was 10 cm.  (Dr Crain)  Surgery  POD 13    Very little PO intake yesterday   Still having BM's daily    Just back from IR where Temp Cath was placed (not tunneled cath due to elevated WBC)    Objective:     BP (!) 150/67   Pulse 83   Temp 98.1 °F (36.7 °C) (Infrared)   Resp 20   Ht 1.651 m (5' 5\")   Wt 112.5 kg (248 lb)   SpO2 97%   BMI 41.27 kg/m²       Intake/Output Summary (Last 24 hours) at 6/19/2024 0859  Last data filed at 6/18/2024 1545  Gross per 24 hour   Intake 120 ml   Output 602 ml   Net -482 ml   Vomit 400ml yesterday  Last BM 6/18    EXAM:  ABD soft, less distended, active BS'S. ML w CDI staples       Data Review    Recent Results (from the past 24 hour(s))   Hepatitis Panel, Acute    Collection Time: 06/18/24  2:41 PM   Result Value Ref Range    Hep A IgM NONREACTIVE NR      Hep B Core Ab, IgM NONREACTIVE NR      Hepatitis B Surface Ag NONREACTIVE NR      Hepatitis C Ab NONREACTIVE NR     Hepatitis B Surface Antibody    Collection Time: 06/18/24  2:41 PM   Result Value Ref Range    Hep B S Ab 1727.00 mIU/mL   Hepatitis B Core Antibody, Total    Collection Time: 06/18/24  2:41 PM   Result Value Ref Range    Hep B Core Total Ab Positive (A) Negative     Basic Metabolic Panel    Collection Time: 06/19/24  4:31 AM   Result Value Ref Range    Sodium 137 136 - 145 mmol/L    Potassium 3.8 3.5 - 5.1 mmol/L    Chloride 102 98 - 107 mmol/L    CO2 17 (L) 20 - 28 mmol/L    Anion Gap 18 9 - 18 mmol/L    Glucose 134 (H) 70 - 99 mg/dL     (H) 8 - 23 MG/DL    Creatinine 10.40 (H) 0.60 - 1.10 MG/DL    Est, Glom Filt Rate 4 (L) >60 ml/min/1.73m2    Calcium 9.2 8.8 - 10.2 MG/DL   Magnesium    Collection Time: 06/19/24  4:31 AM   Result Value Ref Range    Magnesium 2.4 1.8 - 2.4 mg/dL   Phosphorus    Collection Time: 06/19/24  4:31 AM   Result Value Ref Range    Phosphorus

## 2024-06-19 NOTE — PROGRESS NOTES
US Guided PIV access    Called for assistance with IV access. Ultrasound was used to find the vein which was compressible and does not have any features of an artery or nerve bundle. Skin was cleaned and disinfected prior to IV puncture. Under real-time ultrasound guidance, peripheral access was obtained in the right forearm using 22 G 1.75\" Peripheral IV catheter x 2 attempt. Blood return was present and IV flushed without difficulty. IV dressing applied, no immediate complications noted, and patient tolerated the procedure well.

## 2024-06-19 NOTE — PROGRESS NOTES
IV sites both painful and little swollen.  Spoke with VAT as it is very difficult to find IV access with this pt. Instructed to remove both lines and VAT will find access today.

## 2024-06-19 NOTE — PROGRESS NOTES
Triny Ruvalcaba  Admission Date: 6/6/2024         [facility] Progress Note: 6/19/2024    Follow-up for: CKD V    The patient's chart is reviewed and the patient is discussed with the staff.    Subjective:   Pt seen and examined on HD-#1, 250 Qb, UF 1500 tolerating dialysis, abdominal pain better, she is off TPN.     ROS:  Gen - no fever, no chills  CV - no chest pain  Lung - no shortness of breath, no cough  Abd -  tenderness- better, no nausea/vomiting  Ext - + edema    Current Facility-Administered Medications   Medication Dose Route Frequency    lidocaine 2 % injection    PRN    heparin (porcine) injection    PRN    PN-Adult Premix 4.25/5 - Peripheral Line   IntraVENous Continuous TPN    PN-Adult Premix 4.25/5 - Peripheral Line   IntraVENous Continuous TPN    tuberculin injection 5 Units  5 Units IntraDERmal Once    diatrizoate meglumine-sodium (GASTROGRAFIN) 66-10 % solution 15 mL  15 mL Oral ONCE PRN    fat emulsion (INTRALIPID/NUTRILIPID) 20 % infusion 250 mL  250 mL IntraVENous Q MWF    pantoprazole (PROTONIX) 40 mg in sodium chloride (PF) 0.9 % 10 mL injection  40 mg IntraVENous Q12H    polyethylene glycol (GLYCOLAX) packet 17 g  17 g Oral Daily    bisacodyl (DULCOLAX) suppository 10 mg  10 mg Rectal Daily    hydrALAZINE (APRESOLINE) injection 10 mg  10 mg IntraVENous Q6H PRN    sodium bicarbonate tablet 1,300 mg  1,300 mg Oral BID    bisacodyl (DULCOLAX) suppository 10 mg  10 mg Rectal Daily PRN    amLODIPine (NORVASC) tablet 10 mg  10 mg Oral Daily    fluticasone (FLONASE) 50 MCG/ACT nasal spray 1 spray  1 spray Each Nostril Daily    cetirizine (ZYRTEC) tablet 5 mg  5 mg Oral Daily    ketotifen fumarate (ZADITOR) 0.035 % ophthalmic solution 1 drop  1 drop Both Eyes BID    sodium chloride flush 0.9 % injection 5-40 mL  5-40 mL IntraVENous 2 times per day    sodium chloride flush 0.9 % injection 5-40 mL  5-40 mL IntraVENous PRN    0.9 % sodium chloride infusion   IntraVENous  PRN    potassium chloride 10 mEq/100 mL IVPB (Peripheral Line)  10 mEq IntraVENous PRN    magnesium sulfate 1000 mg in dextrose 5% 100 mL IVPB  1,000 mg IntraVENous PRN    prochlorperazine (COMPAZINE) tablet 10 mg  10 mg Oral Q6H PRN    Or    prochlorperazine (COMPAZINE) injection 10 mg  10 mg IntraVENous Q6H PRN    heparin (porcine) injection 5,000 Units  5,000 Units SubCUTAneous BID    simethicone (MYLICON) chewable tablet 80 mg  80 mg Oral Q6H PRN    HYDROmorphone (DILAUDID) injection 0.5 mg  0.5 mg IntraVENous Q3H PRN    HYDROmorphone (DILAUDID) injection 1 mg  1 mg IntraVENous Q3H PRN    oxyCODONE (ROXICODONE) immediate release tablet 5 mg  5 mg Oral Q4H PRN    oxyCODONE (ROXICODONE) immediate release tablet 10 mg  10 mg Oral Q4H PRN         Objective:     Vitals:    06/19/24 0946 06/19/24 1049 06/19/24 1100 06/19/24 1130   BP: (!) 146/76 (!) 149/67 138/65 135/80   Pulse: 87 83 82 84   Resp: 18      Temp: 98.2 °F (36.8 °C)      TempSrc: Oral      SpO2: 98%      Weight:       Height:         Intake and Output:   06/17 1901 - 06/19 0700  In: 120 [P.O.:120]  Out: 1002 [Urine:600]  06/19 0701 - 06/19 1900  In: -   Out: 200 [Urine:200]    Physical Exam:   Constitutional:  the patient is well developed and in no acute distress, alert and following commands  HEENT:  Sclera clear, pupils equal, oral mucosa moist  Lungs: clear bilaterally   Cardiovascular:  Regular rate, S1, S2, no Rub   Abd/GI: soft and non-tender; with positive bowel sounds.  Ext: warm without cyanosis. There is + lower leg edema.  Skin:  no jaundice or rashes  Neuro: no gross neuro deficits   Psychiatric: Calm.   Access: Right non tunnel catheter intact, accessed      LAB  Recent Labs     06/17/24  0401 06/19/24  0431   WBC 18.8* 16.5*   HGB 9.5* 8.7*   HCT 31.7* 28.4*    274     Recent Labs     06/17/24  0401 06/18/24  0343 06/19/24  0431    138 137   K 4.1 3.8 3.8    102 102   CO2 16* 16* 17*   * 124* 130*   CREATININE

## 2024-06-19 NOTE — PROGRESS NOTES
Ms. Ruvalcaba is off the floor in dialysis.  Will try later as time allows.  DARVIN SHRESTHA, PT

## 2024-06-20 LAB
ALBUMIN SERPL-MCNC: 2.7 G/DL (ref 3.2–4.6)
ALBUMIN/GLOB SERPL: 0.5 (ref 1–1.9)
ALP SERPL-CCNC: 93 U/L (ref 35–104)
ALT SERPL-CCNC: 27 U/L (ref 12–65)
ANION GAP SERPL CALC-SCNC: 15 MMOL/L (ref 9–18)
AST SERPL-CCNC: 25 U/L (ref 15–37)
BILIRUB DIRECT SERPL-MCNC: <0.2 MG/DL (ref 0–0.4)
BILIRUB SERPL-MCNC: 0.4 MG/DL (ref 0–1.2)
BUN SERPL-MCNC: 84 MG/DL (ref 8–23)
CALCIUM SERPL-MCNC: 9.1 MG/DL (ref 8.8–10.2)
CHLORIDE SERPL-SCNC: 100 MMOL/L (ref 98–107)
CO2 SERPL-SCNC: 20 MMOL/L (ref 20–28)
CREAT SERPL-MCNC: 7.31 MG/DL (ref 0.6–1.1)
GLOBULIN SER CALC-MCNC: 5.2 G/DL (ref 2.3–3.5)
GLUCOSE SERPL-MCNC: 130 MG/DL (ref 70–99)
MAGNESIUM SERPL-MCNC: 1.9 MG/DL (ref 1.8–2.4)
MM INDURATION, POC: 0 MM (ref 0–5)
PHOSPHATE SERPL-MCNC: 4.6 MG/DL (ref 2.5–4.5)
POTASSIUM SERPL-SCNC: 3.4 MMOL/L (ref 3.5–5.1)
POTASSIUM SERPL-SCNC: 4.4 MMOL/L (ref 3.5–5.1)
PPD, POC: NEGATIVE
PROT SERPL-MCNC: 7.9 G/DL (ref 6.3–8.2)
SODIUM SERPL-SCNC: 135 MMOL/L (ref 136–145)
TRIGL SERPL-MCNC: 275 MG/DL (ref 0–150)

## 2024-06-20 PROCEDURE — 84132 ASSAY OF SERUM POTASSIUM: CPT

## 2024-06-20 PROCEDURE — 80048 BASIC METABOLIC PNL TOTAL CA: CPT

## 2024-06-20 PROCEDURE — 2580000003 HC RX 258: Performed by: SURGERY

## 2024-06-20 PROCEDURE — 80076 HEPATIC FUNCTION PANEL: CPT

## 2024-06-20 PROCEDURE — 6370000000 HC RX 637 (ALT 250 FOR IP): Performed by: SURGERY

## 2024-06-20 PROCEDURE — 6360000002 HC RX W HCPCS: Performed by: NURSE PRACTITIONER

## 2024-06-20 PROCEDURE — 84478 ASSAY OF TRIGLYCERIDES: CPT

## 2024-06-20 PROCEDURE — 90935 HEMODIALYSIS ONE EVALUATION: CPT

## 2024-06-20 PROCEDURE — C9113 INJ PANTOPRAZOLE SODIUM, VIA: HCPCS | Performed by: NURSE PRACTITIONER

## 2024-06-20 PROCEDURE — 6370000000 HC RX 637 (ALT 250 FOR IP): Performed by: INTERNAL MEDICINE

## 2024-06-20 PROCEDURE — 84100 ASSAY OF PHOSPHORUS: CPT

## 2024-06-20 PROCEDURE — 83735 ASSAY OF MAGNESIUM: CPT

## 2024-06-20 PROCEDURE — 36415 COLL VENOUS BLD VENIPUNCTURE: CPT

## 2024-06-20 PROCEDURE — 2580000003 HC RX 258: Performed by: NURSE PRACTITIONER

## 2024-06-20 PROCEDURE — 1100000000 HC RM PRIVATE

## 2024-06-20 PROCEDURE — 6360000002 HC RX W HCPCS: Performed by: SURGERY

## 2024-06-20 RX ORDER — POLYETHYLENE GLYCOL 3350 17 G/17G
17 POWDER, FOR SOLUTION ORAL DAILY PRN
Status: DISCONTINUED | OUTPATIENT
Start: 2024-06-20 | End: 2024-06-26 | Stop reason: HOSPADM

## 2024-06-20 RX ADMIN — POTASSIUM CHLORIDE 10 MEQ: 7.46 INJECTION, SOLUTION INTRAVENOUS at 09:58

## 2024-06-20 RX ADMIN — HEPARIN SODIUM 5000 UNITS: 5000 INJECTION INTRAVENOUS; SUBCUTANEOUS at 09:52

## 2024-06-20 RX ADMIN — KETOTIFEN FUMARATE 1 DROP: 0.25 SOLUTION/ DROPS OPHTHALMIC at 09:53

## 2024-06-20 RX ADMIN — POTASSIUM CHLORIDE 10 MEQ: 7.46 INJECTION, SOLUTION INTRAVENOUS at 08:16

## 2024-06-20 RX ADMIN — HEPARIN SODIUM 5000 UNITS: 5000 INJECTION INTRAVENOUS; SUBCUTANEOUS at 21:55

## 2024-06-20 RX ADMIN — SODIUM BICARBONATE 650 MG TABLET 1300 MG: at 21:56

## 2024-06-20 RX ADMIN — SODIUM CHLORIDE, PRESERVATIVE FREE 10 ML: 5 INJECTION INTRAVENOUS at 22:01

## 2024-06-20 RX ADMIN — POTASSIUM CHLORIDE 10 MEQ: 7.46 INJECTION, SOLUTION INTRAVENOUS at 07:20

## 2024-06-20 RX ADMIN — CETIRIZINE HYDROCHLORIDE 5 MG: 10 TABLET, FILM COATED ORAL at 09:52

## 2024-06-20 RX ADMIN — OXYCODONE 10 MG: 5 TABLET ORAL at 15:27

## 2024-06-20 RX ADMIN — PANTOPRAZOLE SODIUM 40 MG: 40 INJECTION, POWDER, FOR SOLUTION INTRAVENOUS at 09:52

## 2024-06-20 RX ADMIN — PANTOPRAZOLE SODIUM 40 MG: 40 INJECTION, POWDER, FOR SOLUTION INTRAVENOUS at 21:56

## 2024-06-20 RX ADMIN — FLUTICASONE PROPIONATE 1 SPRAY: 50 SPRAY, METERED NASAL at 09:53

## 2024-06-20 RX ADMIN — POTASSIUM CHLORIDE 10 MEQ: 7.46 INJECTION, SOLUTION INTRAVENOUS at 15:21

## 2024-06-20 RX ADMIN — AMLODIPINE BESYLATE 10 MG: 10 TABLET ORAL at 09:52

## 2024-06-20 RX ADMIN — SODIUM CHLORIDE, PRESERVATIVE FREE 10 ML: 5 INJECTION INTRAVENOUS at 10:46

## 2024-06-20 RX ADMIN — SODIUM BICARBONATE 650 MG TABLET 1300 MG: at 09:52

## 2024-06-20 RX ADMIN — POLYETHYLENE GLYCOL 3350 17 G: 17 POWDER, FOR SOLUTION ORAL at 09:53

## 2024-06-20 ASSESSMENT — PAIN DESCRIPTION - LOCATION: LOCATION: BACK

## 2024-06-20 ASSESSMENT — PAIN DESCRIPTION - ONSET: ONSET: ON-GOING

## 2024-06-20 ASSESSMENT — PAIN SCALES - GENERAL
PAINLEVEL_OUTOF10: 9
PAINLEVEL_OUTOF10: 5
PAINLEVEL_OUTOF10: 0
PAINLEVEL_OUTOF10: 0

## 2024-06-20 ASSESSMENT — PAIN DESCRIPTION - PAIN TYPE: TYPE: CHRONIC PAIN

## 2024-06-20 ASSESSMENT — PAIN DESCRIPTION - DESCRIPTORS: DESCRIPTORS: THROBBING

## 2024-06-20 ASSESSMENT — PAIN DESCRIPTION - FREQUENCY: FREQUENCY: CONTINUOUS

## 2024-06-20 ASSESSMENT — PAIN DESCRIPTION - ORIENTATION: ORIENTATION: POSTERIOR

## 2024-06-20 NOTE — PROGRESS NOTES
END OF SHIFT NOTE:    INTAKE/OUTPUT  06/19 0701 - 06/20 0700  In: 500   Out: 2000 [Urine:500]  Voiding: Yes  Catheter: No  Drain:   External Urinary Catheter (Active)   Site Assessment Clean,dry & intact 06/19/24 2000   Placement Replaced 06/19/24 0926   Securement Method Securing device (Describe) 06/20/24 0725   Catheter Care Catheter/Wick replaced 06/19/24 0926   Perineal Care Yes 06/19/24 0926   Suction 40 mmgHg continuous 06/20/24 0725   Urine Color Bre 06/19/24 0926   Urine Appearance Clear 06/19/24 0926   Output (mL) 200 mL 06/20/24 0313               Flatus: Patient does have flatus present.    Stool:  occurrences.    Characteristics:  Stool Appearance:  (No stool to assess this AM)  Stool Color: Green  Stool Amount: Medium  Stool Assessment  Incontinence: Yes  Stool Appearance:  (No stool to assess this AM)  Stool Color: Green  Stool Amount: Medium  Stool Source: Rectum  Last BM (including prior to admit): 06/19/24    Emesis:  occurrences.    Characteristics:   Emesis Appearance: Green    VITAL SIGNS  Patient Vitals for the past 12 hrs:   Temp Pulse Resp BP SpO2   06/20/24 1559 98.4 °F (36.9 °C) 95 18 130/75 97 %   06/20/24 1557 -- -- 16 -- --   06/20/24 1348 -- 92 -- 93/72 --   06/20/24 1300 -- 87 -- 121/68 --   06/20/24 1230 -- 87 -- 134/69 --   06/20/24 1200 -- 85 -- 108/76 --   06/20/24 1130 -- 81 -- 139/72 --   06/20/24 1100 -- 83 -- 106/63 --   06/20/24 1055 -- 85 -- (!) 143/63 --   06/20/24 0725 98.4 °F (36.9 °C) 86 17 (!) 140/64 94 %       Pain Assessment  Pain Level: 5 (06/20/24 1557)  Pain Location: Back  Patient's Stated Pain Goal: 5    Ambulating  No    Shift report given to oncoming nurse at the bedside.    Yvonne Conway RN

## 2024-06-20 NOTE — DIALYSIS
TRANSFER OUT- DIALYSIS    Hemodialysis treatment completed. PT ran 3 hours, without complications.    Patient A&O and VS stable  BP 93/72  P 92         1.5 Kg removed.      Flushed both ports with 10 mL of NS.  CVC dressing clean, dry, and intact, tego caps intact, curos caps placed.      Meds given: none.      Patient to 221 after dialysis.

## 2024-06-20 NOTE — PROGRESS NOTES
Comprehensive Nutrition Assessment    Type and Reason for Visit: Reassess  Malnutrition Screening Tool: Malnutrition Screen  Have you recently lost weight without trying?: 2 to 13 pounds (1 point)  Have you been eating poorly because of a decreased appetite?: Yes (1 point)  Malnutrition Screening Tool Score: 2  Consult for Parenteral Nutrition Management  (6/12 - Gen Surg)     Nutrition Recommendations/Plan:   Parenteral Nutrition:  Discontinued by surgery this am    Meals and Snacks:  Diet: Continue current order  Nutrition Supplement Therapy:  Medical food supplement therapy:  Initiate Magic Cup twice per day (this provides 290 kcal and 9 grams protein per serving)     Malnutrition Assessment:  Malnutrition Status: At risk for malnutrition (Comment) (NPO/CLD due to altered GI function)    No lenard wasting    Nutrition Assessment:  Nutrition History: Visited with patient in room. Reports that she has been dealing with waxing and waning discomfort in her stomach over the past year. Typically eats 1 meal per day with snacks. Will drink Ensure and Boost at home to supplement her intake; but reports it \"runs right through her\". Patient does not endorse any changes in appetite/intake pta.      Do You Have Any Cultural, Uatsdin, or Ethnic Food Preferences?: No   Weight History: 5/24/24: 254# (IM OV), 11/20/23: 253# (IM OV), 5/19/23: 265# (IM OV)  CBW: 259# (6/6 standing scale)   No significant weight changes.   Nutrition Background:       PMH: CKD5 not on dialysis, degenerative disk disease, fibromyalgia, SBO, obesity, IBS, HTN, HLD. Admitted with ventral hernia with gangrene. Underwent open ventral hernia repair 6/6. Additional findings throughout admission of hyperkalemia.  6/9: KUB impression: Multiple dilated loops of small bowel. Consistent with ileus versus partial small bowel obstruction recommend follow-up.  6/12 KUB impression: Postoperative ileus.  6/13: SBFT impression: No evidence of a complete small bowel  obstruction.  Dilatation of multiple proximal small bowel loops, with delayed transit time to the colon.  This probably represents a postoperative ileus, although a partial small bowel obstruction is not excluded.  6/17 CT impression: The appearance suggest small bowel ileus, likely related to recent surgery.  6/19: Temporary HD catheter placed. First HD treatment scheduled.   Nutrition Interval:  Access: PIV placed 6/15 (R forearm 22 g), PIV placed 6/15 (left basilic 20 g)  6/9: Hyperkalemia resolved, episode of emesis overnight. Pt made NPO, NG placed to LIS 6/11: NG removed, patient with large episode of emesis overnight (~900 cc)    6/12: NG replaced and set to LIS. Dilute PPN initiated with lipids 3x per week initiated.  6/13: PN with slight increase in volume due to NG OP/emesis. Trial daily lipids.   6/14: Move back to lipids 3x per week. NG clamped. Pt advanced to clears per GenSurgery. Episode of emesis overnight.   6/16: NG removed in PM after tolerating some clears. PN weaned to 1L with diet adv. Later, pt with episode of emesis overnight.     Patient discussed with Heide LANTIGUA NP prior to seeing. TPN d/c by NP. Patient seen later after HD. She states she was starving the morning but after HD her appetite went away. Discussed increased kcal and protein needs with HD. She declined all supplements until visitor at bedside convinced her to try Magic Cup.    Abdominal Status (last documented by nursing):   Last BM (including prior to admit): 06/19/24, GI Symptoms: Loss of appetite, Reduction in appetite   Recorded I/O x 24 hours: UOP: 600 cc with 1 unmeasured occurrence  Pertinent Medications:  dulcolax (held), protonix, glycolax (held- last admin 6/16), NaBicarb 1300 mg BID  Reglan d/c 6/16  Lokelma 5g x 3 6/7   Continuous: NA   IVF: NA   Electrolyte Replacement:  NA  Pertinent administered PRN: zofran (last admin 6/16), compazine (last admin 6/17), dulcolax (last admin 6/15)  Pertinent Labs:   Lab Results

## 2024-06-20 NOTE — PROGRESS NOTES
General Surgery Progress Note    6/20/2024    Admit Date: 6/6/2024 6/6/24  Open ventral hernia repair with onlay mesh. The diameter was 10 cm.  (Dr Crain)  Surgery  POD 14    HD yesterday - tolerated well  Has appetite      Objective:     BP (!) 140/64   Pulse 86   Temp 98.4 °F (36.9 °C) (Oral)   Resp 17   Ht 1.651 m (5' 5\")   Wt 117.3 kg (258 lb 11.2 oz)   SpO2 94%   BMI 43.05 kg/m²       Intake/Output Summary (Last 24 hours) at 6/20/2024 0939  Last data filed at 6/20/2024 0313  Gross per 24 hour   Intake 500 ml   Output 1800 ml   Net -1300 ml   Vomit 400ml yesterday  Last BM 6/18    EXAM:  ABD soft, less distended, active BS'S. ML w CDI staples- removing every other one       Data Review    Recent Results (from the past 24 hour(s))   PLEASE READ & DOCUMENT PPD TEST IN 24 HRS    Collection Time: 06/19/24  3:05 PM   Result Value Ref Range    PPD, (POC) Negative     mm Induration 0 0 - 5 mm   Basic Metabolic Panel    Collection Time: 06/20/24  3:55 AM   Result Value Ref Range    Sodium 135 (L) 136 - 145 mmol/L    Potassium 3.4 (L) 3.5 - 5.1 mmol/L    Chloride 100 98 - 107 mmol/L    CO2 20 20 - 28 mmol/L    Anion Gap 15 9 - 18 mmol/L    Glucose 130 (H) 70 - 99 mg/dL    BUN 84 (H) 8 - 23 MG/DL    Creatinine 7.31 (H) 0.60 - 1.10 MG/DL    Est, Glom Filt Rate 5 (L) >60 ml/min/1.73m2    Calcium 9.1 8.8 - 10.2 MG/DL   Magnesium    Collection Time: 06/20/24  3:55 AM   Result Value Ref Range    Magnesium 1.9 1.8 - 2.4 mg/dL   Phosphorus    Collection Time: 06/20/24  3:55 AM   Result Value Ref Range    Phosphorus 4.6 (H) 2.5 - 4.5 MG/DL   Hepatic Function Panel    Collection Time: 06/20/24  3:55 AM   Result Value Ref Range    Total Protein 7.9 6.3 - 8.2 g/dL    Albumin 2.7 (L) 3.2 - 4.6 g/dL    Globulin 5.2 (H) 2.3 - 3.5 g/dL    Albumin/Globulin Ratio 0.5 (L) 1.0 - 1.9      Total Bilirubin 0.4 0.0 - 1.2 MG/DL    Bilirubin, Direct <0.2 0.0 - 0.4 MG/DL    Alk Phosphatase 93 35 - 104 U/L    AST 25 15 - 37 U/L    ALT 27

## 2024-06-20 NOTE — PROGRESS NOTES
Triny Ruvalcaba  Admission Date: 6/6/2024         [facility] Progress Note: 6/20/2024    Follow-up for: CKD V    The patient's chart is reviewed and the patient is discussed with the staff.    Subjective:   Pt seen and examined on HD-#2, 300 Qb, UF 2000 tolerating dialysis, abdominal pain better, she is off TPN.     ROS:  Gen - no fever, no chills  CV - no chest pain  Lung - no shortness of breath, no cough  Abd -  tenderness- better, no nausea/vomiting  Ext - + edema    Current Facility-Administered Medications   Medication Dose Route Frequency    polyethylene glycol (GLYCOLAX) packet 17 g  17 g Oral Daily PRN    lidocaine 2 % injection    PRN    heparin (porcine) injection    PRN    diatrizoate meglumine-sodium (GASTROGRAFIN) 66-10 % solution 15 mL  15 mL Oral ONCE PRN    fat emulsion (INTRALIPID/NUTRILIPID) 20 % infusion 250 mL  250 mL IntraVENous Q MWF    pantoprazole (PROTONIX) 40 mg in sodium chloride (PF) 0.9 % 10 mL injection  40 mg IntraVENous Q12H    bisacodyl (DULCOLAX) suppository 10 mg  10 mg Rectal Daily    hydrALAZINE (APRESOLINE) injection 10 mg  10 mg IntraVENous Q6H PRN    sodium bicarbonate tablet 1,300 mg  1,300 mg Oral BID    bisacodyl (DULCOLAX) suppository 10 mg  10 mg Rectal Daily PRN    amLODIPine (NORVASC) tablet 10 mg  10 mg Oral Daily    fluticasone (FLONASE) 50 MCG/ACT nasal spray 1 spray  1 spray Each Nostril Daily    cetirizine (ZYRTEC) tablet 5 mg  5 mg Oral Daily    ketotifen fumarate (ZADITOR) 0.035 % ophthalmic solution 1 drop  1 drop Both Eyes BID    sodium chloride flush 0.9 % injection 5-40 mL  5-40 mL IntraVENous 2 times per day    sodium chloride flush 0.9 % injection 5-40 mL  5-40 mL IntraVENous PRN    0.9 % sodium chloride infusion   IntraVENous PRN    potassium chloride 10 mEq/100 mL IVPB (Peripheral Line)  10 mEq IntraVENous PRN    magnesium sulfate 1000 mg in dextrose 5% 100 mL IVPB  1,000 mg IntraVENous PRN    prochlorperazine (COMPAZINE)  tunnel catheter placed 6/18 she had elevated WBC and will need a tunnel catheter once WBC is improved  Seen on 1st HD 6/19, tolerating dialysis. HD again tomorrow then MWF schedule  Updated Hepatitis studies and PPD for outpt clinic she requested [facility], appreciate CW      2. Chronic metabolic acidosis - PO NaHCO3     3. Hyperkalemia - resolved     4. Anemia - iron deficient. Ferrlecit      5. Abdominal pain and distension: s/p pen ventral hernia repair with onlay mesh. The diameter was 10 cm. (Dr Crain) 6/6/24  -s/p TPN per General surgery   Leukocytosis without fever- CT A/P no abscess 6/17   Plan for full liquid diet per      6. HTN- continue CCB, UF with RRT       RITU López - NP  [facility]

## 2024-06-20 NOTE — CARE COORDINATION
CM reviewed chart.     Patient will need new outpatient HD slot and patient is requesting [facility] in [place]. CM sent referral accordingly to [facility] admissions, fax number: [phone].     CM to follow up.

## 2024-06-20 NOTE — DIALYSIS
TRANSFER IN - DIALYSIS    Received patient in dialysis unit  from [facility] (unit) for ordered procedure.    Consent verified for renal replacement therapy. Procedure explained to patient, opportunity for Q&A provided. Call light given.     Patient alert and vital signs stable.  BP (!) 140/64   Pulse 86   Temp 98.4 °F (36.9 °C) (Oral)   Resp 17   Ht 1.651 m (5' 5\")   Wt 117.3 kg (258 lb 11.2 oz)   SpO2 94%   BMI 43.05 kg/m²   Hemodialysis initiated using right cvc.  Aspirated and flushed both ports without difficulty. Dressing clean, dry and intact.  Machine settings per MD order.    Heparin 0 unit bolus and 0 units/hr.      Will monitor during treatment.

## 2024-06-20 NOTE — PROGRESS NOTES
Unfortunately Ms. Ruvalcaba is in dialysis again therefore PT has missed her again.  Will continue to try.  DARVIN SHRESTHA, PT

## 2024-06-21 LAB
ANION GAP SERPL CALC-SCNC: 15 MMOL/L (ref 9–18)
BASOPHILS # BLD: 0.1 K/UL (ref 0–0.2)
BASOPHILS NFR BLD: 1 % (ref 0–2)
BUN SERPL-MCNC: 55 MG/DL (ref 8–23)
CALCIUM SERPL-MCNC: 9.1 MG/DL (ref 8.8–10.2)
CHLORIDE SERPL-SCNC: 101 MMOL/L (ref 98–107)
CO2 SERPL-SCNC: 24 MMOL/L (ref 20–28)
CREAT SERPL-MCNC: 6.04 MG/DL (ref 0.6–1.1)
DIFFERENTIAL METHOD BLD: ABNORMAL
EOSINOPHIL # BLD: 0.3 K/UL (ref 0–0.8)
EOSINOPHIL NFR BLD: 2 % (ref 0.5–7.8)
ERYTHROCYTE [DISTWIDTH] IN BLOOD BY AUTOMATED COUNT: 14.5 % (ref 11.9–14.6)
GLUCOSE SERPL-MCNC: 123 MG/DL (ref 70–99)
HCT VFR BLD AUTO: 38.7 % (ref 35.8–46.3)
HGB BLD-MCNC: 10.9 G/DL (ref 11.7–15.4)
IMM GRANULOCYTES # BLD AUTO: 0.8 K/UL (ref 0–0.5)
IMM GRANULOCYTES NFR BLD AUTO: 5 % (ref 0–5)
LYMPHOCYTES # BLD: 1.6 K/UL (ref 0.5–4.6)
LYMPHOCYTES NFR BLD: 10 % (ref 13–44)
MAGNESIUM SERPL-MCNC: 2.2 MG/DL (ref 1.8–2.4)
MCH RBC QN AUTO: 26.9 PG (ref 26.1–32.9)
MCHC RBC AUTO-ENTMCNC: 28.2 G/DL (ref 31.4–35)
MCV RBC AUTO: 95.6 FL (ref 82–102)
MONOCYTES # BLD: 0.8 K/UL (ref 0.1–1.3)
MONOCYTES NFR BLD: 5 % (ref 4–12)
NEUTS SEG # BLD: 12.8 K/UL (ref 1.7–8.2)
NEUTS SEG NFR BLD: 78 % (ref 43–78)
NRBC # BLD: 0 K/UL (ref 0–0.2)
PHOSPHATE SERPL-MCNC: 5 MG/DL (ref 2.5–4.5)
PLATELET # BLD AUTO: 251 K/UL (ref 150–450)
PMV BLD AUTO: 11 FL (ref 9.4–12.3)
POTASSIUM SERPL-SCNC: 4.4 MMOL/L (ref 3.5–5.1)
RBC # BLD AUTO: 4.05 M/UL (ref 4.05–5.2)
SODIUM SERPL-SCNC: 140 MMOL/L (ref 136–145)
WBC # BLD AUTO: 16.6 K/UL (ref 4.3–11.1)

## 2024-06-21 PROCEDURE — 85025 COMPLETE CBC W/AUTO DIFF WBC: CPT

## 2024-06-21 PROCEDURE — 2580000003 HC RX 258: Performed by: SURGERY

## 2024-06-21 PROCEDURE — 80048 BASIC METABOLIC PNL TOTAL CA: CPT

## 2024-06-21 PROCEDURE — 1100000000 HC RM PRIVATE

## 2024-06-21 PROCEDURE — 90935 HEMODIALYSIS ONE EVALUATION: CPT

## 2024-06-21 PROCEDURE — 36415 COLL VENOUS BLD VENIPUNCTURE: CPT

## 2024-06-21 PROCEDURE — 6360000002 HC RX W HCPCS: Performed by: NURSE PRACTITIONER

## 2024-06-21 PROCEDURE — 97535 SELF CARE MNGMENT TRAINING: CPT

## 2024-06-21 PROCEDURE — 97165 OT EVAL LOW COMPLEX 30 MIN: CPT

## 2024-06-21 PROCEDURE — 6370000000 HC RX 637 (ALT 250 FOR IP): Performed by: INTERNAL MEDICINE

## 2024-06-21 PROCEDURE — 6360000002 HC RX W HCPCS: Performed by: SURGERY

## 2024-06-21 PROCEDURE — 83735 ASSAY OF MAGNESIUM: CPT

## 2024-06-21 PROCEDURE — 97530 THERAPEUTIC ACTIVITIES: CPT

## 2024-06-21 PROCEDURE — 2580000003 HC RX 258: Performed by: NURSE PRACTITIONER

## 2024-06-21 PROCEDURE — C9113 INJ PANTOPRAZOLE SODIUM, VIA: HCPCS | Performed by: NURSE PRACTITIONER

## 2024-06-21 PROCEDURE — 84100 ASSAY OF PHOSPHORUS: CPT

## 2024-06-21 PROCEDURE — 6370000000 HC RX 637 (ALT 250 FOR IP): Performed by: SURGERY

## 2024-06-21 RX ORDER — HEPARIN SODIUM 1000 [USP'U]/ML
5000 INJECTION, SOLUTION INTRAVENOUS; SUBCUTANEOUS PRN
Status: DISCONTINUED | OUTPATIENT
Start: 2024-06-21 | End: 2024-06-26 | Stop reason: HOSPADM

## 2024-06-21 RX ADMIN — HEPARIN SODIUM 5000 UNITS: 5000 INJECTION INTRAVENOUS; SUBCUTANEOUS at 20:46

## 2024-06-21 RX ADMIN — KETOTIFEN FUMARATE 1 DROP: 0.25 SOLUTION/ DROPS OPHTHALMIC at 10:24

## 2024-06-21 RX ADMIN — PROCHLORPERAZINE EDISYLATE 10 MG: 5 INJECTION INTRAMUSCULAR; INTRAVENOUS at 14:07

## 2024-06-21 RX ADMIN — AMLODIPINE BESYLATE 10 MG: 10 TABLET ORAL at 10:23

## 2024-06-21 RX ADMIN — SODIUM BICARBONATE 650 MG TABLET 1300 MG: at 10:23

## 2024-06-21 RX ADMIN — PANTOPRAZOLE SODIUM 40 MG: 40 INJECTION, POWDER, FOR SOLUTION INTRAVENOUS at 10:23

## 2024-06-21 RX ADMIN — SODIUM CHLORIDE, PRESERVATIVE FREE 10 ML: 5 INJECTION INTRAVENOUS at 20:51

## 2024-06-21 RX ADMIN — SODIUM BICARBONATE 650 MG TABLET 1300 MG: at 20:46

## 2024-06-21 RX ADMIN — HEPARIN SODIUM 5000 UNITS: 5000 INJECTION INTRAVENOUS; SUBCUTANEOUS at 10:23

## 2024-06-21 RX ADMIN — PANTOPRAZOLE SODIUM 40 MG: 40 INJECTION, POWDER, FOR SOLUTION INTRAVENOUS at 22:41

## 2024-06-21 RX ADMIN — FLUTICASONE PROPIONATE 1 SPRAY: 50 SPRAY, METERED NASAL at 10:24

## 2024-06-21 RX ADMIN — HEPARIN SODIUM 5000 UNITS: 1000 INJECTION INTRAVENOUS; SUBCUTANEOUS at 07:19

## 2024-06-21 RX ADMIN — SODIUM CHLORIDE, PRESERVATIVE FREE 10 ML: 5 INJECTION INTRAVENOUS at 10:24

## 2024-06-21 RX ADMIN — CETIRIZINE HYDROCHLORIDE 5 MG: 10 TABLET, FILM COATED ORAL at 10:23

## 2024-06-21 NOTE — PROGRESS NOTES
END OF SHIFT NOTE:    INTAKE/OUTPUT  06/20 0701 - 06/21 0700  In: 5269   Out: 2250 [Urine:250]  Voiding: Yes  Catheter: No  Drain:   External Urinary Catheter (Active)   Site Assessment Clean,dry & intact 06/19/24 2000   Placement Replaced 06/19/24 0926   Securement Method Securing device (Describe) 06/20/24 0725   Catheter Care Catheter/Wick replaced 06/19/24 0926   Perineal Care Yes 06/19/24 0926   Suction 40 mmgHg continuous 06/20/24 0725   Urine Color Rbe 06/19/24 0926   Urine Appearance Clear 06/19/24 0926   Output (mL) 200 mL 06/20/24 0313               Flatus: Patient does have flatus present.    Stool:  2 occurrences.    Characteristics:  Stool Appearance:  (No stool to assess this AM)  Stool Color: Green  Stool Amount: Medium  Stool Assessment  Incontinence: Yes  Stool Appearance:  (No stool to assess this AM)  Stool Color: Green  Stool Amount: Medium  Stool Source: Rectum  Last BM (including prior to admit): 06/20/24    Emesis:  0 occurrences.    Characteristics:   Emesis Appearance: Green    VITAL SIGNS  Patient Vitals for the past 12 hrs:   Temp Pulse Resp BP SpO2   06/21/24 0334 99.1 °F (37.3 °C) 93 18 118/65 95 %   06/20/24 2240 99.5 °F (37.5 °C) 93 18 134/86 93 %   06/20/24 2039 98.4 °F (36.9 °C) 90 18 130/72 96 %       Pain Assessment  Pain Level: 5 (06/20/24 1557)  Pain Location: Back  Patient's Stated Pain Goal: 5    Ambulating  Yes    Shift report given to oncoming nurse at the bedside.    Chantelle De La Garza RN

## 2024-06-21 NOTE — DIALYSIS
TRANSFER OUT- DIALYSIS    Hemodialysis treatment completed. PT ran 3 hours, without complications.    Patient alert and VS Stable  /70  P 95       1.5 Kg removed.      Flushed both ports with 10 mL of NS.  CVC dressing clean, dry, and intact, tego caps intact, curos caps placed.      Meds given: 0.    RBCs given during dialysis: 0    Patient to 221 after dialysis.

## 2024-06-21 NOTE — PROGRESS NOTES
Admit Date: 2024    POD 15 Days Post-Op    Procedure:  Procedure(s):  OPEN VENTRAL HERNIA REPAIR W/ MESH    Postoperative ileus POD2-11  TPN since 24  Dialysis since 24  Subjective:     A/o x 4 with NAD noted.  Respirations even and unlabored on room air.  Tolerating regular consistency low sodium, low potassium, low phosphorus diet with no nausea or vomiting.  Positive flatus.  BM x 2 past 24 H.  Oliguria-dialysis T//  Tmax 99.5 VSS  WBC 16.5 from 18.8-no abx  Objective:       Vitals:    24 0900 24 0930 24 0959 24 1019   BP: 121/66 121/67 137/70 124/66   Pulse: 93 95 95 98   Resp:    17   Temp:    98.1 °F (36.7 °C)   TempSrc:    Oral   SpO2:    95%   Weight:       Height:           Temp (24hrs), Av.7 °F (37.1 °C), Min:98.1 °F (36.7 °C), Max:99.5 °F (37.5 °C)  .  I&O reviewed as documented.    Physical Exam  Constitutional:       General: She is not in acute distress.  HENT:      Mouth/Throat:      Mouth: Mucous membranes are moist.   Cardiovascular:      Rate and Rhythm: Normal rate and regular rhythm.      Pulses: Normal pulses.      Heart sounds: Normal heart sounds. No murmur heard.     No friction rub. No gallop.   Pulmonary:      Effort: Pulmonary effort is normal. No respiratory distress.      Breath sounds: Normal breath sounds.   Abdominal:      General: Bowel sounds are normal. There is no distension.      Palpations: Abdomen is soft.   Genitourinary:     Comments: Dialysis T/TH/Sa-oliguria  Musculoskeletal:         General: Swelling present. Normal range of motion.      Cervical back: No rigidity.      Comments: Trace edema BLE BUE   Skin:     General: Skin is warm and dry.      Capillary Refill: Capillary refill takes less than 2 seconds.      Comments: Midline abdominal incision with no signs of infection   Neurological:      Mental Status: She is alert and oriented to person, place, and time.   Psychiatric:         Behavior: Behavior normal.          Labs:

## 2024-06-21 NOTE — PROGRESS NOTES
END OF SHIFT NOTE:    INTAKE/OUTPUT  06/20 0701 - 06/21 0700  In: 5269   Out: 2250 [Urine:250]  Voiding: NO  Catheter: No  Drain:   External Urinary Catheter (Active)   Site Assessment Clean,dry & intact 06/19/24 2000   Placement Replaced 06/19/24 0926   Securement Method Securing device (Describe) 06/20/24 0725   Catheter Care Catheter/Wick replaced 06/19/24 0926   Perineal Care Yes 06/19/24 0926   Suction 40 mmgHg continuous 06/20/24 0725   Urine Color Bre 06/19/24 0926   Urine Appearance Clear 06/19/24 0926   Output (mL)  mL 06/20/24 0313               Flatus: Patient does have flatus present.    Stool:  2 occurrences.    Characteristics:  Stool Appearance:  (No stool to assess this AM)  Stool Color: Green  Stool Amount: Medium  Stool Assessment  Incontinence: Yes  Stool Appearance:  (No stool to assess this AM)  Stool Color: Green  Stool Amount: Medium  Stool Source: Rectum  Last BM (including prior to admit): 06/20/24    Emesis:  occurrences.    Characteristics:   Emesis Appearance: Green    VITAL SIGNS  Patient Vitals for the past 12 hrs:   Temp Pulse Resp BP SpO2   06/21/24 1500 99 °F (37.2 °C) (!) 106 17 130/78 92 %   06/21/24 1019 98.1 °F (36.7 °C) 98 17 124/66 95 %   06/21/24 0959 -- 95 -- 137/70 --   06/21/24 0930 -- 95 -- 121/67 --   06/21/24 0900 -- 93 -- 121/66 --   06/21/24 0830 -- 92 -- 124/68 --   06/21/24 0800 -- 92 -- 120/68 --   06/21/24 0730 -- 91 -- 115/63 --       Pain Assessment  Pain Level: 5 (06/20/24 1557)  Pain Location: Back  Patient's Stated Pain Goal: 5    Ambulating  Yes    Shift report given to oncoming nurse at the bedside.    Gary Weber RN

## 2024-06-21 NOTE — CARE COORDINATION
PT is recommending [facility]. CM spoke to patient and patient's niece at bedside. Their first and only choice at this time is [facility]. CM sent referral accordingly and explained potential barriers to patient due to insurance.     CM gave patient [facility] choice list for some back up choices.

## 2024-06-21 NOTE — CARE COORDINATION
[facility] called CM and informed CM that patient has been approved for an outpatient HD slot T,TH, Sat schedule at 9:15 A.M.    CM will need to follow up with [facility] regarding a start date.

## 2024-06-21 NOTE — DIALYSIS
TRANSFER IN - DIALYSIS    Received patient in dialysis unit  from [facility] (unit) for ordered procedure.    Consent verified for renal replacement therapy. Procedure explained to patient, opportunity for Q&A provided. Call light given.     Patient alert and vital signs stable.  /79,  P91  , room air.    Hemodialysis initiated using right Ij catheter.  Aspirated and flushed both ports without difficulty. Dressing clean, dry and intact.  Machine settings per MD order.    Heparin 1000 unit bolus and 1000 units/hr.      Will monitor during treatment.

## 2024-06-21 NOTE — PROGRESS NOTES
ACUTE PHYSICAL THERAPY GOALS:   (Developed with and agreed upon by patient and/or caregiver.)    (1.)Ms. Ruvalcaba will move from supine to sit and sit to supine  in bed with MODIFIED INDEPENDENCE within 7 treatment day(s).    (2.)Ms. Ruvalcaba will transfer from bed to chair and chair to bed with MODIFIED INDEPENDENCE using the least restrictive device within 7 treatment day(s).    (3.)Ms. Ruvalcaba will ambulate with MODIFIED INDEPENDENCE for 500 feet with the least restrictive device within 7 treatment day(s)    PHYSICAL THERAPY: Daily Note PM   (Link to Caseload Tracking: PT Visit Days : 6  Time In/Out PT Charge Capture  Rehab Caseload Tracker  Orders    Triny Ruvalcaba is a 78 y.o. female   PRIMARY DIAGNOSIS: Ventral hernia without obstruction or gangrene  Ventral hernia without obstruction or gangrene [K43.9]  Morbid obesity (HCC) [E66.01]  Procedure(s) (LRB):  OPEN VENTRAL HERNIA REPAIR W/ MESH (N/A)  15 Days Post-Op  Inpatient: Payor: [employer] MEDICARE / Plan: [employer] GOLD PLUS HMO / Product Type: *No Product type* /     ASSESSMENT:     REHAB RECOMMENDATIONS:   Recommendation to date pending progress:  Setting:  Short-term Rehab    Equipment:    To Be Determined     ASSESSMENT:  Ms. Ruvalcaba is supine in bed and agreeable to therapy, friend at the bedside.   With bed flat she rolled and came to sit with min A and lots of additional time and cueing.   Upon sitting edge of bed, patient became very nauseated and began vomiting.  Notified RN who brought patient medication.    She stood to RW and was able to take a few steps to recliner with min A.  Attempted exercises but patient feeling too nauseated to participate much.   Patient is left in the recliner, positioned for comfort with needs within reach.  Patient did demonstrate progress with bed mobility today, although treatment was limited by N/V.   Continue PT efforts.  Patient has spent a prolonged time in acute care and far from baseline.  Will change d/c

## 2024-06-21 NOTE — PROGRESS NOTES
Triny Ruvalcaba  Admission Date: 6/6/2024         [facility] Progress Note: 6/21/2024    Follow-up for: CKD V    The patient's chart is reviewed and the patient is discussed with the staff.    Subjective:   Pt seen and examined on HD-#3, 300 Qb, UF 2000 tolerating dialysis. Right CVC functioning well.     ROS:  Gen - no fever, no chills  CV - no chest pain  Lung - no shortness of breath, no cough  Abd -  tenderness- better, no nausea/vomiting  Ext - + edema    Current Facility-Administered Medications   Medication Dose Route Frequency    heparin (porcine) injection 5,000 Units  5,000 Units IntraCATHeter PRN    polyethylene glycol (GLYCOLAX) packet 17 g  17 g Oral Daily PRN    lidocaine 2 % injection    PRN    diatrizoate meglumine-sodium (GASTROGRAFIN) 66-10 % solution 15 mL  15 mL Oral ONCE PRN    fat emulsion (INTRALIPID/NUTRILIPID) 20 % infusion 250 mL  250 mL IntraVENous Q MWF    pantoprazole (PROTONIX) 40 mg in sodium chloride (PF) 0.9 % 10 mL injection  40 mg IntraVENous Q12H    bisacodyl (DULCOLAX) suppository 10 mg  10 mg Rectal Daily    hydrALAZINE (APRESOLINE) injection 10 mg  10 mg IntraVENous Q6H PRN    sodium bicarbonate tablet 1,300 mg  1,300 mg Oral BID    bisacodyl (DULCOLAX) suppository 10 mg  10 mg Rectal Daily PRN    amLODIPine (NORVASC) tablet 10 mg  10 mg Oral Daily    fluticasone (FLONASE) 50 MCG/ACT nasal spray 1 spray  1 spray Each Nostril Daily    cetirizine (ZYRTEC) tablet 5 mg  5 mg Oral Daily    ketotifen fumarate (ZADITOR) 0.035 % ophthalmic solution 1 drop  1 drop Both Eyes BID    sodium chloride flush 0.9 % injection 5-40 mL  5-40 mL IntraVENous 2 times per day    sodium chloride flush 0.9 % injection 5-40 mL  5-40 mL IntraVENous PRN    0.9 % sodium chloride infusion   IntraVENous PRN    potassium chloride 10 mEq/100 mL IVPB (Peripheral Line)  10 mEq IntraVENous PRN    magnesium sulfate 1000 mg in dextrose 5% 100 mL IVPB  1,000 mg IntraVENous PRN

## 2024-06-21 NOTE — PROGRESS NOTES
ACUTE OCCUPATIONAL THERAPY GOALS:   (Developed with and agreed upon by patient and/or caregiver.)  1. Pt will complete LB ADL supervision with AE as needed.   2. Pt will complete toileting supervision with AE as needed.   3. Pt will complete UB ADL supervision.  4. Pt will tolerate 25 minutes of OT treatment requiring 1-2 breaks as needed.   5. Pt will complete grooming tasks while standing at sink supervision.  6. Pt will complete functional mobility via RW supervision.   7. Pt will tolerate BUE exercises to increase strength for safe, functional transfers and/or functional mobility, and ADL participation.     Timeframe: 7 days      OCCUPATIONAL THERAPY Initial Assessment, Daily Note, and PM       OT Visit Days: 1  Acknowledge Orders  Time  OT Charge Capture  Rehab Caseload Tracker      Triny Ruvalcaba is a 78 y.o. female   PRIMARY DIAGNOSIS: Ventral hernia without obstruction or gangrene  Ventral hernia without obstruction or gangrene [K43.9]  Morbid obesity (HCC) [E66.01]  Procedure(s) (LRB):  OPEN VENTRAL HERNIA REPAIR W/ MESH (N/A)  15 Days Post-Op  Reason for Referral: Generalized Muscle Weakness (M62.81)  Inpatient: Payor: [employer] MEDICARE / Plan: [employer] G[employer]US HMO / Product Type: *No Product type* /     ASSESSMENT:     REHAB RECOMMENDATIONS:   Recommendation to date pending progress:  Setting:  Home Health Therapy    Equipment:    To Be Determined     ASSESSMENT:  Ms. Ruvalcaba is a 78 y F who was originally admitted for s/p open ventral hernia repair with mesh on 6/6/24. Pt with n/v today. Pt was received sitting up in chair with family present. Pt required assistance for functional mobility and ADLs today due to n/v, slight unsteadiness, general malaise. Pt has deficits in strength, balance, activity tolerance, and performing ADLs. Pt requires continued skilled OT services due to performing functionally below baseline.       [facility] AM-PAC™ “6 Clicks” Daily Activity Inpatient Short Form:     Rolling [] [] [] [] [] [] [] [] [] [] []    Supine to Sit [] [] [] [] [] [] [] [] [] [] []    Scooting [] [] [] [] [] [] [] [] [] [] []    Sit to Supine [] [] [] [] [] [] [] [] [] [] []    Transfers    Sit to Stand [] [] [] [] [x] [] [] [] [] [] []    Bed to Chair [] [] [] [] [] [] [] [] [] [] []    Stand to Sit [] [] [] [] [x] [] [] [] [] [] []    Tub/Shower [] [] [] [] [] [] [] [] [] [] []     Toilet [] [] [] [] [x] [] [] [] [] [] []  BSC    [] [] [] [] [] [] [] [] [] [] []    I=Independent, Mod I=Modified Independent, S=Supervision/Setup, SBA=Standby Assistance, CGA=Contact Guard Assistance, Min=Minimal Assistance, Mod=Moderate Assistance, Max=Maximal Assistance, Total=Total Assistance, NT=Not Tested    ACTIVITIES OF DAILY LIVING: I Mod I S SBA CGA Min Mod Max Total NT Comments   BASIC ADLs:              Upper Body Bathing  [] [] [] [] [] [] [] [] [] []     Lower Body Bathing [] [] [] [] [] [] [] [] [] []     Toileting [] [] [] [x] [x] [] [] [] [] [] Bedside commode, BM; A for dynamic standing balance during pericare   Upper Body Dressing [] [] [] [] [] [] [] [] [] []    Lower Body Dressing [] [] [] [] [] [] [] [] [] []    Feeding [x] [] [x] [] [] [] [] [] [] [] Supported sitting ice via spoon/cup   Grooming [] [] [] [] [] [] [] [] [] []    Personal Device Care [] [] [] [] [] [] [] [] [] []    Functional Mobility [] [] [] [] [] [] [] [] [] [] Chair <=> bedside commode via RW   I=Independent, Mod I=Modified Independent, S=Supervision/Setup, SBA=Standby Assistance, CGA=Contact Guard Assistance, Min=Minimal Assistance, Mod=Moderate Assistance, Max=Maximal Assistance, Total=Total Assistance, NT=Not Tested    PLAN:   FREQUENCY/DURATION   OT Plan of Care: 3 times/week for duration of hospital stay or until stated goals are met, whichever comes first.    PROBLEM LIST:   (Skilled intervention is medically necessary to address:)  Decreased ADL/Functional Activities  Decreased Activity Tolerance  Decreased

## 2024-06-22 LAB
ANION GAP SERPL CALC-SCNC: 16 MMOL/L (ref 9–18)
BUN SERPL-MCNC: 41 MG/DL (ref 8–23)
CALCIUM SERPL-MCNC: 9.4 MG/DL (ref 8.8–10.2)
CHLORIDE SERPL-SCNC: 97 MMOL/L (ref 98–107)
CO2 SERPL-SCNC: 24 MMOL/L (ref 20–28)
CREAT SERPL-MCNC: 6.04 MG/DL (ref 0.6–1.1)
GLUCOSE SERPL-MCNC: 137 MG/DL (ref 70–99)
POTASSIUM SERPL-SCNC: 3.5 MMOL/L (ref 3.5–5.1)
SODIUM SERPL-SCNC: 137 MMOL/L (ref 136–145)

## 2024-06-22 PROCEDURE — 6360000002 HC RX W HCPCS: Performed by: NURSE PRACTITIONER

## 2024-06-22 PROCEDURE — 80048 BASIC METABOLIC PNL TOTAL CA: CPT

## 2024-06-22 PROCEDURE — 36415 COLL VENOUS BLD VENIPUNCTURE: CPT

## 2024-06-22 PROCEDURE — C9113 INJ PANTOPRAZOLE SODIUM, VIA: HCPCS | Performed by: NURSE PRACTITIONER

## 2024-06-22 PROCEDURE — 2580000003 HC RX 258: Performed by: NURSE PRACTITIONER

## 2024-06-22 PROCEDURE — 2580000003 HC RX 258: Performed by: SURGERY

## 2024-06-22 PROCEDURE — 6360000002 HC RX W HCPCS: Performed by: SURGERY

## 2024-06-22 PROCEDURE — 6370000000 HC RX 637 (ALT 250 FOR IP): Performed by: SURGERY

## 2024-06-22 PROCEDURE — 1100000000 HC RM PRIVATE

## 2024-06-22 PROCEDURE — 6370000000 HC RX 637 (ALT 250 FOR IP): Performed by: INTERNAL MEDICINE

## 2024-06-22 RX ADMIN — CETIRIZINE HYDROCHLORIDE 5 MG: 10 TABLET, FILM COATED ORAL at 09:26

## 2024-06-22 RX ADMIN — HEPARIN SODIUM 5000 UNITS: 5000 INJECTION INTRAVENOUS; SUBCUTANEOUS at 09:26

## 2024-06-22 RX ADMIN — HEPARIN SODIUM 5000 UNITS: 5000 INJECTION INTRAVENOUS; SUBCUTANEOUS at 21:41

## 2024-06-22 RX ADMIN — PANTOPRAZOLE SODIUM 40 MG: 40 INJECTION, POWDER, FOR SOLUTION INTRAVENOUS at 21:42

## 2024-06-22 RX ADMIN — SODIUM CHLORIDE, PRESERVATIVE FREE 10 ML: 5 INJECTION INTRAVENOUS at 09:27

## 2024-06-22 RX ADMIN — SODIUM CHLORIDE, PRESERVATIVE FREE 10 ML: 5 INJECTION INTRAVENOUS at 21:52

## 2024-06-22 RX ADMIN — KETOTIFEN FUMARATE 1 DROP: 0.25 SOLUTION/ DROPS OPHTHALMIC at 09:26

## 2024-06-22 RX ADMIN — AMLODIPINE BESYLATE 10 MG: 10 TABLET ORAL at 09:25

## 2024-06-22 RX ADMIN — SODIUM BICARBONATE 650 MG TABLET 1300 MG: at 09:25

## 2024-06-22 RX ADMIN — SODIUM BICARBONATE 650 MG TABLET 1300 MG: at 21:41

## 2024-06-22 RX ADMIN — PROCHLORPERAZINE EDISYLATE 10 MG: 5 INJECTION INTRAMUSCULAR; INTRAVENOUS at 21:45

## 2024-06-22 RX ADMIN — FLUTICASONE PROPIONATE 1 SPRAY: 50 SPRAY, METERED NASAL at 09:26

## 2024-06-22 RX ADMIN — PANTOPRAZOLE SODIUM 40 MG: 40 INJECTION, POWDER, FOR SOLUTION INTRAVENOUS at 09:26

## 2024-06-22 ASSESSMENT — PAIN DESCRIPTION - ORIENTATION: ORIENTATION: POSTERIOR

## 2024-06-22 ASSESSMENT — PAIN DESCRIPTION - FREQUENCY: FREQUENCY: CONTINUOUS

## 2024-06-22 ASSESSMENT — PAIN SCALES - GENERAL
PAINLEVEL_OUTOF10: 3
PAINLEVEL_OUTOF10: 3

## 2024-06-22 ASSESSMENT — PAIN DESCRIPTION - ONSET: ONSET: ON-GOING

## 2024-06-22 ASSESSMENT — PAIN DESCRIPTION - LOCATION: LOCATION: BACK

## 2024-06-22 ASSESSMENT — PAIN DESCRIPTION - PAIN TYPE: TYPE: CHRONIC PAIN

## 2024-06-22 ASSESSMENT — PAIN DESCRIPTION - DESCRIPTORS: DESCRIPTORS: ACHING

## 2024-06-22 NOTE — PROGRESS NOTES
Triny Ruvalcaba  Admission Date: 6/6/2024         [facility] Progress Note: 6/22/2024    Follow-up for: CKD V    The patient's chart is reviewed and the patient is discussed with the staff.    Subjective:   Pt seen and examined on HD-#3, 300 Qb, UF 2000 tolerating dialysis. Right CVC functioning well.     ROS:  Gen - no fever, no chills  CV - no chest pain  Lung - no shortness of breath, no cough  Abd -  tenderness- better, no nausea/vomiting  Ext - + edema    Current Facility-Administered Medications   Medication Dose Route Frequency    heparin (porcine) injection 5,000 Units  5,000 Units IntraCATHeter PRN    polyethylene glycol (GLYCOLAX) packet 17 g  17 g Oral Daily PRN    lidocaine 2 % injection    PRN    diatrizoate meglumine-sodium (GASTROGRAFIN) 66-10 % solution 15 mL  15 mL Oral ONCE PRN    fat emulsion (INTRALIPID/NUTRILIPID) 20 % infusion 250 mL  250 mL IntraVENous Q MWF    pantoprazole (PROTONIX) 40 mg in sodium chloride (PF) 0.9 % 10 mL injection  40 mg IntraVENous Q12H    bisacodyl (DULCOLAX) suppository 10 mg  10 mg Rectal Daily    hydrALAZINE (APRESOLINE) injection 10 mg  10 mg IntraVENous Q6H PRN    sodium bicarbonate tablet 1,300 mg  1,300 mg Oral BID    bisacodyl (DULCOLAX) suppository 10 mg  10 mg Rectal Daily PRN    amLODIPine (NORVASC) tablet 10 mg  10 mg Oral Daily    fluticasone (FLONASE) 50 MCG/ACT nasal spray 1 spray  1 spray Each Nostril Daily    cetirizine (ZYRTEC) tablet 5 mg  5 mg Oral Daily    ketotifen fumarate (ZADITOR) 0.035 % ophthalmic solution 1 drop  1 drop Both Eyes BID    sodium chloride flush 0.9 % injection 5-40 mL  5-40 mL IntraVENous 2 times per day    sodium chloride flush 0.9 % injection 5-40 mL  5-40 mL IntraVENous PRN    0.9 % sodium chloride infusion   IntraVENous PRN    potassium chloride 10 mEq/100 mL IVPB (Peripheral Line)  10 mEq IntraVENous PRN    magnesium sulfate 1000 mg in dextrose 5% 100 mL IVPB  1,000 mg IntraVENous PRN

## 2024-06-22 NOTE — PROGRESS NOTES
Admit Date: 2024    POD 16 Days Post-Op    Procedure:  Procedure(s):  OPEN VENTRAL HERNIA REPAIR W/ MESH    Postoperative ileus POD2-11  TPN since 24  Dialysis since 24  Subjective:     Pt awake and sitting in chair. No current complaints or acute events overnight. Pt is  tolerating regular consistency low sodium, low potassium, low phosphorus diet with no nausea or vomiting.  +flatus/+BM. Oliguria (dialysis /)    Objective:       Vitals:    24 0508 24 0737 24 0925 24 1117   BP:  104/67 122/74 109/64   Pulse:  93  98   Resp:  19  20   Temp:  98.4 °F (36.9 °C)  98.6 °F (37 °C)   TempSrc:  Oral  Oral   SpO2:  94%  96%   Weight: 111.5 kg (245 lb 14.4 oz)      Height:           Temp (24hrs), Av.9 °F (37.2 °C), Min:98.4 °F (36.9 °C), Max:99.5 °F (37.5 °C)  .  I&O reviewed as documented.    Physical Exam  Constitutional:       General: She is not in acute distress.  HENT:      Mouth/Throat:      Mouth: Mucous membranes are moist.   Cardiovascular:      Rate and Rhythm: Normal rate and regular rhythm.      Pulses: Normal pulses.      Heart sounds: Normal heart sounds. No murmur heard.     No friction rub. No gallop.   Pulmonary:      Effort: Pulmonary effort is normal. No respiratory distress.      Breath sounds: Normal breath sounds.   Abdominal:      General: Bowel sounds are normal. There is no distension.      Palpations: Abdomen is soft.   Genitourinary:     Comments: Dialysis T//-oliguria  Musculoskeletal:         General: Swelling present. Normal range of motion.      Cervical back: No rigidity.      Comments: Trace edema BLE BUE   Skin:     General: Skin is warm and dry.      Capillary Refill: Capillary refill takes less than 2 seconds.      Comments: Midline abdominal incision with no signs of infection   Neurological:      Mental Status: She is alert and oriented to person, place, and time.   Psychiatric:         Behavior: Behavior normal.          Labs:   Recent

## 2024-06-22 NOTE — PROGRESS NOTES
END OF SHIFT NOTE:    INTAKE/OUTPUT  06/21 0701 - 06/22 0700  In: 1444.9 [P.O.:941; I.V.:3.9]  Out: 2200 [Urine:25]  Voiding: Yes, 1 occurrence  Catheter: No  Drain:              Flatus: Patient does have flatus present.    Stool:  occurrences.    Characteristics:  Stool Appearance:  (No stool to assess this AM)  Stool Color: Green  Stool Amount: Medium  Stool Assessment  Incontinence: Yes  Stool Appearance:  (No stool to assess this AM)  Stool Color: Green  Stool Amount: Medium  Stool Source: Rectum  Last BM (including prior to admit): 06/22/24    Emesis:  occurrences.    Characteristics:   Emesis Appearance: Yellow    VITAL SIGNS  Patient Vitals for the past 12 hrs:   Temp Pulse Resp BP SpO2   06/22/24 1543 98.6 °F (37 °C) 100 20 122/81 96 %   06/22/24 1117 98.6 °F (37 °C) 98 20 109/64 96 %   06/22/24 0925 -- -- -- 122/74 --   06/22/24 0737 98.4 °F (36.9 °C) 93 19 104/67 94 %       Pain Assessment  Pain Level: 3 (06/22/24 0955)  Pain Location: Back  Patient's Stated Pain Goal: 5    Ambulating  Yes    Shift report given to oncoming nurse at the bedside.    Yvonne Conway RN

## 2024-06-23 LAB
ANION GAP SERPL CALC-SCNC: 18 MMOL/L (ref 9–18)
BUN SERPL-MCNC: 63 MG/DL (ref 8–23)
CALCIUM SERPL-MCNC: 9.1 MG/DL (ref 8.8–10.2)
CHLORIDE SERPL-SCNC: 96 MMOL/L (ref 98–107)
CO2 SERPL-SCNC: 24 MMOL/L (ref 20–28)
CREAT SERPL-MCNC: 8.67 MG/DL (ref 0.6–1.1)
GLUCOSE SERPL-MCNC: 117 MG/DL (ref 70–99)
POTASSIUM SERPL-SCNC: 4 MMOL/L (ref 3.5–5.1)
SODIUM SERPL-SCNC: 138 MMOL/L (ref 136–145)

## 2024-06-23 PROCEDURE — 80048 BASIC METABOLIC PNL TOTAL CA: CPT

## 2024-06-23 PROCEDURE — 2580000003 HC RX 258: Performed by: SURGERY

## 2024-06-23 PROCEDURE — 6360000002 HC RX W HCPCS: Performed by: NURSE PRACTITIONER

## 2024-06-23 PROCEDURE — 36415 COLL VENOUS BLD VENIPUNCTURE: CPT

## 2024-06-23 PROCEDURE — 1100000000 HC RM PRIVATE

## 2024-06-23 PROCEDURE — 6370000000 HC RX 637 (ALT 250 FOR IP): Performed by: INTERNAL MEDICINE

## 2024-06-23 PROCEDURE — 6360000002 HC RX W HCPCS: Performed by: SURGERY

## 2024-06-23 PROCEDURE — 6370000000 HC RX 637 (ALT 250 FOR IP): Performed by: SURGERY

## 2024-06-23 PROCEDURE — 2580000003 HC RX 258: Performed by: NURSE PRACTITIONER

## 2024-06-23 PROCEDURE — C9113 INJ PANTOPRAZOLE SODIUM, VIA: HCPCS | Performed by: NURSE PRACTITIONER

## 2024-06-23 RX ADMIN — PANTOPRAZOLE SODIUM 40 MG: 40 INJECTION, POWDER, FOR SOLUTION INTRAVENOUS at 12:14

## 2024-06-23 RX ADMIN — HEPARIN SODIUM 5000 UNITS: 5000 INJECTION INTRAVENOUS; SUBCUTANEOUS at 20:25

## 2024-06-23 RX ADMIN — CETIRIZINE HYDROCHLORIDE 5 MG: 10 TABLET, FILM COATED ORAL at 08:04

## 2024-06-23 RX ADMIN — KETOTIFEN FUMARATE 1 DROP: 0.25 SOLUTION/ DROPS OPHTHALMIC at 08:05

## 2024-06-23 RX ADMIN — SODIUM BICARBONATE 650 MG TABLET 1300 MG: at 08:04

## 2024-06-23 RX ADMIN — FLUTICASONE PROPIONATE 1 SPRAY: 50 SPRAY, METERED NASAL at 08:05

## 2024-06-23 RX ADMIN — PANTOPRAZOLE SODIUM 40 MG: 40 INJECTION, POWDER, FOR SOLUTION INTRAVENOUS at 23:38

## 2024-06-23 RX ADMIN — HEPARIN SODIUM 5000 UNITS: 5000 INJECTION INTRAVENOUS; SUBCUTANEOUS at 08:04

## 2024-06-23 RX ADMIN — BISACODYL 10 MG: 10 SUPPOSITORY RECTAL at 08:04

## 2024-06-23 RX ADMIN — SODIUM CHLORIDE, PRESERVATIVE FREE 10 ML: 5 INJECTION INTRAVENOUS at 20:28

## 2024-06-23 RX ADMIN — SODIUM BICARBONATE 650 MG TABLET 1300 MG: at 20:25

## 2024-06-23 RX ADMIN — AMLODIPINE BESYLATE 10 MG: 10 TABLET ORAL at 08:04

## 2024-06-23 RX ADMIN — SODIUM CHLORIDE, PRESERVATIVE FREE 10 ML: 5 INJECTION INTRAVENOUS at 08:09

## 2024-06-23 ASSESSMENT — PAIN SCALES - GENERAL
PAINLEVEL_OUTOF10: 3
PAINLEVEL_OUTOF10: 3

## 2024-06-23 ASSESSMENT — PAIN DESCRIPTION - ORIENTATION: ORIENTATION: POSTERIOR

## 2024-06-23 ASSESSMENT — PAIN DESCRIPTION - LOCATION: LOCATION: BACK

## 2024-06-23 ASSESSMENT — PAIN DESCRIPTION - DESCRIPTORS: DESCRIPTORS: ACHING

## 2024-06-23 ASSESSMENT — PAIN DESCRIPTION - PAIN TYPE: TYPE: CHRONIC PAIN

## 2024-06-23 ASSESSMENT — PAIN DESCRIPTION - ONSET: ONSET: ON-GOING

## 2024-06-23 ASSESSMENT — PAIN DESCRIPTION - FREQUENCY: FREQUENCY: CONTINUOUS

## 2024-06-23 NOTE — PROGRESS NOTES
END OF SHIFT NOTE:    INTAKE/OUTPUT  06/22 0701 - 06/23 0700  In: 680 [P.O.:680]  Out: 0   Voiding: No  Catheter: No  Drain:              Flatus: Patient does have flatus present.    Stool:  occurrences.    Characteristics:  Stool Appearance:  (No stool to assess this AM)  Stool Color: Green  Stool Amount: Medium  Stool Assessment  Incontinence: Yes  Stool Appearance:  (No stool to assess this AM)  Stool Color: Green  Stool Amount: Medium  Stool Source: Rectum  Last BM (including prior to admit): 06/22/24    Emesis:  occurrences.    Characteristics:   Emesis Appearance: Undigested food    VITAL SIGNS  Patient Vitals for the past 12 hrs:   Temp Pulse Resp BP SpO2   06/23/24 1516 98.1 °F (36.7 °C) 91 18 129/64 97 %   06/23/24 1049 98.4 °F (36.9 °C) 94 18 122/73 94 %   06/23/24 0734 98.4 °F (36.9 °C) 91 18 (!) 122/54 96 %       Pain Assessment  Pain Level: 3 (06/23/24 0834)  Pain Location: Back  Patient's Stated Pain Goal: 5    Ambulating  Yes to restroom    Shift report given to oncoming nurse at the bedside.    Yvonne Conway RN

## 2024-06-23 NOTE — PROGRESS NOTES
Reporting nurse informed writer pt experienced 2 episodes of emesis during the day. Upon assessing pt admits to abdominal \"full feeling\" x3 days. Reports emesis for the past 2 days. States fullness resolved after vomiting. Notes emesis after eating.   Writer called Kim Frommel NP with above pt concerns. Nurse inquired if reglan would help with symptoms. NP stated she will discuss with Dr. Crain.     Writer then sent message to NP \"Adding to our conversation: she also report \"my stomach feels tight\". Assessment=some distention; slight drainage from incision (new). +distent hypoactive BS. Reports \"feels like I need to upchuck not nausated\". Let me know thiughts.    Per Heide NP,  Symptoms are consistent with ileus. Continue to monitor for now. Thanks    Pt updated on plan of care. All needs currently met.

## 2024-06-23 NOTE — PROGRESS NOTES
Triny Ruvalcaba  Admission Date: 6/6/2024         [facility] Progress Note: 6/23/2024    Follow-up for: CKD V    The patient's chart is reviewed and the patient is discussed with the staff.    Subjective:        ROS:  Gen - no fever, no chills  CV - no chest pain  Lung - no shortness of breath, no cough  Abd -  tenderness- better, no nausea/vomiting  Ext - + edema    Current Facility-Administered Medications   Medication Dose Route Frequency    heparin (porcine) injection 5,000 Units  5,000 Units IntraCATHeter PRN    polyethylene glycol (GLYCOLAX) packet 17 g  17 g Oral Daily PRN    lidocaine 2 % injection    PRN    diatrizoate meglumine-sodium (GASTROGRAFIN) 66-10 % solution 15 mL  15 mL Oral ONCE PRN    fat emulsion (INTRALIPID/NUTRILIPID) 20 % infusion 250 mL  250 mL IntraVENous Q MWF    pantoprazole (PROTONIX) 40 mg in sodium chloride (PF) 0.9 % 10 mL injection  40 mg IntraVENous Q12H    bisacodyl (DULCOLAX) suppository 10 mg  10 mg Rectal Daily    hydrALAZINE (APRESOLINE) injection 10 mg  10 mg IntraVENous Q6H PRN    sodium bicarbonate tablet 1,300 mg  1,300 mg Oral BID    bisacodyl (DULCOLAX) suppository 10 mg  10 mg Rectal Daily PRN    amLODIPine (NORVASC) tablet 10 mg  10 mg Oral Daily    fluticasone (FLONASE) 50 MCG/ACT nasal spray 1 spray  1 spray Each Nostril Daily    cetirizine (ZYRTEC) tablet 5 mg  5 mg Oral Daily    ketotifen fumarate (ZADITOR) 0.035 % ophthalmic solution 1 drop  1 drop Both Eyes BID    sodium chloride flush 0.9 % injection 5-40 mL  5-40 mL IntraVENous 2 times per day    sodium chloride flush 0.9 % injection 5-40 mL  5-40 mL IntraVENous PRN    0.9 % sodium chloride infusion   IntraVENous PRN    potassium chloride 10 mEq/100 mL IVPB (Peripheral Line)  10 mEq IntraVENous PRN    magnesium sulfate 1000 mg in dextrose 5% 100 mL IVPB  1,000 mg IntraVENous PRN    prochlorperazine (COMPAZINE) tablet 10 mg  10 mg Oral Q6H PRN    Or    prochlorperazine (COMPAZINE)

## 2024-06-23 NOTE — PROGRESS NOTES
END OF SHIFT NOTE:    INTAKE/OUTPUT  06/22 0701 - 06/23 0700  In: 680 [P.O.:680]  Out: 0 -Oliguria  Voiding: Yes  Catheter: No  Drain:              Flatus: Patient does have flatus present.    Stool:  occurrences.    Characteristics:  Stool Appearance:  (No stool to assess this AM)  Stool Color: Green  Stool Amount: Medium  Stool Assessment  Incontinence: Yes  Stool Appearance:  (No stool to assess this AM)  Stool Color: Green  Stool Amount: Medium  Stool Source: Rectum  Last BM (including prior to admit): 06/22/24    Emesis: 1 occurrences.    Characteristics:   Emesis Appearance: Undigested food    VITAL SIGNS  Patient Vitals for the past 12 hrs:   Temp Pulse Resp BP SpO2   06/23/24 0238 97.9 °F (36.6 °C) 98 19 114/61 94 %   06/22/24 2246 97.3 °F (36.3 °C) 99 18 114/67 93 %   06/22/24 1903 99 °F (37.2 °C) 95 17 124/71 98 %       Pain Assessment  Pain Level: 3 (06/22/24 0955)  Pain Location: Back  Patient's Stated Pain Goal: 5    Ambulating  Yes    Shift report given to oncoming nurse at the bedside.    Jenna Bowling RN

## 2024-06-23 NOTE — PROGRESS NOTES
Admit Date: 2024    POD 17 Days Post-Op    Procedure:  Procedure(s):  OPEN VENTRAL HERNIA REPAIR W/ MESH    Postoperative ileus POD2-11  TPN since 24  Dialysis since 24  Subjective:     Pt awake in bed. No current complaints. Per RN, pt experienced some nausea and vomiting yesterday and overnight. Pt denies nausea/ vomiting today. Reports tolerating regular consistency low sodium, low potassium, low phosphorus diet with no nausea or vomiting.  +flatus/+BM. Oliguria (dialysis //)    Objective:       Vitals:    24 0238 24 0734 24 1049 24 1516   BP: 114/61 (!) 122/54 122/73 129/64   Pulse: 98 91 94 91   Resp: 19 18 18 18   Temp: 97.9 °F (36.6 °C) 98.4 °F (36.9 °C) 98.4 °F (36.9 °C) 98.1 °F (36.7 °C)   TempSrc: Oral Oral Oral Oral   SpO2: 94% 96% 94% 97%   Weight:       Height:           Temp (24hrs), Av.2 °F (36.8 °C), Min:97.3 °F (36.3 °C), Max:99 °F (37.2 °C)  .  I&O reviewed as documented.    Physical Exam  Constitutional:       General: She is not in acute distress.  HENT:      Mouth/Throat:      Mouth: Mucous membranes are moist.   Cardiovascular:      Rate and Rhythm: Normal rate and regular rhythm.      Pulses: Normal pulses.      Heart sounds: Normal heart sounds. No murmur heard.     No friction rub. No gallop.   Pulmonary:      Effort: Pulmonary effort is normal. No respiratory distress.      Breath sounds: Normal breath sounds.   Abdominal:      General: Bowel sounds are normal. There is no distension.      Palpations: Abdomen is soft.   Genitourinary:     Comments: Dialysis T//-oliguria  Musculoskeletal:         General: Normal range of motion.      Cervical back: No rigidity.   Skin:     General: Skin is warm and dry.      Capillary Refill: Capillary refill takes less than 2 seconds.      Comments: Midline abdominal incision with no signs of infection   Neurological:      Mental Status: She is alert and oriented to person, place, and time.   Psychiatric:

## 2024-06-24 LAB
ANION GAP SERPL CALC-SCNC: 20 MMOL/L (ref 9–18)
BUN SERPL-MCNC: 82 MG/DL (ref 8–23)
CALCIUM SERPL-MCNC: 9.1 MG/DL (ref 8.8–10.2)
CHLORIDE SERPL-SCNC: 95 MMOL/L (ref 98–107)
CO2 SERPL-SCNC: 21 MMOL/L (ref 20–28)
CREAT SERPL-MCNC: 11 MG/DL (ref 0.6–1.1)
GLUCOSE SERPL-MCNC: 125 MG/DL (ref 70–99)
MAGNESIUM SERPL-MCNC: 1.9 MG/DL (ref 1.8–2.4)
PHOSPHATE SERPL-MCNC: 7.3 MG/DL (ref 2.5–4.5)
POTASSIUM SERPL-SCNC: 3.9 MMOL/L (ref 3.5–5.1)
SODIUM SERPL-SCNC: 136 MMOL/L (ref 136–145)

## 2024-06-24 PROCEDURE — 6370000000 HC RX 637 (ALT 250 FOR IP): Performed by: NURSE PRACTITIONER

## 2024-06-24 PROCEDURE — 6370000000 HC RX 637 (ALT 250 FOR IP): Performed by: SURGERY

## 2024-06-24 PROCEDURE — 2500000003 HC RX 250 WO HCPCS: Performed by: SURGERY

## 2024-06-24 PROCEDURE — 90935 HEMODIALYSIS ONE EVALUATION: CPT

## 2024-06-24 PROCEDURE — 2580000003 HC RX 258: Performed by: SURGERY

## 2024-06-24 PROCEDURE — C9113 INJ PANTOPRAZOLE SODIUM, VIA: HCPCS | Performed by: NURSE PRACTITIONER

## 2024-06-24 PROCEDURE — 1100000000 HC RM PRIVATE

## 2024-06-24 PROCEDURE — 2580000003 HC RX 258: Performed by: NURSE PRACTITIONER

## 2024-06-24 PROCEDURE — 6370000000 HC RX 637 (ALT 250 FOR IP)

## 2024-06-24 PROCEDURE — 99221 1ST HOSP IP/OBS SF/LOW 40: CPT | Performed by: STUDENT IN AN ORGANIZED HEALTH CARE EDUCATION/TRAINING PROGRAM

## 2024-06-24 PROCEDURE — 6360000002 HC RX W HCPCS: Performed by: NURSE PRACTITIONER

## 2024-06-24 PROCEDURE — 36415 COLL VENOUS BLD VENIPUNCTURE: CPT

## 2024-06-24 PROCEDURE — 83735 ASSAY OF MAGNESIUM: CPT

## 2024-06-24 PROCEDURE — 84100 ASSAY OF PHOSPHORUS: CPT

## 2024-06-24 PROCEDURE — 6370000000 HC RX 637 (ALT 250 FOR IP): Performed by: INTERNAL MEDICINE

## 2024-06-24 PROCEDURE — 6360000002 HC RX W HCPCS: Performed by: SURGERY

## 2024-06-24 PROCEDURE — 80048 BASIC METABOLIC PNL TOTAL CA: CPT

## 2024-06-24 RX ORDER — SEVELAMER CARBONATE 800 MG/1
800 TABLET, FILM COATED ORAL
Status: DISCONTINUED | OUTPATIENT
Start: 2024-06-24 | End: 2024-06-26 | Stop reason: HOSPADM

## 2024-06-24 RX ORDER — ALLOPURINOL 100 MG/1
100 TABLET ORAL DAILY
Status: DISCONTINUED | OUTPATIENT
Start: 2024-06-24 | End: 2024-06-26 | Stop reason: HOSPADM

## 2024-06-24 RX ADMIN — SODIUM CHLORIDE, PRESERVATIVE FREE 10 ML: 5 INJECTION INTRAVENOUS at 20:59

## 2024-06-24 RX ADMIN — PANTOPRAZOLE SODIUM 40 MG: 40 INJECTION, POWDER, FOR SOLUTION INTRAVENOUS at 08:37

## 2024-06-24 RX ADMIN — SEVELAMER CARBONATE 800 MG: 800 TABLET, FILM COATED ORAL at 14:32

## 2024-06-24 RX ADMIN — HEPARIN SODIUM 5000 UNITS: 5000 INJECTION INTRAVENOUS; SUBCUTANEOUS at 08:37

## 2024-06-24 RX ADMIN — SODIUM CHLORIDE, PRESERVATIVE FREE 10 ML: 5 INJECTION INTRAVENOUS at 08:43

## 2024-06-24 RX ADMIN — SODIUM BICARBONATE 650 MG TABLET 1300 MG: at 08:37

## 2024-06-24 RX ADMIN — FLUTICASONE PROPIONATE 1 SPRAY: 50 SPRAY, METERED NASAL at 08:38

## 2024-06-24 RX ADMIN — KETOTIFEN FUMARATE 1 DROP: 0.25 SOLUTION/ DROPS OPHTHALMIC at 08:38

## 2024-06-24 RX ADMIN — SEVELAMER CARBONATE 800 MG: 800 TABLET, FILM COATED ORAL at 17:24

## 2024-06-24 RX ADMIN — PANTOPRAZOLE SODIUM 40 MG: 40 INJECTION, POWDER, FOR SOLUTION INTRAVENOUS at 20:50

## 2024-06-24 RX ADMIN — CETIRIZINE HYDROCHLORIDE 5 MG: 10 TABLET, FILM COATED ORAL at 08:37

## 2024-06-24 RX ADMIN — AMLODIPINE BESYLATE 10 MG: 10 TABLET ORAL at 08:37

## 2024-06-24 RX ADMIN — HEPARIN SODIUM 5000 UNITS: 5000 INJECTION INTRAVENOUS; SUBCUTANEOUS at 20:50

## 2024-06-24 RX ADMIN — ALLOPURINOL 100 MG: 100 TABLET ORAL at 14:32

## 2024-06-24 RX ADMIN — I.V. FAT EMULSION 250 ML: 20 EMULSION INTRAVENOUS at 17:22

## 2024-06-24 ASSESSMENT — PAIN DESCRIPTION - ORIENTATION: ORIENTATION: POSTERIOR

## 2024-06-24 ASSESSMENT — PAIN DESCRIPTION - LOCATION: LOCATION: BACK

## 2024-06-24 ASSESSMENT — PAIN SCALES - GENERAL: PAINLEVEL_OUTOF10: 3

## 2024-06-24 ASSESSMENT — PAIN DESCRIPTION - DESCRIPTORS: DESCRIPTORS: ACHING

## 2024-06-24 ASSESSMENT — PAIN DESCRIPTION - FREQUENCY: FREQUENCY: CONTINUOUS

## 2024-06-24 ASSESSMENT — PAIN DESCRIPTION - PAIN TYPE: TYPE: CHRONIC PAIN

## 2024-06-24 ASSESSMENT — PAIN DESCRIPTION - ONSET: ONSET: ON-GOING

## 2024-06-24 NOTE — DIALYSIS
TRANSFER IN - DIALYSIS    Received patient in dialysis unit  from [facility] [place] (unit) for ordered procedure.    Consent verified for renal replacement therapy. Procedure explained to patient, opportunity for Q&A provided. Call light given.     Patient a/ox3 and vital signs stable.  /64,  P86  , 0L O2 via RA.    Hemodialysis initiated using R CVC.  Aspirated and flushed both ports without difficulty. Dressing clean, dry and intact.  Machine settings per MD order.    Heparin 0 unit bolus and 0 units/hr.      Will monitor during treatment.

## 2024-06-24 NOTE — CONSULTS
without evidence of  diverticulitis.  LYMPH NODES: No significant retroperitoneal, mesenteric, or pelvic adenopathy.  - BONES: No fracture or significant bone lesion. Spine shows osteoarthritic  changes. L4 vertebra show 7 mm anterior displacement as compared to L5.  Bilateral hip prosthesis.  - VASCULATURE: Limited evaluation due to lack of intravenous contrast.  - OTHER: No ascites.Postsurgical changes are seen in the anterior abdomen and  anterior abdominal wall.  -    Impression  No bowel obstruction is evident. The appearance suggest small bowel  ileus, likely related to recent surgery.  Colonic diverticulosis without evidence of diverticulitis.  Postop changes are seen.        Electronically signed by Valeriano Peterson       Xray Result (most recent):  XR ABDOMEN (KUB) (SINGLE AP VIEW) 06/12/2024    Narrative  KUB    INDICATION:   NG tube placement    COMPARISON:  None    TECHNIQUE: Supine views of the abdomen were obtained.    FINDINGS: There is an NG tube extending to the body of the stomach. There are  multiple distended small bowel loops measuring up to 4 cm with midline skin  staples suggesting postoperative ileus. There is mild bibasal atelectasis as  well as additional small right effusion.    Impression  NG tube within the stomach.    Postoperative ileus.    Electronically signed by Del Blake       MRI Result (most recent):  No results found for this or any previous visit from the past 3650 days.           Condition on Admission: Good/Fair        Assessment and Plan:         //Gait and Self-care deficits secondary to debility  -Status post elective ventral hernia repair  //Decreased independence with ADLs  -Secondary to prolonged hospital stay  //Gait instability  -Patient currently does not meet diagnostic admission criteria for [facility] level setting (admitting diagnosis would be debility).  -Patient currently functioning at too high of level to meet admission criteria: Only requiring contact-guard  assistance for completion of ADLs.  Mobility has been limited by symptoms of nausea and vomiting status post abdominal surgery.  Recommend ongoing evaluation by physical therapist to determine if patient is able to ambulate household distances.  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  She will require ongoing assistance in caring for him at the time of discharge.  -Thank you for this consult. PM&R will continue to monitor functional improvements during hospital stay.          Code status: Full Code     Virginia Waters D.O. PM&R  Inpatient Rehabilitation Medical Director    June 24, 2024

## 2024-06-24 NOTE — PROGRESS NOTES
Triny Ruvalcaba  Admission Date: 6/6/2024         [facility] Progress Note: 6/24/2024    Follow-up for: CKD V    The patient's chart is reviewed and the patient is discussed with the staff.    Subjective:    Pt seen and examined on HD, right non tunnel catheter 350 Qb, UF 1500 tolerating dialysis, denies pain.     ROS:  Gen - no fever, no chills  CV - no chest pain  Lung - no shortness of breath, no cough  Abd -  tenderness- better, no nausea/vomiting  Ext -  edema- better    Current Facility-Administered Medications   Medication Dose Route Frequency    allopurinol (ZYLOPRIM) tablet 100 mg  100 mg Oral Daily    heparin (porcine) injection 5,000 Units  5,000 Units IntraCATHeter PRN    polyethylene glycol (GLYCOLAX) packet 17 g  17 g Oral Daily PRN    lidocaine 2 % injection    PRN    diatrizoate meglumine-sodium (GASTROGRAFIN) 66-10 % solution 15 mL  15 mL Oral ONCE PRN    fat emulsion (INTRALIPID/NUTRILIPID) 20 % infusion 250 mL  250 mL IntraVENous Q MWF    pantoprazole (PROTONIX) 40 mg in sodium chloride (PF) 0.9 % 10 mL injection  40 mg IntraVENous Q12H    bisacodyl (DULCOLAX) suppository 10 mg  10 mg Rectal Daily    hydrALAZINE (APRESOLINE) injection 10 mg  10 mg IntraVENous Q6H PRN    sodium bicarbonate tablet 1,300 mg  1,300 mg Oral BID    bisacodyl (DULCOLAX) suppository 10 mg  10 mg Rectal Daily PRN    amLODIPine (NORVASC) tablet 10 mg  10 mg Oral Daily    fluticasone (FLONASE) 50 MCG/ACT nasal spray 1 spray  1 spray Each Nostril Daily    cetirizine (ZYRTEC) tablet 5 mg  5 mg Oral Daily    ketotifen fumarate (ZADITOR) 0.035 % ophthalmic solution 1 drop  1 drop Both Eyes BID    sodium chloride flush 0.9 % injection 5-40 mL  5-40 mL IntraVENous 2 times per day    sodium chloride flush 0.9 % injection 5-40 mL  5-40 mL IntraVENous PRN    0.9 % sodium chloride infusion   IntraVENous PRN    potassium chloride 10 mEq/100 mL IVPB (Peripheral Line)  10 mEq IntraVENous PRN    magnesium  sulfate 1000 mg in dextrose 5% 100 mL IVPB  1,000 mg IntraVENous PRN    prochlorperazine (COMPAZINE) tablet 10 mg  10 mg Oral Q6H PRN    Or    prochlorperazine (COMPAZINE) injection 10 mg  10 mg IntraVENous Q6H PRN    heparin (porcine) injection 5,000 Units  5,000 Units SubCUTAneous BID    simethicone (MYLICON) chewable tablet 80 mg  80 mg Oral Q6H PRN    HYDROmorphone (DILAUDID) injection 0.5 mg  0.5 mg IntraVENous Q3H PRN    HYDROmorphone (DILAUDID) injection 1 mg  1 mg IntraVENous Q3H PRN    oxyCODONE (ROXICODONE) immediate release tablet 5 mg  5 mg Oral Q4H PRN    oxyCODONE (ROXICODONE) immediate release tablet 10 mg  10 mg Oral Q4H PRN         Objective:     Vitals:    06/24/24 0710 06/24/24 1106 06/24/24 1122 06/24/24 1200   BP: (!) 121/59 116/61 122/64 115/65   Pulse: 88 85 86 82   Resp:  18     Temp:  97.5 °F (36.4 °C)     TempSrc:  Oral     SpO2:  94%     Weight:       Height:         Intake and Output:   06/22 1901 - 06/24 0700  In: 980 [P.O.:980]  Out: 0   06/24 0701 - 06/24 1900  In: 210 [P.O.:210]  Out: 0     Physical Exam:   Constitutional:  the patient is well developed and in no acute distress, alert and following commands  HEENT:  Sclera clear, pupils equal, oral mucosa moist  Lungs: clear bilaterally   Cardiovascular:  Regular rate, S1, S2, no Rub   Abd/GI: soft and non-tender; with positive bowel sounds.  Ext: warm without cyanosis. There is trace lower leg edema.  Skin:  no jaundice or rashes  Neuro: no gross neuro deficits   Psychiatric: Calm.   Access: Right non tunnel catheter intact, accessed       LAB  No results for input(s): \"WBC\", \"HGB\", \"HCT\", \"PLT\", \"INR\" in the last 72 hours.    Recent Labs     06/22/24  0501 06/23/24  0356 06/24/24  0601    138 136   K 3.5 4.0 3.9   CL 97* 96* 95*   CO2 24 24 21   BUN 41* 63* 82*   CREATININE 6.04* 8.67* 11.00*   MG  --   --  1.9   PHOS  --   --  7.3*       No results for input(s): \"PH\", \"PCO2\", \"PO2\", \"HCO3\" in the last 72

## 2024-06-24 NOTE — PROGRESS NOTES
Comprehensive Nutrition Assessment    Type and Reason for Visit: Reassess  Malnutrition Screening Tool: Malnutrition Screen  Have you recently lost weight without trying?: 2 to 13 pounds (1 point)  Have you been eating poorly because of a decreased appetite?: Yes (1 point)  Malnutrition Screening Tool Score: 2  Consult for Parenteral Nutrition Management  (6/12 - Gen Surg)     Nutrition Recommendations/Plan:   Meals and Snacks:  Diet: Continue current order  Nutrition Supplement Therapy:  Medical food supplement therapy:  Discontinue Magic Cup twice per day (this provides 290 kcal and 9 grams protein per serving)     Malnutrition Assessment:  Malnutrition Status: At risk for malnutrition (Comment) (NPO/CLD due to altered GI function)    No lenard wasting    Nutrition Assessment:  Nutrition History: Visited with patient in room. Reports that she has been dealing with waxing and waning discomfort in her stomach over the past year. Typically eats 1 meal per day with snacks. Will drink Ensure and Boost at home to supplement her intake; but reports it \"runs right through her\". Patient does not endorse any changes in appetite/intake pta.      Do You Have Any Cultural, Episcopal, or Ethnic Food Preferences?: No   Weight History: 5/24/24: 254# (IM OV), 11/20/23: 253# (IM OV), 5/19/23: 265# (IM OV)  CBW: 259# (6/6 standing scale)   No significant weight changes.   Nutrition Background:       PMH: CKD5 not on dialysis, degenerative disk disease, fibromyalgia, SBO, obesity, IBS, HTN, HLD. Admitted with ventral hernia with gangrene. Underwent open ventral hernia repair 6/6. Additional findings throughout admission of hyperkalemia.  6/9: KUB impression: Multiple dilated loops of small bowel. Consistent with ileus versus partial small bowel obstruction recommend follow-up.  6/12 KUB impression: Postoperative ileus.  6/13: SBFT impression: No evidence of a complete small bowel obstruction.  Dilatation of multiple proximal small  bowel loops, with delayed transit time to the colon.  This probably represents a postoperative ileus, although a partial small bowel obstruction is not excluded.  6/17 CT impression: The appearance suggest small bowel ileus, likely related to recent surgery.  6/19: Temporary HD catheter placed and HD initiated.  Nutrition Interval:  Access: PIV placed 6/15 (R forearm 22 g), PIV placed 6/15 (left basilic 20 g)  6/9: Hyperkalemia resolved, episode of emesis overnight. Pt made NPO, NG placed to LIS 6/11: NG removed, patient with large episode of emesis overnight (~900 cc)    6/12: NG replaced and set to LIS. Dilute PPN initiated with lipids 3x per week initiated.  6/13: PN with slight increase in volume due to NG OP/emesis. Trial daily lipids.   6/14: Move back to lipids 3x per week. NG clamped. Pt advanced to clears per GenSurgery. Episode of emesis overnight.   6/16: NG removed in PM after tolerating some clears. PN weaned to 1L with diet adv. Later, pt with episode of emesis overnight.   6/20: Diet advanced and TPN d/c by surgery.    Patient seen with visitors. She states she had a good day and ate well yesterday. Her visitor agrees stating that has been the best she has eaten. She had HD today and states she is not feeling good. She is not eating the Magic Cup as she does not like them. Discussed that this pattern is typical after starting HD but will improve over time. Visitor with questions regarding diet. Explained that she will have an RD that will monitor labs and provide individualized nutrition recommendations. Explained labs that are typically tracked.     Current Intake:   Average Meal Intake: 26-50% Average Supplements Intake: Refusing to take      Anthropometric Measures:  Height: 165.1 cm (5' 5\")  Current Body Wt: 111.5 kg (245 lb 13 oz) (6/22), Weight source: Bed Scale  BMI: 40.9, Obese Class 3 (BMI 40.0 or greater)  Admission Body Weight: 117.3 kg (258 lb 9.6 oz) (6/6 - standing scale)  Ideal Body

## 2024-06-24 NOTE — DIALYSIS
TRANSFER OUT- DIALYSIS    Hemodialysis treatment completed. PT ran 2.5 hours, without complications.    Patient alert and VS stable  /87  P 90       1 Kg removed.      Flushed both ports with 10 mL of NS.  CVC dressing clean, dry, and intact, tego caps intact, curos caps placed.      Meds given: 0.    RBCs given during dialysis: 0    Patient to 221 after dialysis.

## 2024-06-24 NOTE — CARE COORDINATION
CM reviewed chart.     CM spoke to patient and patient's niece Alie at bedside to inform them patient has not been accepted to [facility] at this time. Patient would like to go home with [facility].

## 2024-06-24 NOTE — PROGRESS NOTES
Admit Date: 2024    POD 18 Days Post-Op    Procedure:  Procedure(s):  OPEN VENTRAL HERNIA REPAIR W/ MESH    Postoperative ileus POD2-11  TPN 24-24  Dialysis since 24    Subjective:     A/O x 4 with NAD.  Respirations even and unlabored on room air.  Tolerating dialysis.  Tolerating regular consistency low sodium, low potassium, low phosphorus diet and nutritional supplementation with no nausea or vomiting.  Positive flatus.  BM x 2.  Minimal activity with PT.  Afebrile.  VSS.  Abdominal pain well-controlled with multimodal analgesia 3/10 at worst 0/10 with  Patient complains of ankle joint pain when touched for physical assessment.   Objective:       Vitals:    24 1300 24 1330 24 1349 24 1430   BP: 120/64 118/65 124/87 126/63   Pulse: 91 90 83 93   Resp:    18   Temp:    98.6 °F (37 °C)   TempSrc:       SpO2:       Weight:       Height:           Temp (24hrs), Av.2 °F (36.8 °C), Min:97.5 °F (36.4 °C), Max:98.8 °F (37.1 °C)  .  I&O reviewed as documented.    Anuric-HD  Physical Exam  HENT:      Mouth/Throat:      Mouth: Mucous membranes are moist.   Cardiovascular:      Rate and Rhythm: Normal rate and regular rhythm.      Pulses: Normal pulses.      Heart sounds: Normal heart sounds. No murmur heard.     No friction rub. No gallop.   Pulmonary:      Effort: Pulmonary effort is normal. No respiratory distress.      Breath sounds: Normal breath sounds.   Abdominal:      General: Bowel sounds are normal. There is no distension.      Palpations: Abdomen is soft.   Genitourinary:     Comments: Anuric-HD  Musculoskeletal:         General: Swelling present. Normal range of motion.      Cervical back: No rigidity.      Comments: 1+ nonpitting edema BUE BLE   Skin:     General: Skin is warm and dry.      Capillary Refill: Capillary refill takes less than 2 seconds.   Neurological:      Mental Status: She is alert and oriented to person, place, and time.   Psychiatric:          Behavior: Behavior normal.          Labs:   Recent Results (from the past 24 hour(s))   Basic Metabolic Panel    Collection Time: 06/24/24  6:01 AM   Result Value Ref Range    Sodium 136 136 - 145 mmol/L    Potassium 3.9 3.5 - 5.1 mmol/L    Chloride 95 (L) 98 - 107 mmol/L    CO2 21 20 - 28 mmol/L    Anion Gap 20 (H) 9 - 18 mmol/L    Glucose 125 (H) 70 - 99 mg/dL    BUN 82 (H) 8 - 23 MG/DL    Creatinine 11.00 (H) 0.60 - 1.10 MG/DL    Est, Glom Filt Rate 3 (L) >60 ml/min/1.73m2    Calcium 9.1 8.8 - 10.2 MG/DL   Magnesium    Collection Time: 06/24/24  6:01 AM   Result Value Ref Range    Magnesium 1.9 1.8 - 2.4 mg/dL   Phosphorus    Collection Time: 06/24/24  6:01 AM   Result Value Ref Range    Phosphorus 7.3 (H) 2.5 - 4.5 MG/DL       Assessment:     Principal Problem:    Ventral hernia without obstruction or gangrene  Active Problems:    Morbid obesity (HCC)  Resolved Problems:    * No resolved hospital problems. *        Plan/Recommendations/Medical Decision Making:     PLAN 6/24/24    Tolerating regular consistency diet low K+, low sodium, low Phos with no nausea or vomiting.  Positive BM    Case management assisting with discharge disposition-plan for [facility] versus [facility].  Family would like [facility] but patient is not meeting [facility] criteria 4/2 not enough activity with physical therapy at present.  Patient with minimal activity with PT 6/21/2024  Outpatient dialysis spot been secured T/TH/S in [facility], RITU William - NP

## 2024-06-24 NOTE — PROGRESS NOTES
END OF SHIFT NOTE:    INTAKE/OUTPUT  06/23 0701 - 06/24 0700  In: 980 [P.O.:980]  Out: -   Voiding: No  Catheter: Yes  Drain:              Flatus: Patient does have flatus present.    Stool:  occurrences.    Characteristics:  Stool Appearance:  (No stool to assess this AM)  Stool Color: Green  Stool Amount: Medium  Stool Assessment  Incontinence: Yes  Stool Appearance:  (No stool to assess this AM)  Stool Color: Green  Stool Amount: Medium  Stool Source: Rectum  Last BM (including prior to admit): 06/23/24    Emesis:  occurrences.    Characteristics:   Emesis Appearance: Undigested food    VITAL SIGNS  Patient Vitals for the past 12 hrs:   Temp Pulse Resp BP SpO2   06/24/24 1916 98.8 °F (37.1 °C) (!) 105 18 (!) 117/93 95 %   06/24/24 1510 97.9 °F (36.6 °C) 94 18 (!) 104/54 96 %   06/24/24 1430 98.6 °F (37 °C) 93 18 126/63 --   06/24/24 1349 -- 83 -- 124/87 --   06/24/24 1330 -- 90 -- 118/65 --   06/24/24 1300 -- 91 -- 120/64 --   06/24/24 1230 -- 85 -- 121/63 --   06/24/24 1200 -- 82 -- 115/65 --   06/24/24 1122 -- 86 -- 122/64 --   06/24/24 1106 97.5 °F (36.4 °C) 85 18 116/61 94 %       Pain Assessment  Pain Level: 3 (06/24/24 0837)  Pain Location: Back  Patient's Stated Pain Goal: 3    Ambulating  Yes    Shift report given to oncoming nurse at the bedside.    Yvonne Conway RN

## 2024-06-24 NOTE — PROGRESS NOTES
Physical Therapy Note:    Attempted to see patient this AM for physical therapy re-evaluation session. Patient off the floor in HD. Will follow and re-attempt as schedule permits/patient available. Thank you,    A Annette Conway, PT     Rehab Caseload Tracker

## 2024-06-25 ENCOUNTER — APPOINTMENT (OUTPATIENT)
Dept: ULTRASOUND IMAGING | Age: 79
DRG: 353 | End: 2024-06-25
Attending: SURGERY
Payer: MEDICARE

## 2024-06-25 PROBLEM — N18.6 CKD (CHRONIC KIDNEY DISEASE) STAGE V REQUIRING CHRONIC DIALYSIS (HCC): Status: ACTIVE | Noted: 2024-06-25

## 2024-06-25 PROBLEM — Z99.2 CKD (CHRONIC KIDNEY DISEASE) STAGE V REQUIRING CHRONIC DIALYSIS (HCC): Status: ACTIVE | Noted: 2024-06-25

## 2024-06-25 PROBLEM — K43.9 VENTRAL HERNIA WITHOUT OBSTRUCTION OR GANGRENE: Status: RESOLVED | Noted: 2024-05-28 | Resolved: 2024-06-25

## 2024-06-25 LAB
ANION GAP SERPL CALC-SCNC: 17 MMOL/L (ref 9–18)
BUN SERPL-MCNC: 59 MG/DL (ref 8–23)
CALCIUM SERPL-MCNC: 8.7 MG/DL (ref 8.8–10.2)
CHLORIDE SERPL-SCNC: 98 MMOL/L (ref 98–107)
CO2 SERPL-SCNC: 23 MMOL/L (ref 20–28)
CREAT SERPL-MCNC: 8.08 MG/DL (ref 0.6–1.1)
ERYTHROCYTE [DISTWIDTH] IN BLOOD BY AUTOMATED COUNT: 14.6 % (ref 11.9–14.6)
GLUCOSE SERPL-MCNC: 120 MG/DL (ref 70–99)
HCT VFR BLD AUTO: 28.1 % (ref 35.8–46.3)
HGB BLD-MCNC: 8.8 G/DL (ref 11.7–15.4)
MCH RBC QN AUTO: 27.7 PG (ref 26.1–32.9)
MCHC RBC AUTO-ENTMCNC: 31.3 G/DL (ref 31.4–35)
MCV RBC AUTO: 88.4 FL (ref 82–102)
NRBC # BLD: 0 K/UL (ref 0–0.2)
PLATELET # BLD AUTO: 217 K/UL (ref 150–450)
PMV BLD AUTO: 11.7 FL (ref 9.4–12.3)
POTASSIUM SERPL-SCNC: 3.8 MMOL/L (ref 3.5–5.1)
RBC # BLD AUTO: 3.18 M/UL (ref 4.05–5.2)
SODIUM SERPL-SCNC: 137 MMOL/L (ref 136–145)
WBC # BLD AUTO: 14.9 K/UL (ref 4.3–11.1)

## 2024-06-25 PROCEDURE — 2580000003 HC RX 258: Performed by: SURGERY

## 2024-06-25 PROCEDURE — 6370000000 HC RX 637 (ALT 250 FOR IP): Performed by: NURSE PRACTITIONER

## 2024-06-25 PROCEDURE — 6370000000 HC RX 637 (ALT 250 FOR IP)

## 2024-06-25 PROCEDURE — 1100000000 HC RM PRIVATE

## 2024-06-25 PROCEDURE — 80048 BASIC METABOLIC PNL TOTAL CA: CPT

## 2024-06-25 PROCEDURE — 6360000002 HC RX W HCPCS: Performed by: NURSE PRACTITIONER

## 2024-06-25 PROCEDURE — 6360000002 HC RX W HCPCS: Performed by: SURGERY

## 2024-06-25 PROCEDURE — 93970 EXTREMITY STUDY: CPT | Performed by: RADIOLOGY

## 2024-06-25 PROCEDURE — 36415 COLL VENOUS BLD VENIPUNCTURE: CPT

## 2024-06-25 PROCEDURE — 93970 EXTREMITY STUDY: CPT

## 2024-06-25 PROCEDURE — 99221 1ST HOSP IP/OBS SF/LOW 40: CPT | Performed by: SURGERY

## 2024-06-25 PROCEDURE — 2580000003 HC RX 258: Performed by: NURSE PRACTITIONER

## 2024-06-25 PROCEDURE — 6370000000 HC RX 637 (ALT 250 FOR IP): Performed by: SURGERY

## 2024-06-25 PROCEDURE — C9113 INJ PANTOPRAZOLE SODIUM, VIA: HCPCS | Performed by: NURSE PRACTITIONER

## 2024-06-25 PROCEDURE — 85027 COMPLETE CBC AUTOMATED: CPT

## 2024-06-25 RX ORDER — ALLOPURINOL 100 MG/1
100 TABLET ORAL DAILY
Qty: 30 TABLET | Refills: 0 | Status: SHIPPED | OUTPATIENT
Start: 2024-06-25

## 2024-06-25 RX ORDER — CALCIUM ACETATE 667 MG/1
1 TABLET ORAL 3 TIMES DAILY
Qty: 180 TABLET | Refills: 1 | Status: SHIPPED | OUTPATIENT
Start: 2024-06-25

## 2024-06-25 RX ORDER — POLYETHYLENE GLYCOL 3350 17 G/17G
17 POWDER, FOR SOLUTION ORAL DAILY PRN
Qty: 90 DEVICE | Refills: 0 | Status: SHIPPED | OUTPATIENT
Start: 2024-06-25 | End: 2024-09-23

## 2024-06-25 RX ADMIN — SEVELAMER CARBONATE 800 MG: 800 TABLET, FILM COATED ORAL at 12:13

## 2024-06-25 RX ADMIN — SODIUM CHLORIDE, PRESERVATIVE FREE 10 ML: 5 INJECTION INTRAVENOUS at 09:31

## 2024-06-25 RX ADMIN — ALLOPURINOL 100 MG: 100 TABLET ORAL at 09:31

## 2024-06-25 RX ADMIN — PANTOPRAZOLE SODIUM 40 MG: 40 INJECTION, POWDER, FOR SOLUTION INTRAVENOUS at 09:54

## 2024-06-25 RX ADMIN — PANTOPRAZOLE SODIUM 40 MG: 40 INJECTION, POWDER, FOR SOLUTION INTRAVENOUS at 22:50

## 2024-06-25 RX ADMIN — KETOTIFEN FUMARATE 1 DROP: 0.25 SOLUTION/ DROPS OPHTHALMIC at 22:50

## 2024-06-25 RX ADMIN — CETIRIZINE HYDROCHLORIDE 5 MG: 10 TABLET, FILM COATED ORAL at 09:32

## 2024-06-25 RX ADMIN — FLUTICASONE PROPIONATE 1 SPRAY: 50 SPRAY, METERED NASAL at 09:31

## 2024-06-25 RX ADMIN — SODIUM CHLORIDE, PRESERVATIVE FREE 10 ML: 5 INJECTION INTRAVENOUS at 22:54

## 2024-06-25 RX ADMIN — SEVELAMER CARBONATE 800 MG: 800 TABLET, FILM COATED ORAL at 17:01

## 2024-06-25 RX ADMIN — KETOTIFEN FUMARATE 1 DROP: 0.25 SOLUTION/ DROPS OPHTHALMIC at 09:31

## 2024-06-25 RX ADMIN — SEVELAMER CARBONATE 800 MG: 800 TABLET, FILM COATED ORAL at 09:30

## 2024-06-25 RX ADMIN — HEPARIN SODIUM 5000 UNITS: 5000 INJECTION INTRAVENOUS; SUBCUTANEOUS at 09:30

## 2024-06-25 NOTE — PROGRESS NOTES
Triny Ruvalcaba  Admission Date: 6/6/2024         [facility] Progress Note: 6/25/2024    Follow-up for: CKD V    The patient's chart is reviewed and the patient is discussed with the staff.    Subjective:    Pt seen and examined in room  ROS:  Gen - no fever, no chills  CV - no chest pain  Lung - no shortness of breath, no cough  Abd -  tenderness- better, no nausea/vomiting  Ext -  edema- better    Current Facility-Administered Medications   Medication Dose Route Frequency    allopurinol (ZYLOPRIM) tablet 100 mg  100 mg Oral Daily    sevelamer (RENVELA) tablet 800 mg  800 mg Oral TID WC    heparin (porcine) injection 5,000 Units  5,000 Units IntraCATHeter PRN    polyethylene glycol (GLYCOLAX) packet 17 g  17 g Oral Daily PRN    lidocaine 2 % injection    PRN    diatrizoate meglumine-sodium (GASTROGRAFIN) 66-10 % solution 15 mL  15 mL Oral ONCE PRN    fat emulsion (INTRALIPID/NUTRILIPID) 20 % infusion 250 mL  250 mL IntraVENous Q MWF    pantoprazole (PROTONIX) 40 mg in sodium chloride (PF) 0.9 % 10 mL injection  40 mg IntraVENous Q12H    bisacodyl (DULCOLAX) suppository 10 mg  10 mg Rectal Daily    hydrALAZINE (APRESOLINE) injection 10 mg  10 mg IntraVENous Q6H PRN    bisacodyl (DULCOLAX) suppository 10 mg  10 mg Rectal Daily PRN    amLODIPine (NORVASC) tablet 10 mg  10 mg Oral Daily    fluticasone (FLONASE) 50 MCG/ACT nasal spray 1 spray  1 spray Each Nostril Daily    cetirizine (ZYRTEC) tablet 5 mg  5 mg Oral Daily    ketotifen fumarate (ZADITOR) 0.035 % ophthalmic solution 1 drop  1 drop Both Eyes BID    sodium chloride flush 0.9 % injection 5-40 mL  5-40 mL IntraVENous 2 times per day    sodium chloride flush 0.9 % injection 5-40 mL  5-40 mL IntraVENous PRN    0.9 % sodium chloride infusion   IntraVENous PRN    potassium chloride 10 mEq/100 mL IVPB (Peripheral Line)  10 mEq IntraVENous PRN    magnesium sulfate 1000 mg in dextrose 5% 100 mL IVPB  1,000 mg IntraVENous PRN

## 2024-06-25 NOTE — PROGRESS NOTES
Physical Therapy Note:    Attempted to see patient 2x  this PM for physical therapy treatment  session. Patient off the floor initially, then refused second attempt stating she just got cleaned up and is tired.. Will follow and re-attempt as schedule permits/patient available. Thank you,    A Annette Conway, PT     Rehab Caseload Tracker

## 2024-06-25 NOTE — CONSULTS
[address] [place] [id number]  [phone] FAX: [phone]    Triny Ruvalcaba  1945    No chief complaint on file.          HPI   Ms. Triny Ruvalcaba is a 78 y.o. year old female who right-hand-dominant needing long-term dialysis access.  She is currently getting dialysis through temporary dialysis access.    Current Facility-Administered Medications   Medication Dose Route Frequency Provider Last Rate Last Admin    allopurinol (ZYLOPRIM) tablet 100 mg  100 mg Oral Daily Stanley Tellez APRN - NP   100 mg at 06/24/24 1432    sevelamer (RENVELA) tablet 800 mg  800 mg Oral TID WC Ermias Green APRN - CNP   800 mg at 06/24/24 1724    heparin (porcine) injection 5,000 Units  5,000 Units IntraCATHeter PRN Ifeoma Martin APRN - NP   5,000 Units at 06/21/24 0719    polyethylene glycol (GLYCOLAX) packet 17 g  17 g Oral Daily PRN Heide Page APRN - CNP        lidocaine 2 % injection    PRN Del Rome MD   10 mL at 06/19/24 0813    diatrizoate meglumine-sodium (GASTROGRAFIN) 66-10 % solution 15 mL  15 mL Oral ONCE PRN Heide Page APRN - CNP   15 mL at 06/17/24 1050    fat emulsion (INTRALIPID/NUTRILIPID) 20 % infusion 250 mL  250 mL IntraVENous Q MWF Harrison Crain MD   Stopped at 06/25/24 0522    pantoprazole (PROTONIX) 40 mg in sodium chloride (PF) 0.9 % 10 mL injection  40 mg IntraVENous Q12H Frommel, Kimberly, APRN - CNP   40 mg at 06/24/24 2050    bisacodyl (DULCOLAX) suppository 10 mg  10 mg Rectal Daily Harrison Crain MD   10 mg at 06/23/24 0804    hydrALAZINE (APRESOLINE) injection 10 mg  10 mg IntraVENous Q6H PRN Stanley Tellez APRN - NP   10 mg at 06/17/24 0316    bisacodyl (DULCOLAX) suppository 10 mg  10 mg Rectal Daily PRN Frommel, Kimberly, APRN - CNP   10 mg at 06/15/24 1140    amLODIPine (NORVASC) tablet 10 mg  10 mg Oral Daily Harrison Crain MD   10 mg at 06/24/24 0837    fluticasone (FLONASE) 50 MCG/ACT nasal spray 1 spray  1

## 2024-06-25 NOTE — PROGRESS NOTES
END OF SHIFT NOTE:    INTAKE/OUTPUT  06/24 0701 - 06/25 0700  In: 1140 [P.O.:640]  Out: 1500   Voiding: Yes  Catheter: No  Drain:              Flatus: Patient does have flatus present.    Stool: 1 occurrences.    Characteristics:  Stool Appearance: Loose, Watery  Stool Color: Green  Stool Amount: Medium  Stool Assessment  Incontinence: Yes  Stool Appearance: Loose, Watery  Stool Color: Green  Stool Amount: Medium  Stool Source: Rectum  Last BM (including prior to admit): 06/24/24    Emesis:  occurrences.    Characteristics:   Emesis Appearance: Undigested food    VITAL SIGNS  Patient Vitals for the past 12 hrs:   Temp Pulse Resp BP SpO2   06/25/24 1535 97.7 °F (36.5 °C) 87 18 111/64 99 %   06/25/24 1145 98.1 °F (36.7 °C) 81 18 101/66 97 %   06/25/24 0900 -- 87 -- 95/65 --   06/25/24 0718 -- 85 -- (!) 98/54 95 %   06/25/24 0716 98.1 °F (36.7 °C) 89 18 (!) 97/48 93 %       Pain Assessment  Pain Level: 3 (06/24/24 0837)  Pain Location: Back  Patient's Stated Pain Goal: 3    Ambulating  Yes    Shift report given to oncoming nurse at the bedside.    Gary Weber RN

## 2024-06-25 NOTE — CARE COORDINATION
CM spoke to [facility] admissions, at [phone], who confirmed that patient can start outpatient HD at [place] location, on Thursday 6/27/24.     CM spoke to  general surgery, who confirmed patient will be discharged tomorrow after procedure.     CM spoke to patient and patient's niece, Alie Tellez, [phone], and informed them to set up transportation for patient's outpatient HD. CM gave patient and Alie [facility] [place] address and phone number.

## 2024-06-25 NOTE — PROGRESS NOTES
Admit Date: 2024    POD 19 Days Post-Op    Procedure:  Procedure(s):  OPEN VENTRAL HERNIA REPAIR W/ MESH    Postoperative ileus POD2-11  TPN 24-24  Dialysis since 24    Subjective:   Tolerating HD  Has Temp HD cath in place  Patient complains of ankle joint pain - on allopurinol  Objective:       Vitals:    24 2249 24 0300 24 0716 24 0718   BP: (!) 107/58 (!) 110/55 (!) 97/48 (!) 98/54   Pulse: 93 91 89 85   Resp: 18 18 18    Temp: 98.2 °F (36.8 °C) 98.6 °F (37 °C) 98.1 °F (36.7 °C)    TempSrc: Oral Oral Oral    SpO2: 94% 94% 93% 95%   Weight:       Height:           Temp (24hrs), Av.2 °F (36.8 °C), Min:97.5 °F (36.4 °C), Max:98.8 °F (37.1 °C)    Intake/Output Summary (Last 24 hours) at 2024 0838  Last data filed at 2024 1750  Gross per 24 hour   Intake 1140 ml   Output 1500 ml   Net -360 ml         Anuric-HD  Physical Exam  HENT:      Mouth/Throat:      Mouth: Mucous membranes are moist.   Cardiovascular:      Rate and Rhythm: Normal rate and regular rhythm.      Pulses: Normal pulses.      Heart sounds: Normal heart sounds. No murmur heard.     No friction rub. No gallop.   Pulmonary:      Effort: Pulmonary effort is normal. No respiratory distress.      Breath sounds: Normal breath sounds.   Abdominal:      General: Bowel sounds are normal. There is no distension.      Palpations: Abdomen is soft.   Genitourinary:     Comments: Anuric-HD  Musculoskeletal:         General: Swelling present. Normal range of motion.      Cervical back: No rigidity.      Comments: 1+ nonpitting edema BUE BLE   Skin:     General: Skin is warm and dry.      Capillary Refill: Capillary refill takes less than 2 seconds.   Neurological:      Mental Status: She is alert and oriented to person, place, and time.   Psychiatric:         Behavior: Behavior normal.          Labs:   Recent Results (from the past 24 hour(s))   Basic Metabolic Panel    Collection Time: 24  3:55 AM

## 2024-06-26 ENCOUNTER — APPOINTMENT (OUTPATIENT)
Dept: INTERVENTIONAL RADIOLOGY/VASCULAR | Age: 79
DRG: 353 | End: 2024-06-26
Attending: SURGERY
Payer: MEDICARE

## 2024-06-26 VITALS
HEIGHT: 65 IN | OXYGEN SATURATION: 99 % | HEART RATE: 81 BPM | WEIGHT: 249.1 LBS | DIASTOLIC BLOOD PRESSURE: 67 MMHG | RESPIRATION RATE: 18 BRPM | TEMPERATURE: 97.7 F | BODY MASS INDEX: 41.5 KG/M2 | SYSTOLIC BLOOD PRESSURE: 115 MMHG

## 2024-06-26 LAB
ANION GAP SERPL CALC-SCNC: 16 MMOL/L (ref 9–18)
BASOPHILS # BLD: 0.1 K/UL (ref 0–0.2)
BASOPHILS NFR BLD: 0 % (ref 0–2)
BUN SERPL-MCNC: 75 MG/DL (ref 8–23)
CALCIUM SERPL-MCNC: 8.8 MG/DL (ref 8.8–10.2)
CHLORIDE SERPL-SCNC: 97 MMOL/L (ref 98–107)
CO2 SERPL-SCNC: 23 MMOL/L (ref 20–28)
CREAT SERPL-MCNC: 10.6 MG/DL (ref 0.6–1.1)
DIFFERENTIAL METHOD BLD: ABNORMAL
EOSINOPHIL # BLD: 0.5 K/UL (ref 0–0.8)
EOSINOPHIL NFR BLD: 4 % (ref 0.5–7.8)
ERYTHROCYTE [DISTWIDTH] IN BLOOD BY AUTOMATED COUNT: 14.6 % (ref 11.9–14.6)
GLUCOSE SERPL-MCNC: 105 MG/DL (ref 70–99)
HCT VFR BLD AUTO: 26.4 % (ref 35.8–46.3)
HGB BLD-MCNC: 8 G/DL (ref 11.7–15.4)
IMM GRANULOCYTES # BLD AUTO: 0.2 K/UL (ref 0–0.5)
IMM GRANULOCYTES NFR BLD AUTO: 2 % (ref 0–5)
LYMPHOCYTES # BLD: 2.1 K/UL (ref 0.5–4.6)
LYMPHOCYTES NFR BLD: 16 % (ref 13–44)
MAGNESIUM SERPL-MCNC: 1.6 MG/DL (ref 1.8–2.4)
MCH RBC QN AUTO: 26.8 PG (ref 26.1–32.9)
MCHC RBC AUTO-ENTMCNC: 30.3 G/DL (ref 31.4–35)
MCV RBC AUTO: 88.6 FL (ref 82–102)
MONOCYTES # BLD: 0.6 K/UL (ref 0.1–1.3)
MONOCYTES NFR BLD: 5 % (ref 4–12)
NEUTS SEG # BLD: 9.5 K/UL (ref 1.7–8.2)
NEUTS SEG NFR BLD: 73 % (ref 43–78)
NRBC # BLD: 0 K/UL (ref 0–0.2)
PHOSPHATE SERPL-MCNC: 6.5 MG/DL (ref 2.5–4.5)
PLATELET # BLD AUTO: 197 K/UL (ref 150–450)
PMV BLD AUTO: 10.8 FL (ref 9.4–12.3)
POTASSIUM SERPL-SCNC: 4 MMOL/L (ref 3.5–5.1)
RBC # BLD AUTO: 2.98 M/UL (ref 4.05–5.2)
SODIUM SERPL-SCNC: 137 MMOL/L (ref 136–145)
WBC # BLD AUTO: 13 K/UL (ref 4.3–11.1)

## 2024-06-26 PROCEDURE — 84100 ASSAY OF PHOSPHORUS: CPT

## 2024-06-26 PROCEDURE — 36558 INSERT TUNNELED CV CATH: CPT | Performed by: RADIOLOGY

## 2024-06-26 PROCEDURE — 02PAX3Z REMOVAL OF INFUSION DEVICE FROM HEART, EXTERNAL APPROACH: ICD-10-PCS | Performed by: RADIOLOGY

## 2024-06-26 PROCEDURE — 77001 FLUOROGUIDE FOR VEIN DEVICE: CPT | Performed by: RADIOLOGY

## 2024-06-26 PROCEDURE — 02H633Z INSERTION OF INFUSION DEVICE INTO RIGHT ATRIUM, PERCUTANEOUS APPROACH: ICD-10-PCS | Performed by: RADIOLOGY

## 2024-06-26 PROCEDURE — 6360000002 HC RX W HCPCS: Performed by: SURGERY

## 2024-06-26 PROCEDURE — 6370000000 HC RX 637 (ALT 250 FOR IP): Performed by: SURGERY

## 2024-06-26 PROCEDURE — 77001 FLUOROGUIDE FOR VEIN DEVICE: CPT

## 2024-06-26 PROCEDURE — 0JH63XZ INSERTION OF TUNNELED VASCULAR ACCESS DEVICE INTO CHEST SUBCUTANEOUS TISSUE AND FASCIA, PERCUTANEOUS APPROACH: ICD-10-PCS | Performed by: RADIOLOGY

## 2024-06-26 PROCEDURE — 6360000002 HC RX W HCPCS: Performed by: RADIOLOGY

## 2024-06-26 PROCEDURE — 83735 ASSAY OF MAGNESIUM: CPT

## 2024-06-26 PROCEDURE — 80048 BASIC METABOLIC PNL TOTAL CA: CPT

## 2024-06-26 PROCEDURE — 85025 COMPLETE CBC W/AUTO DIFF WBC: CPT

## 2024-06-26 PROCEDURE — 6370000000 HC RX 637 (ALT 250 FOR IP)

## 2024-06-26 PROCEDURE — 6370000000 HC RX 637 (ALT 250 FOR IP): Performed by: NURSE PRACTITIONER

## 2024-06-26 PROCEDURE — 36415 COLL VENOUS BLD VENIPUNCTURE: CPT

## 2024-06-26 PROCEDURE — 2500000003 HC RX 250 WO HCPCS: Performed by: RADIOLOGY

## 2024-06-26 PROCEDURE — 36558 INSERT TUNNELED CV CATH: CPT

## 2024-06-26 RX ORDER — LIDOCAINE HYDROCHLORIDE 20 MG/ML
INJECTION, SOLUTION INFILTRATION; PERINEURAL PRN
Status: DISCONTINUED | OUTPATIENT
Start: 2024-06-26 | End: 2024-06-26 | Stop reason: HOSPADM

## 2024-06-26 RX ORDER — HEPARIN SODIUM 1000 [USP'U]/ML
INJECTION, SOLUTION INTRAVENOUS; SUBCUTANEOUS PRN
Status: DISCONTINUED | OUTPATIENT
Start: 2024-06-26 | End: 2024-06-26 | Stop reason: HOSPADM

## 2024-06-26 RX ADMIN — ALLOPURINOL 100 MG: 100 TABLET ORAL at 11:08

## 2024-06-26 RX ADMIN — LIDOCAINE HYDROCHLORIDE 10 ML: 20 INJECTION, SOLUTION INFILTRATION; PERINEURAL at 10:07

## 2024-06-26 RX ADMIN — AMLODIPINE BESYLATE 10 MG: 10 TABLET ORAL at 11:09

## 2024-06-26 RX ADMIN — CETIRIZINE HYDROCHLORIDE 5 MG: 10 TABLET, FILM COATED ORAL at 11:09

## 2024-06-26 RX ADMIN — HEPARIN SODIUM 3800 UNITS: 1000 INJECTION, SOLUTION INTRAVENOUS; SUBCUTANEOUS at 10:20

## 2024-06-26 RX ADMIN — SEVELAMER CARBONATE 800 MG: 800 TABLET, FILM COATED ORAL at 11:10

## 2024-06-26 RX ADMIN — HYDROMORPHONE HYDROCHLORIDE 0.5 MG: 1 INJECTION, SOLUTION INTRAMUSCULAR; INTRAVENOUS; SUBCUTANEOUS at 09:42

## 2024-06-26 ASSESSMENT — PAIN SCALES - GENERAL
PAINLEVEL_OUTOF10: 4
PAINLEVEL_OUTOF10: 0

## 2024-06-26 ASSESSMENT — PAIN - FUNCTIONAL ASSESSMENT: PAIN_FUNCTIONAL_ASSESSMENT: NONE - DENIES PAIN

## 2024-06-26 ASSESSMENT — PAIN DESCRIPTION - LOCATION: LOCATION: BACK

## 2024-06-26 NOTE — PROGRESS NOTES
Admit Date: 2024    POD 20 Days Post-Op    Procedure:  Procedure(s):  OPEN VENTRAL HERNIA REPAIR W/ MESH    Postoperative ileus POD2-11  TPN 24-24  Dialysis since 24    Subjective:   Just back from IR   Is thirsty  Objective:       Vitals:    24 1005 24 1010 24 1014 24 1027   BP:    (!) 126/58   Pulse: 84 86 85 86   Resp: 18 16 16 16   Temp:       TempSrc:       SpO2: 93% 100% 98% 97%   Weight:       Height:           Temp (24hrs), Av.1 °F (36.7 °C), Min:97.7 °F (36.5 °C), Max:98.6 °F (37 °C)    Intake/Output Summary (Last 24 hours) at 2024 1116  Last data filed at 2024 0929  Gross per 24 hour   Intake 684 ml   Output --   Net 684 ml           Anuric-HD  Physical Exam  HENT:      Mouth/Throat:      Mouth: Mucous membranes are moist.   Cardiovascular:      Rate and Rhythm: Normal rate and regular rhythm.      Pulses: Normal pulses.      Heart sounds: Normal heart sounds. No murmur heard.     No friction rub. No gallop.   Pulmonary:      Effort: Pulmonary effort is normal. No respiratory distress.      Breath sounds: Normal breath sounds.   Abdominal:      General: Bowel sounds are normal. There is no distension.      Palpations: Abdomen is soft.   Genitourinary:     Comments: Anuric-HD  Musculoskeletal:         General: Swelling present. Normal range of motion.      Cervical back: No rigidity.      Comments: 1+ nonpitting edema BUE BLE   Skin:     General: Skin is warm and dry.      Capillary Refill: Capillary refill takes less than 2 seconds.   Neurological:      Mental Status: She is alert and oriented to person, place, and time.   Psychiatric:         Behavior: Behavior normal.          Labs:   Recent Results (from the past 24 hour(s))   Basic Metabolic Panel    Collection Time: 24  3:57 AM   Result Value Ref Range    Sodium 137 136 - 145 mmol/L    Potassium 4.0 3.5 - 5.1 mmol/L    Chloride 97 (L) 98 - 107 mmol/L    CO2 23 20 - 28 mmol/L    Anion Gap 16  9 - 18 mmol/L    Glucose 105 (H) 70 - 99 mg/dL    BUN 75 (H) 8 - 23 MG/DL    Creatinine 10.60 (H) 0.60 - 1.10 MG/DL    Est, Glom Filt Rate 3 (L) >60 ml/min/1.73m2    Calcium 8.8 8.8 - 10.2 MG/DL   CBC with Auto Differential    Collection Time: 06/26/24  3:57 AM   Result Value Ref Range    WBC 13.0 (H) 4.3 - 11.1 K/uL    RBC 2.98 (L) 4.05 - 5.2 M/uL    Hemoglobin 8.0 (L) 11.7 - 15.4 g/dL    Hematocrit 26.4 (L) 35.8 - 46.3 %    MCV 88.6 82 - 102 FL    MCH 26.8 26.1 - 32.9 PG    MCHC 30.3 (L) 31.4 - 35.0 g/dL    RDW 14.6 11.9 - 14.6 %    Platelets 197 150 - 450 K/uL    MPV 10.8 9.4 - 12.3 FL    nRBC 0.00 0.0 - 0.2 K/uL    Differential Type AUTOMATED      Neutrophils % 73 43 - 78 %    Lymphocytes % 16 13 - 44 %    Monocytes % 5 4.0 - 12.0 %    Eosinophils % 4 0.5 - 7.8 %    Basophils % 0 0.0 - 2.0 %    Immature Granulocytes % 2 0.0 - 5.0 %    Neutrophils Absolute 9.5 (H) 1.7 - 8.2 K/UL    Lymphocytes Absolute 2.1 0.5 - 4.6 K/UL    Monocytes Absolute 0.6 0.1 - 1.3 K/UL    Eosinophils Absolute 0.5 0.0 - 0.8 K/UL    Basophils Absolute 0.1 0.0 - 0.2 K/UL    Immature Granulocytes Absolute 0.2 0.0 - 0.5 K/UL   Magnesium    Collection Time: 06/26/24  3:57 AM   Result Value Ref Range    Magnesium 1.6 (L) 1.8 - 2.4 mg/dL   Phosphorus    Collection Time: 06/26/24  3:57 AM   Result Value Ref Range    Phosphorus 6.5 (H) 2.5 - 4.5 MG/DL       Assessment:     Principal Problem (Resolved):    Ventral hernia without obstruction or gangrene  Active Problems:    Morbid obesity (HCC)    CKD (chronic kidney disease) stage V requiring chronic dialysis (HCC)        Plan/Recommendations/Medical Decision Making:     PLAN 6/26/24  Now has Tunneled IR catheter.   DC to home for Thursday HD OP start (HD will not start new patients on Saturdays)    Tolerating regular consistency diet low K+, low sodium, low Phos with no nausea or vomiting.  Positive BM    Case management assisting with discharge disposition-home with home health    Outpatient

## 2024-06-26 NOTE — PROGRESS NOTES
D/C from unit with belongings. Accompanied by family and hospital personnel.  No distress at time of D/C.  Transported via w/c.

## 2024-06-26 NOTE — PROGRESS NOTES
Pt's D/C instructions completed.  Verbalized understanding of all instructions including diet, activity, s/sx to alert MD, medications, wound care, and f/u appointment.  Family at BS.

## 2024-06-26 NOTE — DISCHARGE INSTRUCTIONS
(unless your doctor has told you not to).  Ask your doctor for a different pain medicine.   Incision care    Your doctor will tell you how to care for the incision at the insertion site. (It's usually on your neck.) It may have stitches, strips of tape, or a gauze dressing. Your doctor will tell you when the stitches will be removed. The strips of tape will fall off in 3 to 5 days. The gauze dressing can be removed after 2 days.     Your doctor will tell you how to care for the incision on your chest where the catheter is. It will likely have a clear or gauze dressing on it. A clear dressing usually needs to be changed about 2 days after the procedure and then once a week. A gauze dressing needs to be changed 2 or 3 times a week. Also, change the dressing right away if it becomes wet, loose, or dirty.   Other instructions    Go to all appointments to flush the line. This keeps it open. A nurse or other health professional will flush the line.     Do not wear jewelry, such as necklaces, that can catch on the catheter.     If the catheter breaks, follow the instructions your doctor gave you. If you have no instructions, clamp or tie off the catheter. Then, see a doctor as soon as possible.     To help prevent infection, take a shower instead of a bath. Do not go swimming with the catheter.     Try to keep the area dry. When you shower, cover the area with waterproof material, such as plastic wrap.     Never touch the open end of the catheter if the cap is off.     Never use scissors, knives, pins, or other sharp objects near the catheter or other tubing.     If your catheter has a clamp, keep it clamped when you are not using it.     Fasten or tape the catheter to your body to prevent pulling or dangling.     Avoid clothing that rubs or pulls on your catheter.     Avoid bending or crimping your catheter.     Always wash your hands before you touch your catheter.     Wear loose clothing over the catheter for the first  consciousness).     You are short of breath.   Call your doctor now or seek immediate medical care if:    You are sick to your stomach and cannot drink fluids.     You have signs of a blood clot in your leg (called a deep vein thrombosis), such as:  Pain in your calf, back of the knee, thigh, or groin.  Redness and swelling in your leg or groin.     You have signs of infection, such as:  Increased pain, swelling, warmth, or redness.  Red streaks leading from the incision.  Pus draining from the incision.  A fever.     You cannot pass stools or gas.     You have pain that does not get better after you take pain medicine.     You have loose stitches, or your incision comes open.     Bright red blood has soaked through the bandage over your incision.   Watch closely for changes in your health, and be sure to contact your doctor if you have any problems.  Where can you learn more?  Go to https://www.[employer].net/patientEd and enter B577 to learn more about \"Abdominal Hernia Repair: What to Expect at Home.\"  Current as of: July 26, 2023  Content Version: 14.1  © 7068-1584 [employer].   Care instructions adapted under license by [employer]. If you have questions about a medical condition or this instruction, always ask your healthcare professional. [employer] disclaims any warranty or liability for your use of this information.

## 2024-06-26 NOTE — PROGRESS NOTES
TRANSFER - OUT REPORT:           Verbal report given to NAT Acuña(name) on Triny Ruvalcaba  being transferred to [facility](unit) for  routine post-op            Report consisted of patient’s Situation, Background, Assessment and      Recommendations(SBAR).          Information from the following report(s) SBAR and Procedure Summary was reviewed with the receiving nurse.       Opportunity for questions and clarification was provided.      Pt tolerated procedure well.          VITALS:  BP (!) 126/58   Pulse 86   Temp 97.9 °F (36.6 °C) (Infrared)   Resp 16   Ht 1.651 m (5' 5\")   Wt 113 kg (249 lb 1.6 oz)   SpO2 97%   BMI 41.45 kg/m²

## 2024-06-26 NOTE — CARE COORDINATION
Patient has discharge orders to go home today with [facility] (PT, OT, and RN services). Patient has been accepted to [facility] [place] location and first session will be tomorrow. Patient and patient's niece are aware and in agreement with this discharge plan today.     No CM needs voiced or noted.     ASSESSMENT NOTE    Attending Physician: Harrison Crain MD  Admit Problem: Ventral hernia without obstruction or gangrene [K43.9]  Morbid obesity (HCC) [E66.01]  Date/Time of Admission: 6/6/2024  9:17 AM  Problem List:  Patient Active Problem List   Diagnosis    Chronic low back pain with bilateral sciatica    DDD (degenerative disc disease), lumbar    Primary fibromyalgia    History of small bowel obstruction    Hypokalemia    Obesity    Obesity, morbid (HCC)    Allergic rhinitis due to pollen    Allergic conjunctivitis of both eyes    Allergy    Arthritis of both knees    Irritable bowel syndrome with diarrhea    Stage 4 chronic kidney disease (HCC)    Benign hypertension with chronic kidney disease    IFG (impaired fasting glucose)    Chronic kidney disease, stage 5 (HCC)    Chronic kidney disease (CKD), stage V (HCC) [334595]    Abdominal pain    Acute UTI    Difficult or painful urination    Hypercholesterolemia    Iron deficiency anemia    Metabolic acidosis    Morbid obesity (HCC)    CKD (chronic kidney disease) stage V requiring chronic dialysis (HCC)       Service Assessment  Patient Orientation Alert and Oriented   Cognition Alert   History Provided By Patient, Child/Family, Medical Record   Primary Caregiver Self   Accompanied By/Relationship     Support Systems Children, Family Members   Patient's Healthcare Decision Maker is: Legal Next of Kin   PCP Verified by CM Yes   Last Visit to PCP Within last 3 months (5/24/2024)   Prior Functional Level Independent in ADLs/IADLs   Current Functional Level Independent in ADLs/IADLs   Can patient return to prior living arrangement Yes    Ability to make needs known: Good   Family able to assist with home care needs: Yes   Would you like for me to discuss the discharge plan with any other family members/significant others, and if so, who? No   Financial Resources Medicare   Community Resources None   CM/SW Referral Other (see comment) (none)     Social/Functional History  Lives With Son   Type of Home House   Home Layout One level   Home Access Ramped entrance   Entrance Stairs - Number of Steps     Entrance Stairs - Rails     Bathroom Shower/Tub     Bathroom Toilet     Bathroom Equipment     Bathroom Accessibility     Home Equipment Rollator, Walker - Rolling (was not using prior to admission)   Receives Help From     ADL Assistance Independent   Bath     Dressing     Grooming     Feeding     Toileting     Homemaking Assistance     Meal Prep     Laundry     Vacuuming     Cleaning     Gardening     Yard Work     Driving     Shopping          Other (Comment)     Homemaking Responsibilities     Meal Prep Responsibility     Laundry Responsibility     Cleaning Responsibility     Bill Paying/Finance Responsibility     Shopping Responsibility     Dependent Care Responsibility     Health Care Management     Other (Comment)     Ambulation Assistance Independent   Transfer Assistance     Active  Yes   Patient's  Info     Mode of Transportation     Education     Occupation Retired   Type of Occupation       Discharge Planning   Type of Residence House   Living Arrangements Children   Support Systems Children, Family Members   Current Services Prior To Admission None   Potential Assistance Needed N/A   DME     DME     DME Ordered? No   Potential Assistance Purchasing Medications No   Meds-to-Beds: Does the patient want to have any new prescriptions delivered to bedside prior to discharge?     Type of Home Care Services None   Patient expects to be discharged to: House   Follow Up Appointment: Best Day/Time     One/Two Story Residence:

## 2024-06-26 NOTE — OR NURSING
Continue medications as prescribed. Stay active. Stay hydrated with water. Wear sunscreen. Stretch before bed. Recommend COVID booster and flu shot in October. Mammogram ordered. Scheduling center to call with appointment. Labs today. Refill of [place] to be sent by Dr. Blair. Follow-up in 6 months for recheck.      TRANSFER - OUT REPORT:           Verbal report given to NAT Morrison(name) on Triny Ruvalcaba  being transferred to [facility](unit) for  routine progression of patient care            Report consisted of patient’s Situation, Background, Assessment and      Recommendations(SBAR).          Information from the following report(s) SBAR and Procedure Summary was reviewed with the receiving nurse.       Opportunity for questions and clarification was provided.      Pt tolerated procedure well.      VITALS:  BP (!) 126/58   Pulse 86   Temp 97.9 °F (36.6 °C) (Infrared)   Resp 16   Ht 1.651 m (5' 5\")   Wt 113 kg (249 lb 1.6 oz)   SpO2 97%   BMI 41.45 kg/m²

## 2024-06-26 NOTE — PROGRESS NOTES
Triny Ruvalcaba  Admission Date: 6/6/2024         [facility] Progress Note: 6/26/2024    Follow-up for: CKD V    The patient's chart is reviewed and the patient is discussed with the staff.    Subjective:    Pt seen and examined in room sitting up on edge of the bed, reports hungry, denies pain.   S/p TCC placed by IT 6/26    ROS:  Gen - no fever, no chills  CV - no chest pain  Lung - no shortness of breath, no cough  Abd -  tenderness- better, no nausea/vomiting  Ext -  edema- better    Current Facility-Administered Medications   Medication Dose Route Frequency    lidocaine 2 % injection    PRN    heparin (porcine) injection    PRN    allopurinol (ZYLOPRIM) tablet 100 mg  100 mg Oral Daily    sevelamer (RENVELA) tablet 800 mg  800 mg Oral TID WC    heparin (porcine) injection 5,000 Units  5,000 Units IntraCATHeter PRN    polyethylene glycol (GLYCOLAX) packet 17 g  17 g Oral Daily PRN    diatrizoate meglumine-sodium (GASTROGRAFIN) 66-10 % solution 15 mL  15 mL Oral ONCE PRN    fat emulsion (INTRALIPID/NUTRILIPID) 20 % infusion 250 mL  250 mL IntraVENous Q MWF    pantoprazole (PROTONIX) 40 mg in sodium chloride (PF) 0.9 % 10 mL injection  40 mg IntraVENous Q12H    bisacodyl (DULCOLAX) suppository 10 mg  10 mg Rectal Daily    hydrALAZINE (APRESOLINE) injection 10 mg  10 mg IntraVENous Q6H PRN    bisacodyl (DULCOLAX) suppository 10 mg  10 mg Rectal Daily PRN    amLODIPine (NORVASC) tablet 10 mg  10 mg Oral Daily    fluticasone (FLONASE) 50 MCG/ACT nasal spray 1 spray  1 spray Each Nostril Daily    cetirizine (ZYRTEC) tablet 5 mg  5 mg Oral Daily    ketotifen fumarate (ZADITOR) 0.035 % ophthalmic solution 1 drop  1 drop Both Eyes BID    sodium chloride flush 0.9 % injection 5-40 mL  5-40 mL IntraVENous 2 times per day    sodium chloride flush 0.9 % injection 5-40 mL  5-40 mL IntraVENous PRN    0.9 % sodium chloride infusion   IntraVENous PRN    potassium chloride 10 mEq/100 mL IVPB  (Peripheral Line)  10 mEq IntraVENous PRN    magnesium sulfate 1000 mg in dextrose 5% 100 mL IVPB  1,000 mg IntraVENous PRN    prochlorperazine (COMPAZINE) tablet 10 mg  10 mg Oral Q6H PRN    Or    prochlorperazine (COMPAZINE) injection 10 mg  10 mg IntraVENous Q6H PRN    heparin (porcine) injection 5,000 Units  5,000 Units SubCUTAneous BID    simethicone (MYLICON) chewable tablet 80 mg  80 mg Oral Q6H PRN    HYDROmorphone (DILAUDID) injection 0.5 mg  0.5 mg IntraVENous Q3H PRN    HYDROmorphone (DILAUDID) injection 1 mg  1 mg IntraVENous Q3H PRN    oxyCODONE (ROXICODONE) immediate release tablet 5 mg  5 mg Oral Q4H PRN    oxyCODONE (ROXICODONE) immediate release tablet 10 mg  10 mg Oral Q4H PRN         Objective:     Vitals:    06/26/24 1005 06/26/24 1010 06/26/24 1014 06/26/24 1027   BP:    (!) 126/58   Pulse: 84 86 85 86   Resp: 18 16 16 16   Temp:       TempSrc:       SpO2: 93% 100% 98% 97%   Weight:       Height:         Intake and Output:   06/24 1901 - 06/26 0700  In: 920 [P.O.:920]  Out: -   No intake/output data recorded.    Physical Exam:   Constitutional:  the patient is well developed and in no acute distress, alert and following commands  HEENT:  Sclera clear, pupils equal, oral mucosa moist  Lungs: clear bilaterally   Cardiovascular:  Regular rate, S1, S2, no Rub   Abd/GI: soft and non-tender; with positive bowel sounds.  Ext: warm without cyanosis. There is trace lower leg edema.  Skin:  no jaundice or rashes  Neuro: no gross neuro deficits   Psychiatric: Calm.   Access: Right TTC- intact       LAB  Recent Labs     06/25/24  0355 06/26/24 0357   WBC 14.9* 13.0*   HGB 8.8* 8.0*   HCT 28.1* 26.4*    197       Recent Labs     06/24/24  0601 06/25/24  0355 06/26/24  0357    137 137   K 3.9 3.8 4.0   CL 95* 98 97*   CO2 21 23 23   BUN 82* 59* 75*   CREATININE 11.00* 8.08* 10.60*   MG 1.9  --  1.6*   PHOS 7.3*  --  6.5*       No results for input(s): \"PH\", \"PCO2\", \"PO2\", \"HCO3\" in the last 72

## 2024-06-26 NOTE — PROGRESS NOTES
TRANSFER - IN REPORT:    Verbal report received from Sushil grijalva Triny Ruvalcaba  being received from PACU for routine post-op      Report consisted of patient's Situation, Background, Assessment and   Recommendations(SBAR).     Information from the following report(s) Surgery Report, Intake/Output, MAR, and Recent Results was reviewed with the receiving nurse.    Opportunity for questions and clarification was provided.      Assessment completed upon patient's arrival to unit and care assumed.

## 2024-06-26 NOTE — PROGRESS NOTES
Occupational Therapy Note:    Attempted to see patient this AM for occupational therapy treatment  session. Patient GABRIEL at IR at time of attempt. Will follow and re-attempt as schedule permits/patient available.      Thank you,    EMMANUELLE MATTA, OT    Rehab Caseload Tracker

## 2024-06-26 NOTE — PROGRESS NOTES
END OF SHIFT NOTE:    INTAKE/OUTPUT  06/25 0701 - 06/26 0700  In: 920 [P.O.:920]  Out: -   Voiding: NO  Catheter: No  Drain:              Flatus: Patient does have flatus present.    Stool:  occurrences.    Characteristics:  Stool Appearance: Loose, Watery  Stool Color: Green  Stool Amount: Medium  Stool Assessment  Incontinence: Yes  Stool Appearance: Loose, Watery  Stool Color: Green  Stool Amount: Medium  Stool Source: Rectum  Last BM (including prior to admit): 06/25/24 (per patient)    Emesis:  occurrences.    Characteristics:   Emesis Appearance: Undigested food    VITAL SIGNS  Patient Vitals for the past 12 hrs:   Temp Pulse Resp BP SpO2   06/26/24 0651 97.9 °F (36.6 °C) 88 18 (!) 118/57 95 %   06/26/24 0439 98.1 °F (36.7 °C) 88 17 110/66 93 %   06/26/24 0135 -- 88 -- 97/60 --   06/26/24 0010 98.6 °F (37 °C) 89 17 (!) 99/50 95 %   06/25/24 2014 98.6 °F (37 °C) 89 17 118/61 96 %       Pain Assessment  Pain Level: 3 (06/24/24 0837)  Pain Location: Back  Patient's Stated Pain Goal: 3    Ambulating  Yes    Shift report given to oncoming nurse at the bedside.    Lita Huitron RN

## 2024-06-26 NOTE — PROGRESS NOTES
Reviewed vein mapping..  Showed no usable superficial veins.  Left brachial vein looks marginal measuring 4 mm.  With patient body habitus will most likely plan for left forearm loop graft.  This can be scheduled as an outpatient.  Patient can follow-up in my office in 2 weeks with repeat vein mapping    Harrison Germain

## 2024-06-27 ENCOUNTER — CARE COORDINATION (OUTPATIENT)
Dept: CARE COORDINATION | Facility: CLINIC | Age: 79
End: 2024-06-27

## 2024-06-27 NOTE — CARE COORDINATION
Care Transitions Note    Initial Call - Call within 2 business days of discharge: Yes    Attempted to reach patient for transitions of care follow up. Unable to reach patient.    Outreach Attempts:   Patient unavailable to talk at time of call. Per family she is at Dialysis. Family to have patient call CTN upon return. CTN will continue to outreach if no return call received.    Patient: Triny Ruvalcaba    Patient : 1945   MRN: [id number]    Reason for Admission: Ventral hernia without obstruction or gangrene   Discharge Date: 24  RURS: Readmission Risk Score: 19.4    Last Discharge Facility       Date Complaint Diagnosis Description Type Department Provider    24  Chronic kidney disease, stage 5 (HCC) Admission (Discharged) [id number] Harrison Crain MD            Was this an external facility discharge? No    Follow Up Appointment:   Patient has hospital follow up appointment scheduled within 7 days of discharge.    Future Appointments         Provider Specialty Dept Phone    2024 10:15 AM Harrison Germain MD Vascular Surgery [phone]    2024 2:15 PM Howie Francisco PA General Surgery [phone]    2024 3:00 PM Arias Dueñas DO Internal Medicine [phone]            Plan for follow-up on next business day.      Eloisa Burch RN

## 2024-06-28 ENCOUNTER — CARE COORDINATION (OUTPATIENT)
Dept: CARE COORDINATION | Facility: CLINIC | Age: 79
End: 2024-06-28

## 2024-06-28 DIAGNOSIS — N18.5 CHRONIC KIDNEY DISEASE (CKD), STAGE V (HCC): Primary | ICD-10-CM

## 2024-06-28 PROCEDURE — 1111F DSCHRG MED/CURRENT MED MERGE: CPT | Performed by: FAMILY MEDICINE

## 2024-06-28 NOTE — CARE COORDINATION
Care Transitions Note    Initial Call - Call within 2 business days of discharge: Yes    Patient Current Location:  Home: [address] [place] [id number]    Care Transition Nurse contacted the patient by telephone to perform post hospital discharge assessment, verified name and  as identifiers. Provided introduction to self, and explanation of the Care Transition Nurse role.     Patient: Triny Ruvalcaba    Patient : 1945   MRN: [id number]    Reason for Admission: Chronic Kidney Disease stage V requiring dialysis  Discharge Date: 24  RURS: Readmission Risk Score: 19.4      Last Discharge Facility       Date Complaint Diagnosis Description Type Department Provider    24  Chronic kidney disease, stage 5 (HCC) Admission (Discharged) [id number] Gan MD            Was this an external facility discharge? No    Additional needs identified to be addressed with provider   No needs identified             Method of communication with provider: none.    Patients top risk factors for readmission: medical condition-CKD stage V requiring dialysis  Due to ESRD/hemodialysis and co-morbidity diagnoses patient will probably have health episodes that will warrant unavoidable hospitalizations and medical interventions for the remainder of the patient's lifetime  S/P open ventral hernia repair with mesh on 2024  Interventions to address risk factors:   Education: Discussed with patient the importance of taking medications as ordered and attending dialysis as scheduled T,T,S at  [facility] [place]  Home Health: CTN contacted [facility] to confirm referral received and to inform that patient attends dialysis T,TS. Office to outreach to patient today to schedule initial visit.  Reviewed discharge instructions and offered opportunity to ask any questions regarding discharge instructions.  Confirmed medications obtained and taking as ordered  Reviewed upcoming follow up

## 2024-07-01 ENCOUNTER — TELEPHONE (OUTPATIENT)
Dept: SURGERY | Age: 79
End: 2024-07-01

## 2024-07-01 NOTE — TELEPHONE ENCOUNTER
Nurse Michaud with [facility] called to obtain verbal authorization to see patient for nursing services, PT, and OT. Nurse wants to know if patient has any restrictions such as lifting restrictions? Also wants orders for incision care. Nurse states that patient has a \"little bit of drainage from the bottom of her incision\". Also wants to know about removing her staples, should they remove? Patient does have a post-op scheduled with Howie on 7/3/2024.

## 2024-07-01 NOTE — PROGRESS NOTES
[facility]      Triny Ruvalcaba   presents for follow-up after VHR by Dr. Crain.  She reports no problems with eating, bowel movements, voiding. She is complaining of some thick white discharge from her incision site. No fevers or chills.     EXAM:  She  is in no distress  There is mild residual tenderness in the abdomen   Incision: some surrounding erythema and greenish discharge.         ASSESSMENT/PLAN:    No diagnosis found.     Diet as tolerated  Take clindamycin as prescribed.  No heavy lifting for 4-6 weeks  Return in 1 week for reevaluation or sooner with worsening of symptoms.    No follow-ups on file.     DALTON Phipps

## 2024-07-03 ENCOUNTER — OFFICE VISIT (OUTPATIENT)
Dept: SURGERY | Age: 79
End: 2024-07-03
Payer: MEDICARE

## 2024-07-03 DIAGNOSIS — T81.49XA INCISIONAL INFECTION: ICD-10-CM

## 2024-07-03 DIAGNOSIS — K43.9 VENTRAL HERNIA WITHOUT OBSTRUCTION OR GANGRENE: Primary | ICD-10-CM

## 2024-07-03 PROCEDURE — 99213 OFFICE O/P EST LOW 20 MIN: CPT

## 2024-07-03 PROCEDURE — 1123F ACP DISCUSS/DSCN MKR DOCD: CPT

## 2024-07-03 RX ORDER — CLINDAMYCIN HYDROCHLORIDE 300 MG/1
300 CAPSULE ORAL 3 TIMES DAILY
Qty: 21 CAPSULE | Refills: 0 | Status: SHIPPED | OUTPATIENT
Start: 2024-07-03 | End: 2024-07-10

## 2024-07-03 NOTE — PATIENT INSTRUCTIONS
- Take clindamycin for possible incisional infection.  - Wait until 4-6 weeks post op prior to returning to full activity.  - Call with any questions or concerns.  - Return to clinic in one week for re-evaluation.

## 2024-07-05 ENCOUNTER — CARE COORDINATION (OUTPATIENT)
Dept: CARE COORDINATION | Facility: CLINIC | Age: 79
End: 2024-07-05

## 2024-07-05 NOTE — CARE COORDINATION
Care Transitions Note    Follow Up Call     Attempted to reach patient for transitions of care follow up.  Unable to reach patient.      Outreach Attempts:   Unable to leave message.       Follow Up Appointment:   Future Appointments         Provider Specialty Dept Phone    7/10/2024 3:15 PM Howie Francisco PA General Surgery [phone]    7/12/2024 11:00 AM Harrison Germain MD Vascular Surgery [phone]    11/20/2024 3:00 PM Arias Dueñas,  Internal Medicine [phone]            Plan for follow-up call in 6-10 days based on severity of symptoms and risk factors. Plan for next call: symptom management    Vicenta Persaud LPN

## 2024-07-08 NOTE — PROGRESS NOTES
[facility]      Triny Ruvalcaba   presents for follow-up after a VHR by Dr. Crain.  She reports no problems with eating, bowel movements, voiding. She is having continued issues with her wound. No pain or fevers. She notes some bloody and purulent discharge.     EXAM:  She  is in no distress  There is minimal residual tenderness in the abdomen   Incision:       ASSESSMENT/PLAN:    1. Incisional infection    2. Ventral hernia without obstruction or gangrene         - Follow up with Dr. Crain in 1 week.  - Wet to dry packing daily.  - Call with worsening of symptoms.    Return in about 1 week (around 7/17/2024).     DALTON Phipps

## 2024-07-10 ENCOUNTER — HOSPITAL ENCOUNTER (OUTPATIENT)
Age: 79
Setting detail: SPECIMEN
Discharge: HOME | End: 2024-07-13
Payer: MEDICARE

## 2024-07-10 ENCOUNTER — OFFICE VISIT (OUTPATIENT)
Dept: SURGERY | Age: 79
End: 2024-07-10
Payer: MEDICARE

## 2024-07-10 VITALS — HEART RATE: 95 BPM | DIASTOLIC BLOOD PRESSURE: 55 MMHG | TEMPERATURE: 98.7 F | SYSTOLIC BLOOD PRESSURE: 114 MMHG

## 2024-07-10 DIAGNOSIS — T81.49XA INCISIONAL INFECTION: Primary | ICD-10-CM

## 2024-07-10 DIAGNOSIS — K43.9 VENTRAL HERNIA WITHOUT OBSTRUCTION OR GANGRENE: ICD-10-CM

## 2024-07-10 PROCEDURE — 87205 SMEAR GRAM STAIN: CPT

## 2024-07-10 PROCEDURE — 1123F ACP DISCUSS/DSCN MKR DOCD: CPT

## 2024-07-10 PROCEDURE — 87075 CULTR BACTERIA EXCEPT BLOOD: CPT

## 2024-07-10 PROCEDURE — 99213 OFFICE O/P EST LOW 20 MIN: CPT

## 2024-07-10 PROCEDURE — 87070 CULTURE OTHR SPECIMN AEROBIC: CPT

## 2024-07-11 ENCOUNTER — CARE COORDINATION (OUTPATIENT)
Dept: CARE COORDINATION | Facility: CLINIC | Age: 79
End: 2024-07-11

## 2024-07-11 NOTE — CARE COORDINATION
Care Transitions Note    Follow Up Call     Attempted to reach patient for transitions of care follow up.  Unable to reach patient.      Outreach Attempts:   HIPAA compliant voicemail left for patient.       Follow Up Appointment:   Future Appointments         Provider Specialty Dept Phone    7/12/2024 11:00 AM Harrison Germain MD Vascular Surgery [phone]    7/17/2024 3:15 PM Howie Francisco PA General Surgery [phone]    11/20/2024 3:00 PM Arias Dueñas,  Internal Medicine [phone]            Plan for follow-up call in 6-10 days based on severity of symptoms and risk factors. Plan for next call: self management    Vicenta Persaud LPN

## 2024-07-12 ENCOUNTER — PREP FOR PROCEDURE (OUTPATIENT)
Dept: VASCULAR SURGERY | Age: 79
End: 2024-07-12

## 2024-07-12 ENCOUNTER — OFFICE VISIT (OUTPATIENT)
Dept: VASCULAR SURGERY | Age: 79
End: 2024-07-12
Payer: MEDICARE

## 2024-07-12 VITALS
BODY MASS INDEX: 41.48 KG/M2 | SYSTOLIC BLOOD PRESSURE: 137 MMHG | WEIGHT: 249 LBS | HEIGHT: 65 IN | DIASTOLIC BLOOD PRESSURE: 82 MMHG | OXYGEN SATURATION: 98 % | HEART RATE: 77 BPM

## 2024-07-12 DIAGNOSIS — I73.9 PVD (PERIPHERAL VASCULAR DISEASE) WITH CLAUDICATION (HCC): Primary | ICD-10-CM

## 2024-07-12 DIAGNOSIS — N18.6 ESRD (END STAGE RENAL DISEASE) (HCC): ICD-10-CM

## 2024-07-12 LAB
BACTERIA SPEC CULT: NORMAL
GRAM STN SPEC: NORMAL
GRAM STN SPEC: NORMAL
SERVICE CMNT-IMP: NORMAL

## 2024-07-12 PROCEDURE — G8417 CALC BMI ABV UP PARAM F/U: HCPCS | Performed by: SURGERY

## 2024-07-12 PROCEDURE — G8399 PT W/DXA RESULTS DOCUMENT: HCPCS | Performed by: SURGERY

## 2024-07-12 PROCEDURE — 1123F ACP DISCUSS/DSCN MKR DOCD: CPT | Performed by: SURGERY

## 2024-07-12 PROCEDURE — G8427 DOCREV CUR MEDS BY ELIG CLIN: HCPCS | Performed by: SURGERY

## 2024-07-12 PROCEDURE — 99204 OFFICE O/P NEW MOD 45 MIN: CPT | Performed by: SURGERY

## 2024-07-12 PROCEDURE — 4004F PT TOBACCO SCREEN RCVD TLK: CPT | Performed by: SURGERY

## 2024-07-12 PROCEDURE — 1090F PRES/ABSN URINE INCON ASSESS: CPT | Performed by: SURGERY

## 2024-07-12 PROCEDURE — 1111F DSCHRG MED/CURRENT MED MERGE: CPT | Performed by: SURGERY

## 2024-07-15 NOTE — PROGRESS NOTES
[facility]      Triny Ruvalcaba   presents for follow-up after a ventral hernia repair by Dr. Crain.  She reports no problems with eating, bowel movements, voiding, or their wound and no ongoing postoperative problems.      EXAM:  She  is in no distress  There is minimal residual tenderness in the abdomen   Incision: healing well, no seroma, no cellulitis        ASSESSMENT/PLAN:    No diagnosis found.     Diet as tolerated  No heavy lifting for 4-6 weeks  Return sooner with worsening of symptoms.    No follow-ups on file.     DALTON Phipps

## 2024-07-17 ENCOUNTER — OFFICE VISIT (OUTPATIENT)
Dept: SURGERY | Age: 79
End: 2024-07-17
Payer: MEDICARE

## 2024-07-17 VITALS
HEIGHT: 65 IN | WEIGHT: 234.7 LBS | DIASTOLIC BLOOD PRESSURE: 60 MMHG | SYSTOLIC BLOOD PRESSURE: 122 MMHG | HEART RATE: 95 BPM | BODY MASS INDEX: 39.1 KG/M2

## 2024-07-17 DIAGNOSIS — T81.30XA WOUND DEHISCENCE: Primary | ICD-10-CM

## 2024-07-17 LAB
BACTERIA SPEC CULT: NORMAL
SERVICE CMNT-IMP: NORMAL

## 2024-07-17 PROCEDURE — 1111F DSCHRG MED/CURRENT MED MERGE: CPT

## 2024-07-17 PROCEDURE — 4004F PT TOBACCO SCREEN RCVD TLK: CPT

## 2024-07-17 PROCEDURE — 1123F ACP DISCUSS/DSCN MKR DOCD: CPT

## 2024-07-17 PROCEDURE — G8417 CALC BMI ABV UP PARAM F/U: HCPCS

## 2024-07-17 PROCEDURE — G8427 DOCREV CUR MEDS BY ELIG CLIN: HCPCS

## 2024-07-17 PROCEDURE — 99213 OFFICE O/P EST LOW 20 MIN: CPT

## 2024-07-17 PROCEDURE — G8399 PT W/DXA RESULTS DOCUMENT: HCPCS

## 2024-07-17 PROCEDURE — 1090F PRES/ABSN URINE INCON ASSESS: CPT

## 2024-07-17 NOTE — PROGRESS NOTES
[facility]      Triny Ruvalcaba   presents for follow-up after open ventral hernia repair by Dr. Crain.  She reports that the wound is starting to heal better. Some pain yesterday but nothing prior or today. No new concerns. Otherwise doing well.    EXAM:  She  is in no distress  Wound appears to be healing a little more with wet to dry bandages.     Pathology:  Wound culture was negative.      ASSESSMENT/PLAN:    1. Wound dehiscence         Follow up with Dr. Crain at earliest convenience.  Call with any questions or concerns.     Return in about 1 week (around 7/24/2024).     DALTON Phipps

## 2024-07-19 ENCOUNTER — CARE COORDINATION (OUTPATIENT)
Dept: CARE COORDINATION | Facility: CLINIC | Age: 79
End: 2024-07-19

## 2024-07-19 NOTE — CARE COORDINATION
Care Transitions Note    Follow Up Call     Attempted to reach patient for transitions of care follow up.  Unable to reach patient.      Outreach Attempts:   HIPAA compliant voicemail left for patient.     Follow Up Appointment:   Future Appointments         Provider Specialty Dept Phone    8/5/2024 1:20 PM Harrison Crain MD General Surgery [phone]    8/16/2024 9:30 AM Harrison Germain MD Vascular Surgery [phone]    11/20/2024 3:00 PM Arias Dueñas,  Internal Medicine [phone]            Plan for follow-up call in 6-10 days based on severity of symptoms and risk factors. Plan for next call: symptom management    Vicenta Persaud LPN

## 2024-07-26 ENCOUNTER — CARE COORDINATION (OUTPATIENT)
Dept: CARE COORDINATION | Facility: CLINIC | Age: 79
End: 2024-07-26

## 2024-07-26 ENCOUNTER — HOSPITAL ENCOUNTER (OUTPATIENT)
Dept: LAB | Age: 79
End: 2024-07-26
Payer: MEDICARE

## 2024-07-26 DIAGNOSIS — Z01.818 PRE-OP TESTING: ICD-10-CM

## 2024-07-26 LAB — HGB BLD-MCNC: 10.5 G/DL (ref 11.7–15.4)

## 2024-07-26 PROCEDURE — 36415 COLL VENOUS BLD VENIPUNCTURE: CPT

## 2024-07-26 PROCEDURE — 85018 HEMOGLOBIN: CPT

## 2024-07-26 NOTE — PROGRESS NOTES
HGB within anesthesia guidelines, no follow-up required. Labs automatically routed to ordering provider via Epic documentation.    
Patient did not present to Outpatient Lab for preop labs - to be drawn day of surgery.    
make-up, nail polish, lotions, cologne, perfumes, powders, or oil on skin. Artificial nails are not permitted.

## 2024-07-26 NOTE — CARE COORDINATION
Patient has graduated from the Care Transitions program on 7/26/24.  Patient/family has the ability to self-manage at this time. Patient declined referral to the ACM team for further management.   Patient is scheduled for outpatient surgery (Left arm arteriovenous fistula creation) with Dr. Harrison Germain on 7/29/24.  No further questions or concerns voiced at present.     Patient has Care Transition Nurse's contact information for any further questions, concerns, or needs.  Patients upcoming visits:    Future Appointments   Date Time Provider Department Center   8/5/2024  1:20 PM Harrison Crain MD [facility]   8/16/2024  9:30 AM Harrison Germain MD [facility]   11/20/2024  3:00 PM Arias Dueñas, DO [facility]

## 2024-07-29 ENCOUNTER — ANESTHESIA (OUTPATIENT)
Dept: SURGERY | Age: 79
End: 2024-07-29
Payer: MEDICARE

## 2024-07-29 ENCOUNTER — ANESTHESIA EVENT (OUTPATIENT)
Dept: SURGERY | Age: 79
End: 2024-07-29
Payer: MEDICARE

## 2024-07-29 ENCOUNTER — APPOINTMENT (OUTPATIENT)
Dept: ULTRASOUND IMAGING | Age: 79
End: 2024-07-29
Attending: SURGERY
Payer: MEDICARE

## 2024-07-29 ENCOUNTER — HOSPITAL ENCOUNTER (OUTPATIENT)
Age: 79
Setting detail: OUTPATIENT SURGERY
Discharge: HOME | End: 2024-07-29
Attending: SURGERY | Admitting: SURGERY
Payer: MEDICARE

## 2024-07-29 VITALS
TEMPERATURE: 98 F | DIASTOLIC BLOOD PRESSURE: 67 MMHG | BODY MASS INDEX: 38.99 KG/M2 | HEART RATE: 84 BPM | HEIGHT: 65 IN | OXYGEN SATURATION: 92 % | SYSTOLIC BLOOD PRESSURE: 148 MMHG | WEIGHT: 234 LBS | RESPIRATION RATE: 16 BRPM

## 2024-07-29 DIAGNOSIS — N18.6 ESRD (END STAGE RENAL DISEASE) (HCC): Primary | ICD-10-CM

## 2024-07-29 DIAGNOSIS — Z01.818 PRE-OP TESTING: Primary | ICD-10-CM

## 2024-07-29 LAB — POTASSIUM BLD-SCNC: 4 MMOL/L (ref 3.5–5.1)

## 2024-07-29 PROCEDURE — 2709999900 HC NON-CHARGEABLE SUPPLY: Performed by: SURGERY

## 2024-07-29 PROCEDURE — 3600000013 HC SURGERY LEVEL 3 ADDTL 15MIN: Performed by: SURGERY

## 2024-07-29 PROCEDURE — 3700000000 HC ANESTHESIA ATTENDED CARE: Performed by: SURGERY

## 2024-07-29 PROCEDURE — 6360000002 HC RX W HCPCS: Performed by: SURGERY

## 2024-07-29 PROCEDURE — 6360000002 HC RX W HCPCS: Performed by: NURSE ANESTHETIST, CERTIFIED REGISTERED

## 2024-07-29 PROCEDURE — 6370000000 HC RX 637 (ALT 250 FOR IP): Performed by: ANESTHESIOLOGY

## 2024-07-29 PROCEDURE — 84132 ASSAY OF SERUM POTASSIUM: CPT

## 2024-07-29 PROCEDURE — 2580000003 HC RX 258: Performed by: ANESTHESIOLOGY

## 2024-07-29 PROCEDURE — 7100000011 HC PHASE II RECOVERY - ADDTL 15 MIN: Performed by: SURGERY

## 2024-07-29 PROCEDURE — 36821 AV FUSION DIRECT ANY SITE: CPT | Performed by: SURGERY

## 2024-07-29 PROCEDURE — 7100000000 HC PACU RECOVERY - FIRST 15 MIN: Performed by: SURGERY

## 2024-07-29 PROCEDURE — 2500000003 HC RX 250 WO HCPCS: Performed by: NURSE ANESTHETIST, CERTIFIED REGISTERED

## 2024-07-29 PROCEDURE — 3700000001 HC ADD 15 MINUTES (ANESTHESIA): Performed by: SURGERY

## 2024-07-29 PROCEDURE — 6360000002 HC RX W HCPCS

## 2024-07-29 PROCEDURE — 7100000001 HC PACU RECOVERY - ADDTL 15 MIN: Performed by: SURGERY

## 2024-07-29 PROCEDURE — 3600000003 HC SURGERY LEVEL 3 BASE: Performed by: SURGERY

## 2024-07-29 PROCEDURE — 7100000010 HC PHASE II RECOVERY - FIRST 15 MIN: Performed by: SURGERY

## 2024-07-29 RX ORDER — FENTANYL CITRATE 50 UG/ML
INJECTION, SOLUTION INTRAMUSCULAR; INTRAVENOUS PRN
Status: DISCONTINUED | OUTPATIENT
Start: 2024-07-29 | End: 2024-07-29 | Stop reason: SDUPTHER

## 2024-07-29 RX ORDER — HYDROMORPHONE HYDROCHLORIDE 2 MG/ML
0.5 INJECTION, SOLUTION INTRAMUSCULAR; INTRAVENOUS; SUBCUTANEOUS EVERY 5 MIN PRN
Status: DISCONTINUED | OUTPATIENT
Start: 2024-07-29 | End: 2024-07-29 | Stop reason: HOSPADM

## 2024-07-29 RX ORDER — OXYCODONE HYDROCHLORIDE 5 MG/1
5 TABLET ORAL
Status: COMPLETED | OUTPATIENT
Start: 2024-07-29 | End: 2024-07-29

## 2024-07-29 RX ORDER — DEXTROSE MONOHYDRATE 100 MG/ML
INJECTION, SOLUTION INTRAVENOUS CONTINUOUS PRN
Status: DISCONTINUED | OUTPATIENT
Start: 2024-07-29 | End: 2024-07-29 | Stop reason: HOSPADM

## 2024-07-29 RX ORDER — BUPIVACAINE HYDROCHLORIDE 2.5 MG/ML
INJECTION, SOLUTION EPIDURAL; INFILTRATION; INTRACAUDAL PRN
Status: DISCONTINUED | OUTPATIENT
Start: 2024-07-29 | End: 2024-07-29 | Stop reason: ALTCHOICE

## 2024-07-29 RX ORDER — LIDOCAINE HYDROCHLORIDE 10 MG/ML
1 INJECTION, SOLUTION INFILTRATION; PERINEURAL
Status: DISCONTINUED | OUTPATIENT
Start: 2024-07-29 | End: 2024-07-29 | Stop reason: HOSPADM

## 2024-07-29 RX ORDER — NALOXONE HYDROCHLORIDE 0.4 MG/ML
INJECTION, SOLUTION INTRAMUSCULAR; INTRAVENOUS; SUBCUTANEOUS PRN
Status: DISCONTINUED | OUTPATIENT
Start: 2024-07-29 | End: 2024-07-29 | Stop reason: HOSPADM

## 2024-07-29 RX ORDER — PROPOFOL 10 MG/ML
INJECTION, EMULSION INTRAVENOUS PRN
Status: DISCONTINUED | OUTPATIENT
Start: 2024-07-29 | End: 2024-07-29 | Stop reason: SDUPTHER

## 2024-07-29 RX ORDER — ONDANSETRON 2 MG/ML
INJECTION INTRAMUSCULAR; INTRAVENOUS PRN
Status: DISCONTINUED | OUTPATIENT
Start: 2024-07-29 | End: 2024-07-29 | Stop reason: SDUPTHER

## 2024-07-29 RX ORDER — SODIUM CHLORIDE 9 MG/ML
INJECTION, SOLUTION INTRAVENOUS PRN
Status: DISCONTINUED | OUTPATIENT
Start: 2024-07-29 | End: 2024-07-29 | Stop reason: HOSPADM

## 2024-07-29 RX ORDER — SODIUM CHLORIDE 0.9 % (FLUSH) 0.9 %
5-40 SYRINGE (ML) INJECTION PRN
Status: DISCONTINUED | OUTPATIENT
Start: 2024-07-29 | End: 2024-07-29 | Stop reason: HOSPADM

## 2024-07-29 RX ORDER — OXYCODONE HYDROCHLORIDE AND ACETAMINOPHEN 5; 325 MG/1; MG/1
1 TABLET ORAL EVERY 4 HOURS PRN
Qty: 30 TABLET | Refills: 0 | Status: SHIPPED | OUTPATIENT
Start: 2024-07-29 | End: 2024-08-03

## 2024-07-29 RX ORDER — LIDOCAINE HYDROCHLORIDE 20 MG/ML
INJECTION, SOLUTION EPIDURAL; INFILTRATION; INTRACAUDAL; PERINEURAL PRN
Status: DISCONTINUED | OUTPATIENT
Start: 2024-07-29 | End: 2024-07-29 | Stop reason: SDUPTHER

## 2024-07-29 RX ORDER — MIDAZOLAM HYDROCHLORIDE 2 MG/2ML
2 INJECTION, SOLUTION INTRAMUSCULAR; INTRAVENOUS
Status: DISCONTINUED | OUTPATIENT
Start: 2024-07-29 | End: 2024-07-29 | Stop reason: HOSPADM

## 2024-07-29 RX ORDER — FENTANYL CITRATE 50 UG/ML
100 INJECTION, SOLUTION INTRAMUSCULAR; INTRAVENOUS
Status: DISCONTINUED | OUTPATIENT
Start: 2024-07-29 | End: 2024-07-29 | Stop reason: HOSPADM

## 2024-07-29 RX ORDER — ONDANSETRON 2 MG/ML
4 INJECTION INTRAMUSCULAR; INTRAVENOUS
Status: DISCONTINUED | OUTPATIENT
Start: 2024-07-29 | End: 2024-07-29 | Stop reason: HOSPADM

## 2024-07-29 RX ORDER — DIPHENHYDRAMINE HYDROCHLORIDE 50 MG/ML
12.5 INJECTION INTRAMUSCULAR; INTRAVENOUS
Status: DISCONTINUED | OUTPATIENT
Start: 2024-07-29 | End: 2024-07-29 | Stop reason: HOSPADM

## 2024-07-29 RX ORDER — SUCCINYLCHOLINE CHLORIDE 20 MG/ML
INJECTION INTRAMUSCULAR; INTRAVENOUS PRN
Status: DISCONTINUED | OUTPATIENT
Start: 2024-07-29 | End: 2024-07-29 | Stop reason: SDUPTHER

## 2024-07-29 RX ORDER — FENTANYL CITRATE 50 UG/ML
25 INJECTION, SOLUTION INTRAMUSCULAR; INTRAVENOUS EVERY 5 MIN PRN
Status: DISCONTINUED | OUTPATIENT
Start: 2024-07-29 | End: 2024-07-29 | Stop reason: HOSPADM

## 2024-07-29 RX ORDER — SODIUM CHLORIDE, SODIUM LACTATE, POTASSIUM CHLORIDE, CALCIUM CHLORIDE 600; 310; 30; 20 MG/100ML; MG/100ML; MG/100ML; MG/100ML
INJECTION, SOLUTION INTRAVENOUS CONTINUOUS
Status: DISCONTINUED | OUTPATIENT
Start: 2024-07-29 | End: 2024-07-29 | Stop reason: HOSPADM

## 2024-07-29 RX ORDER — HEPARIN SODIUM 1000 [USP'U]/ML
INJECTION, SOLUTION INTRAVENOUS; SUBCUTANEOUS PRN
Status: DISCONTINUED | OUTPATIENT
Start: 2024-07-29 | End: 2024-07-29 | Stop reason: SDUPTHER

## 2024-07-29 RX ORDER — ACETAMINOPHEN 500 MG
1000 TABLET ORAL ONCE
Status: COMPLETED | OUTPATIENT
Start: 2024-07-29 | End: 2024-07-29

## 2024-07-29 RX ORDER — IBUPROFEN 600 MG/1
1 TABLET ORAL PRN
Status: DISCONTINUED | OUTPATIENT
Start: 2024-07-29 | End: 2024-07-29 | Stop reason: HOSPADM

## 2024-07-29 RX ORDER — PAPAVERINE HYDROCHLORIDE 30 MG/ML
INJECTION INTRAMUSCULAR; INTRAVENOUS PRN
Status: DISCONTINUED | OUTPATIENT
Start: 2024-07-29 | End: 2024-07-29 | Stop reason: ALTCHOICE

## 2024-07-29 RX ORDER — SODIUM CHLORIDE 0.9 % (FLUSH) 0.9 %
5-40 SYRINGE (ML) INJECTION EVERY 12 HOURS SCHEDULED
Status: DISCONTINUED | OUTPATIENT
Start: 2024-07-29 | End: 2024-07-29 | Stop reason: HOSPADM

## 2024-07-29 RX ORDER — PROCHLORPERAZINE EDISYLATE 5 MG/ML
5 INJECTION INTRAMUSCULAR; INTRAVENOUS
Status: DISCONTINUED | OUTPATIENT
Start: 2024-07-29 | End: 2024-07-29 | Stop reason: HOSPADM

## 2024-07-29 RX ORDER — HEPARIN SODIUM 5000 [USP'U]/ML
INJECTION, SOLUTION INTRAVENOUS; SUBCUTANEOUS PRN
Status: DISCONTINUED | OUTPATIENT
Start: 2024-07-29 | End: 2024-07-29 | Stop reason: ALTCHOICE

## 2024-07-29 RX ADMIN — ONDANSETRON 4 MG: 2 INJECTION INTRAMUSCULAR; INTRAVENOUS at 11:55

## 2024-07-29 RX ADMIN — FENTANYL CITRATE 50 MCG: 50 INJECTION, SOLUTION INTRAMUSCULAR; INTRAVENOUS at 12:42

## 2024-07-29 RX ADMIN — HEPARIN SODIUM 6000 UNITS: 1000 INJECTION, SOLUTION INTRAVENOUS; SUBCUTANEOUS at 12:18

## 2024-07-29 RX ADMIN — Medication 140 MG: at 11:46

## 2024-07-29 RX ADMIN — FENTANYL CITRATE 25 MCG: 50 INJECTION, SOLUTION INTRAMUSCULAR; INTRAVENOUS at 12:02

## 2024-07-29 RX ADMIN — PROPOFOL 200 MG: 10 INJECTION, EMULSION INTRAVENOUS at 11:46

## 2024-07-29 RX ADMIN — LIDOCAINE HYDROCHLORIDE 60 MG: 20 INJECTION, SOLUTION EPIDURAL; INFILTRATION; INTRACAUDAL; PERINEURAL at 11:46

## 2024-07-29 RX ADMIN — SODIUM CHLORIDE, SODIUM LACTATE, POTASSIUM CHLORIDE, AND CALCIUM CHLORIDE: 600; 310; 30; 20 INJECTION, SOLUTION INTRAVENOUS at 11:39

## 2024-07-29 RX ADMIN — FENTANYL CITRATE 50 MCG: 50 INJECTION, SOLUTION INTRAMUSCULAR; INTRAVENOUS at 12:09

## 2024-07-29 RX ADMIN — ACETAMINOPHEN 1000 MG: 500 TABLET, FILM COATED ORAL at 09:41

## 2024-07-29 RX ADMIN — OXYCODONE 5 MG: 5 TABLET ORAL at 13:37

## 2024-07-29 RX ADMIN — FENTANYL CITRATE 50 MCG: 50 INJECTION, SOLUTION INTRAMUSCULAR; INTRAVENOUS at 12:21

## 2024-07-29 RX ADMIN — Medication 2000 MG: at 11:55

## 2024-07-29 RX ADMIN — FENTANYL CITRATE 25 MCG: 50 INJECTION, SOLUTION INTRAMUSCULAR; INTRAVENOUS at 11:57

## 2024-07-29 ASSESSMENT — PAIN - FUNCTIONAL ASSESSMENT
PAIN_FUNCTIONAL_ASSESSMENT: 0-10
PAIN_FUNCTIONAL_ASSESSMENT: NONE - DENIES PAIN
PAIN_FUNCTIONAL_ASSESSMENT: 0-10
PAIN_FUNCTIONAL_ASSESSMENT: 0-10

## 2024-07-29 ASSESSMENT — PAIN DESCRIPTION - LOCATION: LOCATION: INCISION

## 2024-07-29 ASSESSMENT — PAIN SCALES - GENERAL: PAINLEVEL_OUTOF10: 8

## 2024-07-29 NOTE — ANESTHESIA PRE PROCEDURE
reviewed and Nursing notes reviewed  Airway: Mallampati: II  TM distance: >3 FB   Neck ROM: full  Mouth opening: > = 3 FB   Dental:    (+) partials      Pulmonary:Negative Pulmonary ROS and normal exam                               Cardiovascular:  Exercise tolerance: good (>4 METS)  (+) hypertension:, hyperlipidemia      ECG reviewed  Rhythm: regular  Rate: normal                 ROS comment: EKG NSR     Neuro/Psych:   (+) neuromuscular disease:             ROS comment: fibromyalgia GI/Hepatic/Renal:   (+) GERD:, renal disease (on HD >1 month, T/T/S schedule): ESRD, morbid obesity         ROS comment: K 4.0 today.   Endo/Other:    (+) : arthritis: OA..                  ROS comment: Impaired fasting glucose    Anemia   Abdominal: normal exam            Vascular: negative vascular ROS.         Other Findings:             Anesthesia Plan      general     ASA 3     (General, ETT.  Offered Peripheral Nerve block, TIVA ansthesia, patient preferred to have a GETA)  Induction: intravenous.    MIPS: Postoperative opioids intended and Prophylactic antiemetics administered.  Anesthetic plan and risks discussed with patient.      Plan discussed with CRNA.                Na 137, K 4.0  Hgb 10.5    EKG NSR    Alma Delia Castro MD   7/29/2024

## 2024-07-29 NOTE — ANESTHESIA POSTPROCEDURE EVALUATION
Department of Anesthesiology  Postprocedure Note    Patient: Triny Ruvalcaba  MRN: [id number]  YOB: 1945  Date of evaluation: 7/29/2024    Procedure Summary       Date: 07/29/24 Room / Location: [facility] MAIN OR 09 / [facility] MAIN OR    Anesthesia Start: 1139 Anesthesia Stop: 1318    Procedure: LEFT AREM ARTERIOVENOUS FISTULA CREATION (Left: Arm Lower) Diagnosis:       ESRD (end stage renal disease) (HCC)      (ESRD (end stage renal disease) (HCC) [N18.6])    Providers: Harrison Germain MD Responsible Provider: Alma Delia Castro MD    Anesthesia Type: general ASA Status: 3            Anesthesia Type: No value filed.    Eldon Phase I: Eldon Score: 8    Eldon Phase II:      Anesthesia Post Evaluation    Patient location during evaluation: PACU  Patient participation: complete - patient participated  Level of consciousness: awake and alert  Airway patency: patent  Nausea & Vomiting: no nausea and no vomiting  Cardiovascular status: hemodynamically stable  Respiratory status: acceptable  Hydration status: euvolemic  Multimodal analgesia pain management approach  Pain management: adequate    No notable events documented.

## 2024-07-29 NOTE — BRIEF OP NOTE
[address] [address]  [phone] FAX: [phone]    Brief Op Note Template Note    Pre-Op Diagnosis: ESRD (end stage renal disease) (HCC) [N18.6]    Post-Op Diagnosis:  * No post-op diagnosis entered *    Procedures: Procedure(s):  LEFT AREM ARTERIOVENOUS FISTULA CREATION    Surgeon: HARRISON GERMAIN MD    Assistants: Surgeon(s):  Harrison Germain MD      Anesthesia:  General     Findings: Left radiocephalic fistula    Tourniquet Time:  * No tourniquets in log *    Estimated Blood Loss:               Specimens:             Implants:  * No implants in log *    Complications: None               Signed: HARRISON GERMAIN MD      Elements of this note have been dictated using speech recognition software. As a result, errors of speech recognition may have occurred.

## 2024-07-29 NOTE — DISCHARGE INSTRUCTIONS
INSTRUCTIONS FOR A-V FISTULA, GRAFT ACCESS, REVISION OR DECLOT    ACTIVITY  As tolerated and as directed by your doctor.   Keep arm straight and raised above heart level for the next 24 hours.    DIET  Clear liquids until no nausea or vomiting; then light diet for the first day.  Advance to regular diet on second day, unless your doctor orders otherwise.  If nausea and vomiting continues, call your doctor.     PAIN  Take pain medication as directed by your doctor.  Call your doctor if pain is NOT relieved by the medication.  DO NOT take aspirin or blood thinners until directed by your doctor.    DRESSING CARE  Keep your surgical site clean and dry      CARE OF YOUR ACCESS  DO:  Keep access area clean with soap and water daily. Okay to shower tomorrow. No tub bathing, hot tubs, or swimming until after follow up appointment.  Feel for the thrill daily. (by placing your fingers over the graft you will be able to feel a vibration (thrill) which means blood is flowing and the graft is working.)  DO NOT:  Wear tight sleeves, watches, belts or bracelets over graft.  Carry heavy bags across the graft.  Sleep on graft side.  Let your blood pressure be taken on graft side.  Let blood be drawn from your graft side.    CALL YOUR DOCTOR IF  Excessive bleeding that does not stop after holding mild pressure over area.  Temperature of 101 degrees F or above.  Redness, excessive swelling or bruising, and/or green or yellow, smelly discharge from incision   Loss of sensation-cold, white, or blue fingers or toes.    After general anesthesia or intravenous sedation, for 24 hours or while taking prescription Narcotics:  Limit your activities  A responsible adult needs to be with you for the next 24 hours  Do not drive and operate hazardous machinery  Do not make important personal or business decisions  Do not drink alcoholic beverages  If you have not urinated within 8 hours after discharge, and you are experiencing discomfort from

## 2024-07-29 NOTE — OP NOTE
[address]  [address]                            OPERATIVE REPORT      PATIENT NAME: YEYO IBARRA             : 1945  MED REC NO: [id number]                       ROOM: [facility] NO: [id number]                       ADMIT DATE: 2024  PROVIDER: Harrison Germain MD    DATE OF SERVICE:  2024    PREOPERATIVE DIAGNOSES:  End-stage renal disease, needing long-term dialysis access.    POSTOPERATIVE DIAGNOSES:  End-stage renal disease, needing long-term dialysis access.    PROCEDURES PERFORMED:  Creation of left brachiocephalic fistula.    SURGEON:  Harrison Germain MD    ASSISTANT:      ANESTHESIA:  General.    ESTIMATED BLOOD LOSS:      SPECIMENS REMOVED:  None.    INTRAOPERATIVE FINDINGS:       COMPLICATIONS:  None.    IMPLANTS:      INDICATIONS:      DESCRIPTION OF PROCEDURE:  After getting informed consent, the patient was brought into the operating room, anesthesia was then induced.  Preop antibiotics were given before skin incision.  The patient's left arm was then prepped and draped in normal sterile fashion.  A vertical incision was made above the antecubital fossa.  We dissected down to the subcutaneous tissue where the cephalic vein was identified.  It was measuring about 4 to 5 mm.  We got proximal and distal control.  It was ligated distally and suture ligated with 2-0 silk pop-off.  We then dissected down to the fascia where we identified the brachial artery, it was measuring about 4 to 5 mm.  We got proximal and distal control.  We gave 6000 of heparin.  With #11 blade, we did arteriotomy.  We extended with Che scissors.  We then spatulated the vein in end-to-side anastomosis using 6-0 Prolene.  Postprocedure, we had good Doppler thrill and good signal in the distal brachial.  We held pressure for 5 minutes and closed with 2-0 Vicryl, 3-0 Vicryl, and 4-0 Monocryl.  The patient was extubated and taken to

## 2024-08-07 NOTE — PROGRESS NOTES
poDate: 2024      Name: Triny Ruvalcaba      MRN: [id number]       : 1945       Age: 78 y.o.    Sex: female        Brothers, Arias BANSAL, DO       CC:  No chief complaint on file.      HPI:  The patient presents for a post-op visit s/p open VHR done on 24. The patient developed a prolonged ileus after surgery and her CKD V went into ESRD. She was started on HD while in the hospital. Her discharge summary was as follows:    Admit date: 2024     Discharge date: 2024  Attending Physician: Harrison Crain MD  Primary Discharge Diagnosis:   Principal Problem (Resolved):    Ventral hernia without obstruction or gangrene  Active Problems:    Morbid obesity (HCC)    CKD (chronic kidney disease) stage V requiring chronic dialysis (HCC)     Primary Operations or Procedures Performed :  Procedure(s):  OPEN VENTRAL HERNIA REPAIR W/ MESH 24.     Brief History and Reason for Admission: Triny Ruvalcaba was admitted with the following history of present illness: Triny Ruvalcaba is a 78 y.o. female who has signs and symptoms consistent with reducible ventral hernia.          Hospital Course:    The patient underwent Procedure(s):  OPEN VENTRAL HERNIA REPAIR W/ MESH on 24 by Dr Crain.      Post operatively we noted the patients CKD 4 had worsened and she also had Hyperkalemia that we treated with lokelma.  We consulted Nephrology 24.  Her CKD was advanced and now at Stage V.  She had been last seen in May 2024 by Nephrology as OP with plans to medically manage her electrolyte derangements. Nephrology followed her and was offering dialysis for her Cr that had reached 10.4 24.  She also developed continuous nausea with vomiting with inability to advance her diet and a post operative Ileus confirmed on CT A/P 24.  She also had Leukocytosis that peaked 24 at 18.8- as above CT A/P showed no evidence of post op fluid collection or infection, only the post op ileus.      She consented

## 2024-08-10 DIAGNOSIS — N18.6 ESRD (END STAGE RENAL DISEASE) (HCC): ICD-10-CM

## 2024-08-12 ENCOUNTER — OFFICE VISIT (OUTPATIENT)
Dept: SURGERY | Age: 79
End: 2024-08-12

## 2024-08-12 VITALS
HEIGHT: 65 IN | SYSTOLIC BLOOD PRESSURE: 127 MMHG | HEART RATE: 88 BPM | BODY MASS INDEX: 38.99 KG/M2 | WEIGHT: 234 LBS | DIASTOLIC BLOOD PRESSURE: 64 MMHG

## 2024-08-12 DIAGNOSIS — K43.9 VENTRAL HERNIA WITHOUT OBSTRUCTION OR GANGRENE: Primary | ICD-10-CM

## 2024-08-12 DIAGNOSIS — E66.01 MORBID OBESITY (HCC): ICD-10-CM

## 2024-08-12 PROCEDURE — 99024 POSTOP FOLLOW-UP VISIT: CPT | Performed by: SURGERY

## 2024-08-12 RX ORDER — LEVOCETIRIZINE DIHYDROCHLORIDE 5 MG/1
5 TABLET, FILM COATED ORAL DAILY
Qty: 90 TABLET | Refills: 1 | OUTPATIENT
Start: 2024-08-12

## 2024-08-12 RX ORDER — SODIUM BICARBONATE 650 MG/1
650 TABLET ORAL 2 TIMES DAILY
Qty: 180 TABLET | Refills: 1 | OUTPATIENT
Start: 2024-08-12

## 2024-08-16 ENCOUNTER — OFFICE VISIT (OUTPATIENT)
Dept: VASCULAR SURGERY | Age: 79
End: 2024-08-16
Payer: MEDICARE

## 2024-08-16 VITALS
DIASTOLIC BLOOD PRESSURE: 80 MMHG | WEIGHT: 232 LBS | OXYGEN SATURATION: 89 % | HEIGHT: 65 IN | SYSTOLIC BLOOD PRESSURE: 163 MMHG | HEART RATE: 96 BPM | BODY MASS INDEX: 38.65 KG/M2

## 2024-08-16 DIAGNOSIS — I73.9 PVD (PERIPHERAL VASCULAR DISEASE) WITH CLAUDICATION (HCC): Primary | ICD-10-CM

## 2024-08-16 PROCEDURE — G8399 PT W/DXA RESULTS DOCUMENT: HCPCS | Performed by: SURGERY

## 2024-08-16 PROCEDURE — 1123F ACP DISCUSS/DSCN MKR DOCD: CPT | Performed by: SURGERY

## 2024-08-16 PROCEDURE — 1090F PRES/ABSN URINE INCON ASSESS: CPT | Performed by: SURGERY

## 2024-08-16 PROCEDURE — G8427 DOCREV CUR MEDS BY ELIG CLIN: HCPCS | Performed by: SURGERY

## 2024-08-16 PROCEDURE — 1036F TOBACCO NON-USER: CPT | Performed by: SURGERY

## 2024-08-16 PROCEDURE — 99213 OFFICE O/P EST LOW 20 MIN: CPT | Performed by: SURGERY

## 2024-08-16 PROCEDURE — G8417 CALC BMI ABV UP PARAM F/U: HCPCS | Performed by: SURGERY

## 2024-08-16 NOTE — PROGRESS NOTES
[address] [place] [address]  [phone] FAX: [phone]    Triny Ruvalcaba  : 1945    Chief Complaint:     History of Present Illness:   Patient follows up today for status post creation of left brachiocephalic fistula.  Patient currently on dialysis right chest tunneled catheter.    CURRENT MEDICATIONS:  Current Outpatient Medications   Medication Sig Dispense Refill    calcium acetate (PHOSLO) 667 MG TABS Take 1 tablet by mouth 3 times daily 180 tablet 1    amLODIPine (NORVASC) 10 MG tablet Take 1 tablet by mouth daily 90 tablet 1    fluticasone (FLONASE) 50 MCG/ACT nasal spray 1 spray by Nasal route at bedtime Instill 2 sprays into each nostril once daily      olopatadine (PATANOL) 0.1 % ophthalmic solution Apply 2 drops to eye 2 times daily as needed for Allergies      allopurinol (ZYLOPRIM) 100 MG tablet Take 1 tablet by mouth daily (Patient not taking: Reported on 2024) 30 tablet 0    polyethylene glycol (GLYCOLAX) 17 g packet Take 1 packet by mouth daily as needed for Constipation (constipation) (Patient not taking: Reported on 2024) 90 Device 0    levocetirizine (XYZAL) 5 MG tablet Take 1 tablet by mouth daily (Patient not taking: Reported on 2024) 90 tablet 1    colestipol (COLESTID) 1 g tablet Take 1 tablet by mouth 2 times daily (Patient not taking: Reported on 2024) 180 tablet 1     No current facility-administered medications for this visit.       Past Medical History:   Diagnosis Date    Arthritis     generalized    Chronic kidney disease, stage IV (severe) ([facility])     Chronic low back pain with bilateral sciatica 10/9/2016    Cigarette smoker     DDD (degenerative disc disease), lumbar 2017    Fibromyalgia     GERD (gastroesophageal reflux disease)     controlled w meds    History of small bowel obstruction 2016    Hyperkalemia     Hypertension     medicated    Hypokalemia     Iron deficiency anemia     Irritable bowel

## 2024-08-28 ENCOUNTER — TELEPHONE (OUTPATIENT)
Dept: SURGERY | Age: 79
End: 2024-08-28

## 2024-08-28 PROBLEM — Z01.818 PRE-OP TESTING: Status: RESOLVED | Noted: 2024-07-29 | Resolved: 2024-08-28

## 2024-08-28 NOTE — TELEPHONE ENCOUNTER
Received a call from Kristen with [facility] calling to notify us that patient is being discharged from home health today. Her wound is healed.

## 2024-09-09 ENCOUNTER — OFFICE VISIT (OUTPATIENT)
Dept: SURGERY | Age: 79
End: 2024-09-09

## 2024-09-09 VITALS
HEART RATE: 92 BPM | SYSTOLIC BLOOD PRESSURE: 150 MMHG | BODY MASS INDEX: 39.47 KG/M2 | DIASTOLIC BLOOD PRESSURE: 78 MMHG | WEIGHT: 237.2 LBS

## 2024-09-09 DIAGNOSIS — K43.9 VENTRAL HERNIA WITHOUT OBSTRUCTION OR GANGRENE: Primary | ICD-10-CM

## 2024-09-09 DIAGNOSIS — E66.01 MORBID OBESITY (HCC): ICD-10-CM

## 2024-09-09 PROCEDURE — 99024 POSTOP FOLLOW-UP VISIT: CPT | Performed by: SURGERY

## 2024-09-20 ENCOUNTER — OFFICE VISIT (OUTPATIENT)
Dept: VASCULAR SURGERY | Age: 79
End: 2024-09-20
Payer: MEDICARE

## 2024-09-20 ENCOUNTER — PREP FOR PROCEDURE (OUTPATIENT)
Dept: VASCULAR SURGERY | Age: 79
End: 2024-09-20

## 2024-09-20 VITALS
DIASTOLIC BLOOD PRESSURE: 76 MMHG | BODY MASS INDEX: 38.99 KG/M2 | WEIGHT: 234 LBS | HEART RATE: 85 BPM | OXYGEN SATURATION: 96 % | SYSTOLIC BLOOD PRESSURE: 157 MMHG | HEIGHT: 65 IN

## 2024-09-20 DIAGNOSIS — I73.9 PVD (PERIPHERAL VASCULAR DISEASE) (HCC): ICD-10-CM

## 2024-09-20 DIAGNOSIS — I73.9 PVD (PERIPHERAL VASCULAR DISEASE) WITH CLAUDICATION (HCC): Primary | ICD-10-CM

## 2024-09-20 PROBLEM — N18.9 CHRONIC KIDNEY DISEASE (CKD): Status: ACTIVE | Noted: 2024-09-20

## 2024-09-20 PROCEDURE — G8427 DOCREV CUR MEDS BY ELIG CLIN: HCPCS | Performed by: SURGERY

## 2024-09-20 PROCEDURE — 1123F ACP DISCUSS/DSCN MKR DOCD: CPT | Performed by: SURGERY

## 2024-09-20 PROCEDURE — G8417 CALC BMI ABV UP PARAM F/U: HCPCS | Performed by: SURGERY

## 2024-09-20 PROCEDURE — 1036F TOBACCO NON-USER: CPT | Performed by: SURGERY

## 2024-09-20 PROCEDURE — G8399 PT W/DXA RESULTS DOCUMENT: HCPCS | Performed by: SURGERY

## 2024-09-20 PROCEDURE — 99213 OFFICE O/P EST LOW 20 MIN: CPT | Performed by: SURGERY

## 2024-09-20 PROCEDURE — 1090F PRES/ABSN URINE INCON ASSESS: CPT | Performed by: SURGERY

## 2024-09-20 RX ORDER — DIPHENHYDRAMINE HCL 50 MG
50 CAPSULE ORAL EVERY 6 HOURS PRN
COMMUNITY

## 2024-09-20 RX ORDER — ACETAMINOPHEN 500 MG
500 TABLET ORAL EVERY 6 HOURS PRN
COMMUNITY

## 2024-09-24 ENCOUNTER — ANESTHESIA EVENT (OUTPATIENT)
Dept: SURGERY | Age: 79
End: 2024-09-24
Payer: MEDICARE

## 2024-09-25 ENCOUNTER — APPOINTMENT (OUTPATIENT)
Dept: INTERVENTIONAL RADIOLOGY/VASCULAR | Age: 79
End: 2024-09-25
Attending: SURGERY
Payer: MEDICARE

## 2024-09-25 ENCOUNTER — HOSPITAL ENCOUNTER (OUTPATIENT)
Age: 79
Setting detail: OUTPATIENT SURGERY
Discharge: HOME | End: 2024-09-25
Attending: SURGERY | Admitting: SURGERY
Payer: MEDICARE

## 2024-09-25 ENCOUNTER — ANESTHESIA (OUTPATIENT)
Dept: SURGERY | Age: 79
End: 2024-09-25
Payer: MEDICARE

## 2024-09-25 VITALS
HEART RATE: 79 BPM | DIASTOLIC BLOOD PRESSURE: 61 MMHG | RESPIRATION RATE: 13 BRPM | WEIGHT: 235 LBS | BODY MASS INDEX: 39.15 KG/M2 | OXYGEN SATURATION: 97 % | HEIGHT: 65 IN | SYSTOLIC BLOOD PRESSURE: 137 MMHG | TEMPERATURE: 97.5 F

## 2024-09-25 DIAGNOSIS — N18.9 CHRONIC KIDNEY DISEASE, UNSPECIFIED CKD STAGE: Primary | ICD-10-CM

## 2024-09-25 LAB
HGB BLD-MCNC: 13 G/DL (ref 11.7–15.4)
POTASSIUM BLD-SCNC: 4.2 MMOL/L (ref 3.5–5.1)

## 2024-09-25 PROCEDURE — 6370000000 HC RX 637 (ALT 250 FOR IP): Performed by: ANESTHESIOLOGY

## 2024-09-25 PROCEDURE — 7100000010 HC PHASE II RECOVERY - FIRST 15 MIN: Performed by: SURGERY

## 2024-09-25 PROCEDURE — 2500000003 HC RX 250 WO HCPCS: Performed by: SURGERY

## 2024-09-25 PROCEDURE — 6360000004 HC RX CONTRAST MEDICATION: Performed by: SURGERY

## 2024-09-25 PROCEDURE — 6360000002 HC RX W HCPCS: Performed by: ANESTHESIOLOGY

## 2024-09-25 PROCEDURE — 3700000000 HC ANESTHESIA ATTENDED CARE: Performed by: SURGERY

## 2024-09-25 PROCEDURE — 7100000011 HC PHASE II RECOVERY - ADDTL 15 MIN: Performed by: SURGERY

## 2024-09-25 PROCEDURE — 3700000001 HC ADD 15 MINUTES (ANESTHESIA): Performed by: SURGERY

## 2024-09-25 PROCEDURE — 3600000003 HC SURGERY LEVEL 3 BASE: Performed by: SURGERY

## 2024-09-25 PROCEDURE — 2580000003 HC RX 258: Performed by: ANESTHESIOLOGY

## 2024-09-25 PROCEDURE — 84132 ASSAY OF SERUM POTASSIUM: CPT

## 2024-09-25 PROCEDURE — 2709999900 HC NON-CHARGEABLE SUPPLY: Performed by: SURGERY

## 2024-09-25 PROCEDURE — 6360000002 HC RX W HCPCS

## 2024-09-25 PROCEDURE — 7100000001 HC PACU RECOVERY - ADDTL 15 MIN: Performed by: SURGERY

## 2024-09-25 PROCEDURE — 3600000013 HC SURGERY LEVEL 3 ADDTL 15MIN: Performed by: SURGERY

## 2024-09-25 PROCEDURE — 85018 HEMOGLOBIN: CPT

## 2024-09-25 PROCEDURE — 6360000002 HC RX W HCPCS: Performed by: SURGERY

## 2024-09-25 PROCEDURE — 76000 FLUOROSCOPY <1 HR PHYS/QHP: CPT

## 2024-09-25 PROCEDURE — 7100000000 HC PACU RECOVERY - FIRST 15 MIN: Performed by: SURGERY

## 2024-09-25 PROCEDURE — C1769 GUIDE WIRE: HCPCS | Performed by: SURGERY

## 2024-09-25 PROCEDURE — 2500000003 HC RX 250 WO HCPCS

## 2024-09-25 RX ORDER — HEPARIN SODIUM 200 [USP'U]/100ML
INJECTION, SOLUTION INTRAVENOUS CONTINUOUS PRN
Status: COMPLETED | OUTPATIENT
Start: 2024-09-25 | End: 2024-09-25

## 2024-09-25 RX ORDER — PHENYLEPHRINE HYDROCHLORIDE 10 MG/ML
INJECTION INTRAVENOUS
Status: DISCONTINUED | OUTPATIENT
Start: 2024-09-25 | End: 2024-09-25 | Stop reason: SDUPTHER

## 2024-09-25 RX ORDER — BUPIVACAINE HYDROCHLORIDE 2.5 MG/ML
INJECTION, SOLUTION EPIDURAL; INFILTRATION; INTRACAUDAL PRN
Status: DISCONTINUED | OUTPATIENT
Start: 2024-09-25 | End: 2024-09-25 | Stop reason: ALTCHOICE

## 2024-09-25 RX ORDER — SODIUM CHLORIDE, SODIUM LACTATE, POTASSIUM CHLORIDE, CALCIUM CHLORIDE 600; 310; 30; 20 MG/100ML; MG/100ML; MG/100ML; MG/100ML
INJECTION, SOLUTION INTRAVENOUS CONTINUOUS
Status: DISCONTINUED | OUTPATIENT
Start: 2024-09-25 | End: 2024-09-25 | Stop reason: HOSPADM

## 2024-09-25 RX ORDER — MIDAZOLAM HYDROCHLORIDE 2 MG/2ML
2 INJECTION, SOLUTION INTRAMUSCULAR; INTRAVENOUS
Status: DISCONTINUED | OUTPATIENT
Start: 2024-09-25 | End: 2024-09-25 | Stop reason: HOSPADM

## 2024-09-25 RX ORDER — FENTANYL CITRATE 50 UG/ML
INJECTION, SOLUTION INTRAMUSCULAR; INTRAVENOUS
Status: DISCONTINUED | OUTPATIENT
Start: 2024-09-25 | End: 2024-09-25 | Stop reason: SDUPTHER

## 2024-09-25 RX ORDER — DIPHENHYDRAMINE HYDROCHLORIDE 50 MG/ML
12.5 INJECTION INTRAMUSCULAR; INTRAVENOUS
Status: DISCONTINUED | OUTPATIENT
Start: 2024-09-25 | End: 2024-09-25 | Stop reason: HOSPADM

## 2024-09-25 RX ORDER — ONDANSETRON 2 MG/ML
INJECTION INTRAMUSCULAR; INTRAVENOUS
Status: DISCONTINUED | OUTPATIENT
Start: 2024-09-25 | End: 2024-09-25 | Stop reason: SDUPTHER

## 2024-09-25 RX ORDER — PROCHLORPERAZINE EDISYLATE 5 MG/ML
5 INJECTION INTRAMUSCULAR; INTRAVENOUS
Status: DISCONTINUED | OUTPATIENT
Start: 2024-09-25 | End: 2024-09-25 | Stop reason: HOSPADM

## 2024-09-25 RX ORDER — OXYCODONE HYDROCHLORIDE 5 MG/1
10 TABLET ORAL PRN
Status: COMPLETED | OUTPATIENT
Start: 2024-09-25 | End: 2024-09-25

## 2024-09-25 RX ORDER — SODIUM CHLORIDE 0.9 % (FLUSH) 0.9 %
5-40 SYRINGE (ML) INJECTION EVERY 12 HOURS SCHEDULED
Status: DISCONTINUED | OUTPATIENT
Start: 2024-09-25 | End: 2024-09-25 | Stop reason: HOSPADM

## 2024-09-25 RX ORDER — HEPARIN SODIUM 1000 [USP'U]/ML
INJECTION, SOLUTION INTRAVENOUS; SUBCUTANEOUS
Status: DISCONTINUED | OUTPATIENT
Start: 2024-09-25 | End: 2024-09-25 | Stop reason: SDUPTHER

## 2024-09-25 RX ORDER — SODIUM CHLORIDE 9 MG/ML
INJECTION, SOLUTION INTRAVENOUS PRN
Status: DISCONTINUED | OUTPATIENT
Start: 2024-09-25 | End: 2024-09-25 | Stop reason: HOSPADM

## 2024-09-25 RX ORDER — HYDROMORPHONE HYDROCHLORIDE 2 MG/ML
0.5 INJECTION, SOLUTION INTRAMUSCULAR; INTRAVENOUS; SUBCUTANEOUS EVERY 5 MIN PRN
Status: COMPLETED | OUTPATIENT
Start: 2024-09-25 | End: 2024-09-25

## 2024-09-25 RX ORDER — LIDOCAINE HYDROCHLORIDE 10 MG/ML
1 INJECTION, SOLUTION INFILTRATION; PERINEURAL
Status: DISCONTINUED | OUTPATIENT
Start: 2024-09-25 | End: 2024-09-25 | Stop reason: HOSPADM

## 2024-09-25 RX ORDER — IOPAMIDOL 612 MG/ML
INJECTION, SOLUTION INTRAVASCULAR PRN
Status: DISCONTINUED | OUTPATIENT
Start: 2024-09-25 | End: 2024-09-25 | Stop reason: ALTCHOICE

## 2024-09-25 RX ORDER — NALOXONE HYDROCHLORIDE 0.4 MG/ML
INJECTION, SOLUTION INTRAMUSCULAR; INTRAVENOUS; SUBCUTANEOUS PRN
Status: DISCONTINUED | OUTPATIENT
Start: 2024-09-25 | End: 2024-09-25 | Stop reason: HOSPADM

## 2024-09-25 RX ORDER — LIDOCAINE HYDROCHLORIDE 20 MG/ML
INJECTION, SOLUTION EPIDURAL; INFILTRATION; INTRACAUDAL; PERINEURAL
Status: DISCONTINUED | OUTPATIENT
Start: 2024-09-25 | End: 2024-09-25 | Stop reason: SDUPTHER

## 2024-09-25 RX ORDER — ROCURONIUM BROMIDE 10 MG/ML
INJECTION, SOLUTION INTRAVENOUS
Status: DISCONTINUED | OUTPATIENT
Start: 2024-09-25 | End: 2024-09-25 | Stop reason: SDUPTHER

## 2024-09-25 RX ORDER — PROPOFOL 10 MG/ML
INJECTION, EMULSION INTRAVENOUS
Status: DISCONTINUED | OUTPATIENT
Start: 2024-09-25 | End: 2024-09-25 | Stop reason: SDUPTHER

## 2024-09-25 RX ORDER — SODIUM CHLORIDE 0.9 % (FLUSH) 0.9 %
5-40 SYRINGE (ML) INJECTION PRN
Status: DISCONTINUED | OUTPATIENT
Start: 2024-09-25 | End: 2024-09-25 | Stop reason: HOSPADM

## 2024-09-25 RX ORDER — DEXAMETHASONE SODIUM PHOSPHATE 4 MG/ML
INJECTION, SOLUTION INTRA-ARTICULAR; INTRALESIONAL; INTRAMUSCULAR; INTRAVENOUS; SOFT TISSUE
Status: DISCONTINUED | OUTPATIENT
Start: 2024-09-25 | End: 2024-09-25 | Stop reason: SDUPTHER

## 2024-09-25 RX ORDER — ACETAMINOPHEN 500 MG
1000 TABLET ORAL ONCE
Status: COMPLETED | OUTPATIENT
Start: 2024-09-25 | End: 2024-09-25

## 2024-09-25 RX ORDER — HYDROMORPHONE HYDROCHLORIDE 2 MG/ML
0.5 INJECTION, SOLUTION INTRAMUSCULAR; INTRAVENOUS; SUBCUTANEOUS EVERY 5 MIN PRN
Status: DISCONTINUED | OUTPATIENT
Start: 2024-09-25 | End: 2024-09-25 | Stop reason: HOSPADM

## 2024-09-25 RX ORDER — LIDOCAINE HYDROCHLORIDE 10 MG/ML
INJECTION, SOLUTION INFILTRATION; PERINEURAL PRN
Status: DISCONTINUED | OUTPATIENT
Start: 2024-09-25 | End: 2024-09-25 | Stop reason: ALTCHOICE

## 2024-09-25 RX ORDER — OXYCODONE HYDROCHLORIDE 5 MG/1
5 TABLET ORAL PRN
Status: COMPLETED | OUTPATIENT
Start: 2024-09-25 | End: 2024-09-25

## 2024-09-25 RX ORDER — HEPARIN SODIUM 5000 [USP'U]/ML
INJECTION, SOLUTION INTRAVENOUS; SUBCUTANEOUS PRN
Status: DISCONTINUED | OUTPATIENT
Start: 2024-09-25 | End: 2024-09-25 | Stop reason: ALTCHOICE

## 2024-09-25 RX ORDER — ONDANSETRON 2 MG/ML
4 INJECTION INTRAMUSCULAR; INTRAVENOUS
Status: DISCONTINUED | OUTPATIENT
Start: 2024-09-25 | End: 2024-09-25 | Stop reason: HOSPADM

## 2024-09-25 RX ORDER — OXYCODONE AND ACETAMINOPHEN 5; 325 MG/1; MG/1
1 TABLET ORAL EVERY 4 HOURS PRN
Qty: 30 TABLET | Refills: 0 | Status: SHIPPED | OUTPATIENT
Start: 2024-09-25 | End: 2024-09-30

## 2024-09-25 RX ADMIN — PHENYLEPHRINE HYDROCHLORIDE 100 MCG: 10 INJECTION INTRAVENOUS at 07:58

## 2024-09-25 RX ADMIN — ROCURONIUM BROMIDE 30 MG: 10 INJECTION, SOLUTION INTRAVENOUS at 07:02

## 2024-09-25 RX ADMIN — HYDROMORPHONE HYDROCHLORIDE 0.5 MG: 2 INJECTION INTRAMUSCULAR; INTRAVENOUS; SUBCUTANEOUS at 08:56

## 2024-09-25 RX ADMIN — HYDROMORPHONE HYDROCHLORIDE 0.5 MG: 2 INJECTION INTRAMUSCULAR; INTRAVENOUS; SUBCUTANEOUS at 09:05

## 2024-09-25 RX ADMIN — ROCURONIUM BROMIDE 20 MG: 10 INJECTION, SOLUTION INTRAVENOUS at 07:25

## 2024-09-25 RX ADMIN — PROPOFOL 50 MG: 10 INJECTION, EMULSION INTRAVENOUS at 07:32

## 2024-09-25 RX ADMIN — OXYCODONE 10 MG: 5 TABLET ORAL at 09:03

## 2024-09-25 RX ADMIN — SUGAMMADEX 200 MG: 100 INJECTION, SOLUTION INTRAVENOUS at 08:32

## 2024-09-25 RX ADMIN — HYDROMORPHONE HYDROCHLORIDE 0.5 MG: 2 INJECTION INTRAMUSCULAR; INTRAVENOUS; SUBCUTANEOUS at 08:51

## 2024-09-25 RX ADMIN — HYDROMORPHONE HYDROCHLORIDE 0.5 MG: 2 INJECTION INTRAMUSCULAR; INTRAVENOUS; SUBCUTANEOUS at 09:26

## 2024-09-25 RX ADMIN — SODIUM CHLORIDE, SODIUM LACTATE, POTASSIUM CHLORIDE, AND CALCIUM CHLORIDE: 600; 310; 30; 20 INJECTION, SOLUTION INTRAVENOUS at 06:57

## 2024-09-25 RX ADMIN — LIDOCAINE HYDROCHLORIDE 60 MG: 20 INJECTION, SOLUTION EPIDURAL; INFILTRATION; INTRACAUDAL; PERINEURAL at 07:02

## 2024-09-25 RX ADMIN — ONDANSETRON 4 MG: 2 INJECTION INTRAMUSCULAR; INTRAVENOUS at 08:19

## 2024-09-25 RX ADMIN — HEPARIN SODIUM 5000 UNITS: 1000 INJECTION INTRAVENOUS; SUBCUTANEOUS at 07:55

## 2024-09-25 RX ADMIN — Medication 2000 MG: at 07:12

## 2024-09-25 RX ADMIN — DEXAMETHASONE SODIUM PHOSPHATE 4 MG: 4 INJECTION INTRA-ARTICULAR; INTRALESIONAL; INTRAMUSCULAR; INTRAVENOUS; SOFT TISSUE at 08:19

## 2024-09-25 RX ADMIN — FENTANYL CITRATE 50 MCG: 50 INJECTION, SOLUTION INTRAMUSCULAR; INTRAVENOUS at 07:02

## 2024-09-25 RX ADMIN — ACETAMINOPHEN 1000 MG: 500 TABLET, FILM COATED ORAL at 06:04

## 2024-09-25 RX ADMIN — PROPOFOL 150 MG: 10 INJECTION, EMULSION INTRAVENOUS at 07:02

## 2024-09-25 RX ADMIN — FENTANYL CITRATE 50 MCG: 50 INJECTION, SOLUTION INTRAMUSCULAR; INTRAVENOUS at 07:16

## 2024-09-25 RX ADMIN — SODIUM CHLORIDE: 9 INJECTION, SOLUTION INTRAVENOUS at 06:04

## 2024-09-25 ASSESSMENT — PAIN SCALES - GENERAL
PAINLEVEL_OUTOF10: 4
PAINLEVEL_OUTOF10: 9
PAINLEVEL_OUTOF10: 8

## 2024-09-25 ASSESSMENT — PAIN - FUNCTIONAL ASSESSMENT: PAIN_FUNCTIONAL_ASSESSMENT: 0-10

## 2024-09-25 ASSESSMENT — LIFESTYLE VARIABLES: SMOKING_STATUS: 0

## 2024-09-25 ASSESSMENT — PAIN DESCRIPTION - LOCATION: LOCATION: INCISION

## 2024-10-11 ENCOUNTER — OFFICE VISIT (OUTPATIENT)
Dept: VASCULAR SURGERY | Age: 79
End: 2024-10-11
Payer: MEDICARE

## 2024-10-11 VITALS
HEART RATE: 84 BPM | BODY MASS INDEX: 38.99 KG/M2 | DIASTOLIC BLOOD PRESSURE: 66 MMHG | SYSTOLIC BLOOD PRESSURE: 116 MMHG | OXYGEN SATURATION: 97 % | HEIGHT: 65 IN | WEIGHT: 234 LBS

## 2024-10-11 DIAGNOSIS — I73.9 PVD (PERIPHERAL VASCULAR DISEASE) WITH CLAUDICATION (HCC): Primary | ICD-10-CM

## 2024-10-11 PROCEDURE — 1090F PRES/ABSN URINE INCON ASSESS: CPT | Performed by: SURGERY

## 2024-10-11 PROCEDURE — G8417 CALC BMI ABV UP PARAM F/U: HCPCS | Performed by: SURGERY

## 2024-10-11 PROCEDURE — G8399 PT W/DXA RESULTS DOCUMENT: HCPCS | Performed by: SURGERY

## 2024-10-11 PROCEDURE — 99213 OFFICE O/P EST LOW 20 MIN: CPT | Performed by: SURGERY

## 2024-10-11 PROCEDURE — G8427 DOCREV CUR MEDS BY ELIG CLIN: HCPCS | Performed by: SURGERY

## 2024-10-11 PROCEDURE — 1123F ACP DISCUSS/DSCN MKR DOCD: CPT | Performed by: SURGERY

## 2024-10-11 PROCEDURE — 1036F TOBACCO NON-USER: CPT | Performed by: SURGERY

## 2024-10-11 PROCEDURE — G8484 FLU IMMUNIZE NO ADMIN: HCPCS | Performed by: SURGERY

## 2024-10-11 NOTE — PROGRESS NOTES
[address] [address]  [phone] FAX: [phone]    Triny Ruvalcaba  : 1945    Chief Complaint:     History of Present Illness:   Patient follows up today for status post revision with elevation and balloon angioplasty and stent of outflow stenosis.  Patient currently dialysis right chest tunneled catheter.    CURRENT MEDICATIONS:  Current Outpatient Medications   Medication Sig Dispense Refill    diphenhydrAMINE (BENADRYL) 50 MG capsule Take 1 capsule by mouth every 6 hours as needed for Itching      acetaminophen (TYLENOL) 500 MG tablet Take 1 tablet by mouth every 6 hours as needed for Pain      colestipol (COLESTID) 1 g tablet Take 1 tablet by mouth 2 times daily 180 tablet 1    amLODIPine (NORVASC) 10 MG tablet Take 1 tablet by mouth daily 90 tablet 1     No current facility-administered medications for this visit.       Past Medical History:   Diagnosis Date    Arthritis     generalized    Chronic low back pain with bilateral sciatica 10/9/2016    Cigarette smoker     DDD (degenerative disc disease), lumbar 2017    ESRD (end stage renal disease) on dialysis ([facility])     onset 2024---T- TH- SAT--  RENAL IN [place]-- followed by dr cagle    Fibromyalgia     GERD (gastroesophageal reflux disease)     hx-- per pt not a current issue no meds    History of small bowel obstruction 2016    Hyperkalemia     Hypertension     controlled with med    Hypokalemia     Iron deficiency anemia     Irritable bowel syndrome with diarrhea 2016    Metabolic acidosis     NaHCO3 BID       Physical Examination:   No data recorded    Constitutional: she appears well-developed. No distress.   HENT:   Head: Atraumatic.   Eyes: Pupils are equal, round, and reactive to light.   Neck: Normal range of motion.   Cardiovascular: Regular rhythm.    Pulmonary/Chest: Effort normal and breath sounds normal. No respiratory distress.   Abdominal: Soft. Bowel sounds are

## 2024-11-18 ENCOUNTER — TELEPHONE (OUTPATIENT)
Dept: VASCULAR SURGERY | Age: 79
End: 2024-11-18

## 2024-11-18 NOTE — TELEPHONE ENCOUNTER
Fax received from Nidia at  [facility] for appt to eval for Hematoma in L-Arm AVF. I faxed back appt. scheduled for  Fri. 11/22/24 at 11:00 - hemodialysis US and see DTW. I called Nidia to be sure the pt will be notified by them, she stated she will notify the pt when she comes in for treatment tomorrow (Tuesday 11/19).

## 2024-11-20 ENCOUNTER — TELEPHONE (OUTPATIENT)
Dept: INTERNAL MEDICINE CLINIC | Facility: CLINIC | Age: 79
End: 2024-11-20

## 2024-11-20 ENCOUNTER — OFFICE VISIT (OUTPATIENT)
Dept: INTERNAL MEDICINE CLINIC | Facility: CLINIC | Age: 79
End: 2024-11-20
Payer: MEDICARE

## 2024-11-20 VITALS
HEART RATE: 95 BPM | SYSTOLIC BLOOD PRESSURE: 132 MMHG | WEIGHT: 237 LBS | OXYGEN SATURATION: 96 % | HEIGHT: 65 IN | BODY MASS INDEX: 39.49 KG/M2 | DIASTOLIC BLOOD PRESSURE: 76 MMHG

## 2024-11-20 DIAGNOSIS — E66.01 MORBID OBESITY: ICD-10-CM

## 2024-11-20 DIAGNOSIS — Z99.2 ESRD (END STAGE RENAL DISEASE) ON DIALYSIS (HCC): Primary | ICD-10-CM

## 2024-11-20 DIAGNOSIS — I12.9 BENIGN HYPERTENSION WITH CHRONIC KIDNEY DISEASE: ICD-10-CM

## 2024-11-20 DIAGNOSIS — R73.01 IFG (IMPAIRED FASTING GLUCOSE): ICD-10-CM

## 2024-11-20 DIAGNOSIS — N18.6 ESRD (END STAGE RENAL DISEASE) ON DIALYSIS (HCC): Primary | ICD-10-CM

## 2024-11-20 DIAGNOSIS — K58.0 IRRITABLE BOWEL SYNDROME WITH DIARRHEA: ICD-10-CM

## 2024-11-20 PROBLEM — R30.0 DIFFICULT OR PAINFUL URINATION: Status: RESOLVED | Noted: 2021-03-13 | Resolved: 2024-11-20

## 2024-11-20 PROBLEM — N18.5 CHRONIC KIDNEY DISEASE, STAGE 5 (HCC): Status: RESOLVED | Noted: 2023-01-17 | Resolved: 2024-11-20

## 2024-11-20 PROBLEM — N18.9 CHRONIC KIDNEY DISEASE (CKD): Status: RESOLVED | Noted: 2024-09-20 | Resolved: 2024-11-20

## 2024-11-20 PROBLEM — R10.9 ABDOMINAL PAIN: Status: RESOLVED | Noted: 2021-03-13 | Resolved: 2024-11-20

## 2024-11-20 PROBLEM — N18.5 CHRONIC KIDNEY DISEASE (CKD), STAGE V (HCC): Status: RESOLVED | Noted: 2023-01-17 | Resolved: 2024-11-20

## 2024-11-20 PROBLEM — N39.0 ACUTE UTI: Status: RESOLVED | Noted: 2021-03-13 | Resolved: 2024-11-20

## 2024-11-20 PROBLEM — N18.4 STAGE 4 CHRONIC KIDNEY DISEASE (HCC): Status: RESOLVED | Noted: 2022-04-05 | Resolved: 2024-11-20

## 2024-11-20 PROCEDURE — G8484 FLU IMMUNIZE NO ADMIN: HCPCS | Performed by: FAMILY MEDICINE

## 2024-11-20 PROCEDURE — 1123F ACP DISCUSS/DSCN MKR DOCD: CPT | Performed by: FAMILY MEDICINE

## 2024-11-20 PROCEDURE — 99214 OFFICE O/P EST MOD 30 MIN: CPT | Performed by: FAMILY MEDICINE

## 2024-11-20 PROCEDURE — G8399 PT W/DXA RESULTS DOCUMENT: HCPCS | Performed by: FAMILY MEDICINE

## 2024-11-20 PROCEDURE — 1159F MED LIST DOCD IN RCRD: CPT | Performed by: FAMILY MEDICINE

## 2024-11-20 PROCEDURE — G8427 DOCREV CUR MEDS BY ELIG CLIN: HCPCS | Performed by: FAMILY MEDICINE

## 2024-11-20 PROCEDURE — 1036F TOBACCO NON-USER: CPT | Performed by: FAMILY MEDICINE

## 2024-11-20 PROCEDURE — G8417 CALC BMI ABV UP PARAM F/U: HCPCS | Performed by: FAMILY MEDICINE

## 2024-11-20 PROCEDURE — 1090F PRES/ABSN URINE INCON ASSESS: CPT | Performed by: FAMILY MEDICINE

## 2024-11-20 RX ORDER — MONTELUKAST SODIUM 4 MG/1
1 TABLET, CHEWABLE ORAL 2 TIMES DAILY
Qty: 180 TABLET | Refills: 1 | Status: SHIPPED | OUTPATIENT
Start: 2024-11-20

## 2024-11-20 RX ORDER — TIRZEPATIDE 2.5 MG/.5ML
2.5 INJECTION, SOLUTION SUBCUTANEOUS WEEKLY
Qty: 2 ML | Refills: 5 | Status: SHIPPED | OUTPATIENT
Start: 2024-11-20

## 2024-11-20 RX ORDER — AMLODIPINE BESYLATE 10 MG/1
10 TABLET ORAL DAILY
Qty: 90 TABLET | Refills: 1 | Status: SHIPPED | OUTPATIENT
Start: 2024-11-20

## 2024-11-20 NOTE — PROGRESS NOTES
Arias Dueñas DO  Diplomate of the [employer] of [employer]  [facility]    History of Present Illness  The patient came in with multiple medical concerns and was accompanied by her daughter.    She has been undergoing dialysis since 2024, three times a week (, , and ). Although she dislikes the procedure, she continues with it because she wants to live. She has been seeing a kidney doctor for six years but only recently realized this. She is preparing for a kidney transplant and visited the transplant center on Monday. She was informed that her BMI is 40 and needs to be lower. She was advised to lose 22 pounds by 2025 and has consulted a dietitian. She mentioned having a poor appetite but tries to eat when she craves something, although she often loses interest in the food. She believes her weight gain is due to fluid retention. She is taking amlodipine for blood pressure control and reports no chest pain or breathing difficulties. She has undergone a stress test, EKG, and extensive blood work.    She is also dealing with irritable bowel syndrome with diarrhea. Despite taking colestipol daily, she still experiences frequent bowel movements.   She had hernia repair surgery and mentioned that her stomach is still healing, which the doctor said would take about six months.  ROS negative except as noted above today.      Social History     Tobacco Use    Smoking status: Former     Current packs/day: 0.00     Average packs/day: 0.3 packs/day for 27.8 years (7.0 ttl pk-yrs)     Types: Cigarettes     Start date: 6/3/1996     Quit date: 2024     Years since quittin.5    Smokeless tobacco: Never   Vaping Use    Vaping status: Never Used   Substance Use Topics    Alcohol use: Not Currently     Comment: NONE IN 1 YR    Drug use: No     Vitals:    24 1454   BP: 132/76   Site: Left Upper Arm   Position: Sitting   Pulse: 95   SpO2: 96%   Weight:

## 2024-11-22 ENCOUNTER — OFFICE VISIT (OUTPATIENT)
Dept: VASCULAR SURGERY | Age: 79
End: 2024-11-22

## 2024-11-22 VITALS
WEIGHT: 235 LBS | BODY MASS INDEX: 39.15 KG/M2 | DIASTOLIC BLOOD PRESSURE: 79 MMHG | HEIGHT: 65 IN | HEART RATE: 93 BPM | SYSTOLIC BLOOD PRESSURE: 179 MMHG | OXYGEN SATURATION: 96 %

## 2024-11-22 DIAGNOSIS — I73.9 PVD (PERIPHERAL VASCULAR DISEASE) WITH CLAUDICATION (HCC): Primary | ICD-10-CM

## 2024-11-22 NOTE — PROGRESS NOTES
[address] [address]  [phone] FAX: [phone]    Triny Ruvalcaba  : 1945    Chief Complaint:     History of Present Illness:   Patient follows up today for follow-up duplex study.  Patient status post revision of left brachiocephalic fistula    CURRENT MEDICATIONS:  Current Outpatient Medications   Medication Sig Dispense Refill    amLODIPine (NORVASC) 10 MG tablet Take 1 tablet by mouth daily 90 tablet 1    colestipol (COLESTID) 1 g tablet Take 1 tablet by mouth 2 times daily 180 tablet 1    diphenhydrAMINE (BENADRYL) 50 MG capsule Take 1 capsule by mouth every 6 hours as needed for Itching      acetaminophen (TYLENOL) 500 MG tablet Take 1 tablet by mouth every 6 hours as needed for Pain      Tirzepatide (MOUNJARO) 2.5 MG/0.5ML SOAJ Inject 2.5 mg into the skin once a week (Patient not taking: Reported on 2024) 2 mL 5     No current facility-administered medications for this visit.       Past Medical History:   Diagnosis Date    Arthritis     generalized    Chronic low back pain with bilateral sciatica 10/9/2016    Cigarette smoker     DDD (degenerative disc disease), lumbar 2017    ESRD (end stage renal disease) on dialysis ([facility])     onset 2024---T- TH- SAT--  RENAL IN [place]-- followed by dr cagle    Fibromyalgia     GERD (gastroesophageal reflux disease)     hx-- per pt not a current issue no meds    History of small bowel obstruction 2016    Hyperkalemia     Hypertension     controlled with med    Hypokalemia     Iron deficiency anemia     Irritable bowel syndrome with diarrhea 2016    Metabolic acidosis     NaHCO3 BID       Physical Examination:   Height: 1.651 m (5' 5\"), Weight - Scale: 106.6 kg (235 lb), BP: (!) 179/79    Constitutional: she appears well-developed. No distress.   HENT:   Head: Atraumatic.   Eyes: Pupils are equal, round, and reactive to light.   Neck: Normal range of motion.   Cardiovascular: Regular

## 2024-12-06 ENCOUNTER — TELEPHONE (OUTPATIENT)
Dept: INTERNAL MEDICINE CLINIC | Facility: CLINIC | Age: 79
End: 2024-12-06

## 2024-12-06 NOTE — TELEPHONE ENCOUNTER
Patient called and states that she is not able to afford her Mounjaro. Patient states that she does need to lose some weight but she is not able to pay $1000.00. Patient would like to know if there is anything else that can be called in for her. Please Advise.

## 2024-12-06 NOTE — TELEPHONE ENCOUNTER
Insurance will not cover Mounjaro. Looks like patient has IFG but needs to lose weight. Unsure if there are other options you had in mind?     Please advise.

## 2024-12-09 NOTE — TELEPHONE ENCOUNTER
Pt notified- states she already has a dietician. Notified patient to seek information and guidance from them

## 2024-12-19 DIAGNOSIS — N18.6 ESRD (END STAGE RENAL DISEASE) (HCC): Primary | ICD-10-CM

## 2024-12-26 ENCOUNTER — HOSPITAL ENCOUNTER (OUTPATIENT)
Dept: INTERVENTIONAL RADIOLOGY/VASCULAR | Age: 79
Discharge: HOME | End: 2024-12-29
Attending: INTERNAL MEDICINE
Payer: MEDICARE

## 2024-12-26 VITALS
HEART RATE: 92 BPM | TEMPERATURE: 97 F | OXYGEN SATURATION: 100 % | BODY MASS INDEX: 39.15 KG/M2 | HEIGHT: 65 IN | DIASTOLIC BLOOD PRESSURE: 75 MMHG | RESPIRATION RATE: 18 BRPM | SYSTOLIC BLOOD PRESSURE: 148 MMHG | WEIGHT: 235 LBS

## 2024-12-26 DIAGNOSIS — N18.6 ESRD (END STAGE RENAL DISEASE) (HCC): ICD-10-CM

## 2024-12-26 PROCEDURE — 36589 REMOVAL TUNNELED CV CATH: CPT

## 2024-12-26 PROCEDURE — 36589 REMOVAL TUNNELED CV CATH: CPT | Performed by: PHYSICIAN ASSISTANT

## 2024-12-26 ASSESSMENT — PAIN - FUNCTIONAL ASSESSMENT: PAIN_FUNCTIONAL_ASSESSMENT: NONE - DENIES PAIN

## 2024-12-26 NOTE — DISCHARGE INSTRUCTIONS
[facility]     Department of Interventional Radiology     [facility]    [phone] Office     [phone] Fax         DRAIN/PORT/CATHETER REMOVAL DISCHARGE INSTRUCTIONS           General Information:        Your doctor has ordered for us to remove your drain, port, or catheter. This could be that you do not need it anymore, it is not doing its job, your physician has decided on another plan for your treatment and/or it may need replacing.              Home Care Instructions:        You can resume your regular diet and medication regimen. Do not drink alcohol, drive, or make any important legal decisions in the next 24 hours. Do not lift anything heavier than a gallon of milk, or do anything strenuous for the next 24 hours. You will notice a dressing over the site of the removal. This dressing should stay in place until the site is healed. The dressing should be changed at least every 48 hours. You should change the dressing sooner if it becomes soiled or wet. The nurse who discharges you to home should review with you any wound care instructions. Resume your normal level of activity slowly. You may shower after 24 hours, but do not take a bath or swim or immerse yourself in water until the site has healed, and keep the dressing dry with plastic wrap while showering. The site may ooze for a couple of days. This drainage should lessen with each passing day.           Call If:        You should call your Physician and/or the Radiology Nurse if you have any bleeding other than a small spot on your bandage. Call if you have any signs of infection, fever, or increased pain at the site of the tube. Call if the oozing increases, if it changes color, or begins to have an odor.            Follow-Up Instructions: Please see your ordering doctor as he/she has requested.            To Reach Us:    If you have any questions about your procedure, please call the  notice any of these symptoms; do not wait until your next office visit.    Recognize signs and symptoms of STROKE:  F-face looks uneven    A-arms unable to move or move unevenly    S-speech slurred or non-existent    T-time-call 911 as soon as signs and symptoms begin-DO NOT go       Back to bed or wait to see if you get better-TIME IS BRAIN.

## 2024-12-26 NOTE — OP NOTE
TITLE: Tunneled hemodialysis catheter removal.    INDICATION: End-stage renal disease, functional AV access.    :  Charis Myles PA-C    Supervising Physician: Jordon Espana MD.   The physician attests to  supervising this procedure and agrees with the report as written.    CONSENT: Informed written and oral consent was obtained from the patient after  explanation of benefits and risks of procedure (including, but not limited to:  Hemorrhage, infection, air embolus). The patient's questions were were answered  to their satisfaction. The patient stated understanding and requested that we  proceed.    PROCEDURE:  With the patient in a semi reclined position, the skin of the right  neck and chest were prepped and draped in the standard fashion. Maximal sterile  barrier technique employed. Local anesthesia was administered throughout the  subcutaneous tunnel.    Using both sharp and blunt dissection, the catheter was removed in its entirety.  An occlusive dressing was placed on the skin.    COMPLICATIONS: None.    MEDICATIONS: Lidocaine    FINDINGS: Normal-appearing tunneled hemodialysis catheter of the right chest  removed in its entirety.   Impression:     Uncomplicated tunneled hemodialysis catheter removal.    PLAN: The patient has been discharged home in stable condition. Recommend  dressing removal in 24-48 hours.

## 2024-12-26 NOTE — FLOWSHEET NOTE
Recovery period without difficulty. Pt alert and oriented and denies pain. Dressing is clean, dry, and intact. Reviewed discharge instructions with patient and visitor, both verbalized understanding.

## 2025-01-22 ENCOUNTER — OFFICE VISIT (OUTPATIENT)
Dept: INTERNAL MEDICINE CLINIC | Facility: CLINIC | Age: 80
End: 2025-01-22

## 2025-01-22 VITALS
TEMPERATURE: 98.2 F | BODY MASS INDEX: 38.99 KG/M2 | OXYGEN SATURATION: 90 % | SYSTOLIC BLOOD PRESSURE: 122 MMHG | WEIGHT: 234 LBS | DIASTOLIC BLOOD PRESSURE: 70 MMHG | HEART RATE: 99 BPM | HEIGHT: 65 IN

## 2025-01-22 DIAGNOSIS — Z12.31 ENCOUNTER FOR SCREENING MAMMOGRAM FOR MALIGNANT NEOPLASM OF BREAST: ICD-10-CM

## 2025-01-22 DIAGNOSIS — Z12.4 CERVICAL CANCER SCREENING: ICD-10-CM

## 2025-01-22 DIAGNOSIS — N18.6 ESRD (END STAGE RENAL DISEASE) (HCC): ICD-10-CM

## 2025-01-22 DIAGNOSIS — Z01.419 WELL WOMAN EXAM: Primary | ICD-10-CM

## 2025-01-22 RX ORDER — FLUTICASONE PROPIONATE 50 MCG
1 SPRAY, SUSPENSION (ML) NASAL DAILY PRN
COMMUNITY

## 2025-01-22 SDOH — ECONOMIC STABILITY: FOOD INSECURITY: WITHIN THE PAST 12 MONTHS, YOU WORRIED THAT YOUR FOOD WOULD RUN OUT BEFORE YOU GOT MONEY TO BUY MORE.: NEVER TRUE

## 2025-01-22 SDOH — ECONOMIC STABILITY: FOOD INSECURITY: WITHIN THE PAST 12 MONTHS, THE FOOD YOU BOUGHT JUST DIDN'T LAST AND YOU DIDN'T HAVE MONEY TO GET MORE.: NEVER TRUE

## 2025-01-22 ASSESSMENT — PATIENT HEALTH QUESTIONNAIRE - PHQ9
2. FEELING DOWN, DEPRESSED OR HOPELESS: NOT AT ALL
1. LITTLE INTEREST OR PLEASURE IN DOING THINGS: NOT AT ALL
SUM OF ALL RESPONSES TO PHQ QUESTIONS 1-9: 0
SUM OF ALL RESPONSES TO PHQ9 QUESTIONS 1 & 2: 0
SUM OF ALL RESPONSES TO PHQ QUESTIONS 1-9: 0

## 2025-01-22 NOTE — PROGRESS NOTES
Triny Ruvalcaba (:  1945) is a 79 y.o. female,Established patient, here for evaluation of the following chief complaint(s):  Well Woman Visit         Assessment & Plan  Well woman exam            Cervical cancer screening   Vaginal pap  Orders:    PAP IG, HPV Rfx HPV 16/18,45; Future    Encounter for screening mammogram for malignant neoplasm of breast   Get mammogram    Orders:    R[facility] DIGITAL SCREEN W OR WO CAD BILATERAL; Future    ESRD (end stage renal disease) (HCC)   Waiting on transplant, needs cancer screening  Get pap though hx of hysterectomy years ago           No follow-ups on file.       Subjective   Patient is here for a well woman exam. She is waiting for a kidney transplant. She had hysterectomy 50 years ago. She has her ovaries. No abnormal discharge or bleeding. She isn't sexually active.         Review of Systems       Objective   Physical Exam  Exam conducted with a chaperone present.   Constitutional:       Appearance: Normal appearance. She is obese. She is not ill-appearing.   HENT:      Head: Normocephalic.   Eyes:      Extraocular Movements: Extraocular movements intact.      Pupils: Pupils are equal, round, and reactive to light.   Cardiovascular:      Rate and Rhythm: Normal rate and regular rhythm.      Heart sounds: Murmur heard.   Pulmonary:      Effort: Pulmonary effort is normal.      Breath sounds: No wheezing.   Genitourinary:     General: Normal vulva.      Vagina: Normal.      Adnexa: Right adnexa normal and left adnexa normal.   Musculoskeletal:      Cervical back: Neck supple.   Neurological:      General: No focal deficit present.      Mental Status: She is alert.                  An electronic signature was used to authenticate this note.    --Rachana Kelly, APRN - CNP

## 2025-01-30 LAB
COLLECTION METHOD: ABNORMAL
CYTOLOGIST CVX/VAG CYTO: ABNORMAL
CYTOLOGY CVX/VAG DOC THIN PREP: ABNORMAL
DATE OF LMP: 0
HPV APTIMA: NEGATIVE
Lab: ABNORMAL
PAP SOURCE: ABNORMAL
PATH REPORT.FINAL DX SPEC: ABNORMAL
PATHOLOGIST CVX/VAG CYTO: ABNORMAL
PATHOLOGIST PROVIDED ICD: ABNORMAL
RECOM F/U CVX/VAG CYTO: ABNORMAL
STAT OF ADQ CVX/VAG CYTO-IMP: ABNORMAL

## 2025-02-21 ENCOUNTER — OFFICE VISIT (OUTPATIENT)
Dept: INTERNAL MEDICINE CLINIC | Facility: CLINIC | Age: 80
End: 2025-02-21
Payer: MEDICARE

## 2025-02-21 VITALS
WEIGHT: 234 LBS | HEART RATE: 82 BPM | OXYGEN SATURATION: 98 % | HEIGHT: 65 IN | SYSTOLIC BLOOD PRESSURE: 136 MMHG | BODY MASS INDEX: 38.99 KG/M2 | DIASTOLIC BLOOD PRESSURE: 70 MMHG

## 2025-02-21 DIAGNOSIS — J30.1 SEASONAL ALLERGIC RHINITIS DUE TO POLLEN: ICD-10-CM

## 2025-02-21 DIAGNOSIS — N18.6 ESRD (END STAGE RENAL DISEASE) ON DIALYSIS (HCC): ICD-10-CM

## 2025-02-21 DIAGNOSIS — I12.9 BENIGN HYPERTENSION WITH CHRONIC KIDNEY DISEASE: ICD-10-CM

## 2025-02-21 DIAGNOSIS — R73.01 IFG (IMPAIRED FASTING GLUCOSE): ICD-10-CM

## 2025-02-21 DIAGNOSIS — Z99.2 ESRD (END STAGE RENAL DISEASE) ON DIALYSIS (HCC): ICD-10-CM

## 2025-02-21 DIAGNOSIS — J32.9 RECURRENT SINUSITIS: Primary | ICD-10-CM

## 2025-02-21 PROCEDURE — 1123F ACP DISCUSS/DSCN MKR DOCD: CPT | Performed by: FAMILY MEDICINE

## 2025-02-21 PROCEDURE — G8399 PT W/DXA RESULTS DOCUMENT: HCPCS | Performed by: FAMILY MEDICINE

## 2025-02-21 PROCEDURE — 1090F PRES/ABSN URINE INCON ASSESS: CPT | Performed by: FAMILY MEDICINE

## 2025-02-21 PROCEDURE — G8417 CALC BMI ABV UP PARAM F/U: HCPCS | Performed by: FAMILY MEDICINE

## 2025-02-21 PROCEDURE — G8427 DOCREV CUR MEDS BY ELIG CLIN: HCPCS | Performed by: FAMILY MEDICINE

## 2025-02-21 PROCEDURE — 1159F MED LIST DOCD IN RCRD: CPT | Performed by: FAMILY MEDICINE

## 2025-02-21 PROCEDURE — 99214 OFFICE O/P EST MOD 30 MIN: CPT | Performed by: FAMILY MEDICINE

## 2025-02-21 PROCEDURE — 1036F TOBACCO NON-USER: CPT | Performed by: FAMILY MEDICINE

## 2025-02-21 PROCEDURE — 1160F RVW MEDS BY RX/DR IN RCRD: CPT | Performed by: FAMILY MEDICINE

## 2025-02-21 RX ORDER — AMLODIPINE BESYLATE 10 MG/1
10 TABLET ORAL DAILY
Qty: 90 TABLET | Refills: 1 | Status: SHIPPED | OUTPATIENT
Start: 2025-02-21

## 2025-02-21 RX ORDER — SEMAGLUTIDE 1.34 MG/ML
INJECTION, SOLUTION SUBCUTANEOUS
COMMUNITY

## 2025-02-21 RX ORDER — LEVOCETIRIZINE DIHYDROCHLORIDE 5 MG/1
5 TABLET, FILM COATED ORAL DAILY
Qty: 90 TABLET | Refills: 1 | Status: SHIPPED | OUTPATIENT
Start: 2025-02-21

## 2025-02-21 RX ORDER — FLUTICASONE PROPIONATE 50 MCG
1 SPRAY, SUSPENSION (ML) NASAL DAILY PRN
Qty: 16 G | Refills: 5 | Status: SHIPPED | OUTPATIENT
Start: 2025-02-21

## 2025-02-21 NOTE — PROGRESS NOTES
Arias Dueñas DO  Diplomate of the [employer]  [facility]    Triny Ruvalcaba (: 1945) is a 79 y.o. female, here for evaluation of the following chief complaint(s):  Hypertension     1. Recurrent sinusitis  -     amoxicillin-clavulanate (AUGMENTIN) 875-125 MG per tablet; Take 1 tablet by mouth 2 times daily for 10 days, Disp-20 tablet, R-0Normal  2. Benign hypertension with chronic kidney disease  -     amLODIPine (NORVASC) 10 MG tablet; Take 1 tablet by mouth daily, Disp-90 tablet, R-1Normal  3. ESRD (end stage renal disease) on dialysis (HCC)  4. IFG (impaired fasting glucose)  5. Seasonal allergic rhinitis due to pollen  -     fluticasone (FLONASE) 50 MCG/ACT nasal spray; 1 spray by Each Nostril route daily as needed for Rhinitis, Disp-16 g, R-5Normal  -     levocetirizine (XYZAL) 5 MG tablet; Take 1 tablet by mouth daily, Disp-90 tablet, R-1Normal    -Symptomatic therapy suggested: gargle salt water for sore throat, use nasal saline mist at bedside for congestion.  Apply facial warm packs for sinus pain.  May use acetaminophen, ibuprofen, antihistamine of choice. Increase fluids and rest.  -Tolerating medication well for HTN. Achieving desired therapeutic response with   Hypertension Medications       Calcium Channel Blockers       amLODIPine (NORVASC) 10 MG tablet Take 1 tablet by mouth daily         --will continue. Will periodically review and adjust if needed.  Encourage home monitoring.   -Continue dialysis and following up with Nephrology.  -Will continue with ozempic and titrate as able.  -Restart flonase and xyzal  History of Present Illness  Persistent sinus congestion since December with greenish nasal discharge. A 7-day course of amoxicillin-clavulanate in January provided some relief, but symptoms persist and has gotten worse. Previously used Flonase and levocetirizine with benefit, requesting refills.    Lost 7 pounds with Ozempic 1 mg, which

## 2025-08-12 DIAGNOSIS — I12.9 BENIGN HYPERTENSION WITH CHRONIC KIDNEY DISEASE: ICD-10-CM

## 2025-08-13 RX ORDER — AMLODIPINE BESYLATE 10 MG/1
10 TABLET ORAL DAILY
Qty: 90 TABLET | Refills: 1 | Status: SHIPPED | OUTPATIENT
Start: 2025-08-13

## 2025-08-22 ENCOUNTER — OFFICE VISIT (OUTPATIENT)
Dept: INTERNAL MEDICINE CLINIC | Facility: CLINIC | Age: 80
End: 2025-08-22
Payer: MEDICARE

## 2025-08-22 VITALS
HEART RATE: 85 BPM | WEIGHT: 226.6 LBS | HEIGHT: 65 IN | DIASTOLIC BLOOD PRESSURE: 84 MMHG | SYSTOLIC BLOOD PRESSURE: 124 MMHG | RESPIRATION RATE: 16 BRPM | OXYGEN SATURATION: 95 % | BODY MASS INDEX: 37.75 KG/M2

## 2025-08-22 DIAGNOSIS — R73.01 IFG (IMPAIRED FASTING GLUCOSE): ICD-10-CM

## 2025-08-22 DIAGNOSIS — N18.6 ESRD (END STAGE RENAL DISEASE) ON DIALYSIS (HCC): Primary | ICD-10-CM

## 2025-08-22 DIAGNOSIS — I12.9 BENIGN HYPERTENSION WITH CHRONIC KIDNEY DISEASE: ICD-10-CM

## 2025-08-22 DIAGNOSIS — Z99.2 ESRD (END STAGE RENAL DISEASE) ON DIALYSIS (HCC): Primary | ICD-10-CM

## 2025-08-22 PROCEDURE — 1036F TOBACCO NON-USER: CPT | Performed by: FAMILY MEDICINE

## 2025-08-22 PROCEDURE — G8417 CALC BMI ABV UP PARAM F/U: HCPCS | Performed by: FAMILY MEDICINE

## 2025-08-22 PROCEDURE — 99214 OFFICE O/P EST MOD 30 MIN: CPT | Performed by: FAMILY MEDICINE

## 2025-08-22 PROCEDURE — 1090F PRES/ABSN URINE INCON ASSESS: CPT | Performed by: FAMILY MEDICINE

## 2025-08-22 PROCEDURE — 1159F MED LIST DOCD IN RCRD: CPT | Performed by: FAMILY MEDICINE

## 2025-08-22 PROCEDURE — 1123F ACP DISCUSS/DSCN MKR DOCD: CPT | Performed by: FAMILY MEDICINE

## 2025-08-22 PROCEDURE — 1126F AMNT PAIN NOTED NONE PRSNT: CPT | Performed by: FAMILY MEDICINE

## 2025-08-22 PROCEDURE — G8427 DOCREV CUR MEDS BY ELIG CLIN: HCPCS | Performed by: FAMILY MEDICINE

## 2025-08-22 PROCEDURE — G2211 COMPLEX E/M VISIT ADD ON: HCPCS | Performed by: FAMILY MEDICINE

## 2025-08-22 PROCEDURE — G8399 PT W/DXA RESULTS DOCUMENT: HCPCS | Performed by: FAMILY MEDICINE

## 2025-08-22 RX ORDER — VADADUSTAT 150 MG/1
150 TABLET, FILM COATED ORAL DAILY
COMMUNITY

## 2025-08-22 RX ORDER — POLYETHYLENE GLYCOL 3350, SODIUM SULFATE ANHYDROUS, SODIUM BICARBONATE, SODIUM CHLORIDE, POTASSIUM CHLORIDE 236; 22.74; 6.74; 5.86; 2.97 G/4L; G/4L; G/4L; G/4L; G/4L
POWDER, FOR SOLUTION ORAL
COMMUNITY
Start: 2025-03-03

## 2025-08-22 RX ORDER — SEMAGLUTIDE 0.68 MG/ML
INJECTION, SOLUTION SUBCUTANEOUS
Qty: 3 ML | Refills: 2 | Status: SHIPPED | OUTPATIENT
Start: 2025-08-22

## 2025-08-22 RX ORDER — ASCORBIC ACID 100 MG
1 TABLET,CHEWABLE ORAL DAILY
COMMUNITY

## 2025-08-22 SDOH — ECONOMIC STABILITY: FOOD INSECURITY: WITHIN THE PAST 12 MONTHS, YOU WORRIED THAT YOUR FOOD WOULD RUN OUT BEFORE YOU GOT MONEY TO BUY MORE.: NEVER TRUE

## 2025-08-22 SDOH — ECONOMIC STABILITY: FOOD INSECURITY: WITHIN THE PAST 12 MONTHS, THE FOOD YOU BOUGHT JUST DIDN'T LAST AND YOU DIDN'T HAVE MONEY TO GET MORE.: NEVER TRUE

## 2025-08-22 ASSESSMENT — PATIENT HEALTH QUESTIONNAIRE - PHQ9
SUM OF ALL RESPONSES TO PHQ QUESTIONS 1-9: 1
1. LITTLE INTEREST OR PLEASURE IN DOING THINGS: NOT AT ALL
SUM OF ALL RESPONSES TO PHQ QUESTIONS 1-9: 1
2. FEELING DOWN, DEPRESSED OR HOPELESS: SEVERAL DAYS
SUM OF ALL RESPONSES TO PHQ QUESTIONS 1-9: 1
SUM OF ALL RESPONSES TO PHQ QUESTIONS 1-9: 1

## 2025-08-27 ENCOUNTER — TELEPHONE (OUTPATIENT)
Dept: INTERNAL MEDICINE CLINIC | Facility: CLINIC | Age: 80
End: 2025-08-27

## (undated) DEVICE — SUTURE MONOCRYL SZ 4-0 L27IN ABSRB UD L19MM PS-2 1/2 CIR PRIM Y426H

## (undated) DEVICE — SUTURE PERMAHAND SZ 2-0 L18IN NONABSORBABLE BLK L26MM SH C012D

## (undated) DEVICE — BINDER ABD M/L H12IN FOR 46-62IN WHT 4 SLD PNL DSGN HOOP

## (undated) DEVICE — SUTURE PDS II SZ 1 L96IN ABSRB VLT TP-1 L65MM 1/2 CIR Z880G

## (undated) DEVICE — SUTURE PROL SZ 0 L30IN NONABSORBABLE BLU L26MM CT-2 1/2 CIR 8412H

## (undated) DEVICE — ADHESIVE DERMABOND TOPICAL SKIN MINI .36ML

## (undated) DEVICE — GLOVE SURG SZ 75 CRM LTX FREE POLYISOPRENE POLYMER BEAD ANTI

## (undated) DEVICE — INTENDED FOR TISSUE SEPARATION, AND OTHER PROCEDURES THAT REQUIRE A SHARP SURGICAL BLADE TO PUNCTURE OR CUT.: Brand: BARD-PARKER ® CARBON RIB-BACK BLADES

## (undated) DEVICE — SUTURE PERMAHAND SZ 0 L30IN NONABSORBABLE BLK L30MM PSL 3/8 590H

## (undated) DEVICE — AV FISTULA: Brand: MEDLINE INDUSTRIES, INC.

## (undated) DEVICE — MAJOR GENERAL: Brand: MEDLINE INDUSTRIES, INC.

## (undated) DEVICE — DRAIN SURG 3/4 W10MMXL20CM 100% SIL PERF FLAT HUBLESS

## (undated) DEVICE — GAUZE,SPONGE,4"X4",16PLY,STRL,LF,10/TRAY: Brand: MEDLINE

## (undated) DEVICE — RADIFOCUS GLIDEWIRE: Brand: GLIDEWIRE

## (undated) DEVICE — SUTURE VICRYL SZ 2-0 L36IN ABSRB UD L36MM CT-1 1/2 CIR J945H

## (undated) DEVICE — VASCUCLAMP RADIOPAQUE VASCULAR CLAMP MEDIUM ANGLED (2 CLAMPS PER PACKAGE): Brand: VASCUCLAMP

## (undated) DEVICE — APPLIER CLP L9.375IN APER 2.1MM CLS L3.8MM 20 SM TI CLP

## (undated) DEVICE — SOLUTION IRRIG 1000ML 0.9% SOD CHL USP POUR PLAS BTL

## (undated) DEVICE — APPLIER LIG CLP M L11IN TI STR RNG HNDL FOR 20 CLP DISP

## (undated) DEVICE — NEEDLE HYPO 21GA L1.25IN GRN POLYPR HUB S STL REG BVL STR

## (undated) DEVICE — SUTURE PERMAHAND SZ 2-0 L12X18IN NONABSORBABLE BLK SILK A185H

## (undated) DEVICE — SUTURE PROL SZ 6-0 L24IN NONABSORBABLE BLU BV-1 L9.3MM 3/8 M8805

## (undated) DEVICE — BNDG,ELSTC,MATRIX,STRL,4"X5YD,LF,HOOK&LP: Brand: MEDLINE

## (undated) DEVICE — SUTURE ABSORBABLE 10 36 IN BRAIDED COATED VICRYL UNDYED

## (undated) DEVICE — BANDAGE,GAUZE,BULKEE II,4.5"X4.1YD,STRL: Brand: MEDLINE

## (undated) DEVICE — GLOVE SURG SZ 7.5 L11.73IN FNGR THK9.8MIL STRW LTX POLYMER

## (undated) DEVICE — STAPLER ENDOSCP 5MM ABS FIX DEV W/ 30 TACKS DISP

## (undated) DEVICE — RESERVOIR,SUCTION,100CC,SILICONE: Brand: MEDLINE

## (undated) DEVICE — SUTURE MONOCRYL N ABSRB MONOFILAMENT 3-0 PS-2 18 IN NDL MONOCRYL PLUS

## (undated) DEVICE — SUTURE VICRYL + SZ 3-0 L27IN ABSRB UD L26MM SH 1/2 CIR VCP416H

## (undated) DEVICE — NEEDLE HYPO 21GA L1.5IN INTRAMUSCULAR S STL LATCH BVL UP

## (undated) DEVICE — ABSORBENT, WATERPROOF, BACTERIA PROOF FILM DRESSING: Brand: OPSITE POST OP 20X10CM CTN 20

## (undated) DEVICE — Device

## (undated) DEVICE — SUTURE MONOCRYL SZ 3-0 L27IN ABSRB UD L19MM PS-2 3/8 CIR PRIM Y427H

## (undated) DEVICE — SUTURE VICRYL SZ 0 L27IN ABSRB UD L36MM CT-1 1/2 CIR J260H

## (undated) DEVICE — SUTURE PERMAHAND SZ 3-0 L18IN NONABSORBABLE BLK L26MM SH C013D